# Patient Record
Sex: MALE | Race: WHITE | NOT HISPANIC OR LATINO | Employment: OTHER | URBAN - METROPOLITAN AREA
[De-identification: names, ages, dates, MRNs, and addresses within clinical notes are randomized per-mention and may not be internally consistent; named-entity substitution may affect disease eponyms.]

---

## 2020-09-02 ENCOUNTER — TRANSCRIBE ORDERS (OUTPATIENT)
Dept: ADMINISTRATIVE | Facility: HOSPITAL | Age: 65
End: 2020-09-02

## 2020-09-02 DIAGNOSIS — R94.39 ABNORMAL STRESS TEST: Primary | ICD-10-CM

## 2020-09-03 ENCOUNTER — TRANSCRIBE ORDERS (OUTPATIENT)
Dept: LAB | Facility: HOSPITAL | Age: 65
End: 2020-09-03

## 2020-09-03 DIAGNOSIS — Z01.818 PRE-OP TESTING: Primary | ICD-10-CM

## 2020-09-06 ENCOUNTER — LAB (OUTPATIENT)
Dept: LAB | Facility: HOSPITAL | Age: 65
End: 2020-09-06

## 2020-09-06 ENCOUNTER — TRANSCRIBE ORDERS (OUTPATIENT)
Dept: ADMINISTRATIVE | Facility: HOSPITAL | Age: 65
End: 2020-09-06

## 2020-09-06 DIAGNOSIS — Z01.818 PRE-OP TESTING: Primary | ICD-10-CM

## 2020-09-06 DIAGNOSIS — Z01.818 PRE-OP TESTING: ICD-10-CM

## 2020-09-06 PROCEDURE — C9803 HOPD COVID-19 SPEC COLLECT: HCPCS

## 2020-09-06 PROCEDURE — U0004 COV-19 TEST NON-CDC HGH THRU: HCPCS

## 2020-09-07 LAB — SARS-COV-2 RNA NOSE QL NAA+PROBE: NOT DETECTED

## 2020-09-09 ENCOUNTER — HOSPITAL ENCOUNTER (OUTPATIENT)
Facility: HOSPITAL | Age: 65
Setting detail: HOSPITAL OUTPATIENT SURGERY
Discharge: HOME | End: 2020-09-09
Attending: INTERNAL MEDICINE | Admitting: INTERNAL MEDICINE

## 2020-09-09 VITALS
HEIGHT: 70 IN | TEMPERATURE: 97 F | HEART RATE: 69 BPM | OXYGEN SATURATION: 95 % | DIASTOLIC BLOOD PRESSURE: 84 MMHG | SYSTOLIC BLOOD PRESSURE: 142 MMHG | RESPIRATION RATE: 18 BRPM | WEIGHT: 225.5 LBS | BODY MASS INDEX: 32.28 KG/M2

## 2020-09-09 DIAGNOSIS — R94.39 ABNORMAL STRESS TEST: Primary | ICD-10-CM

## 2020-09-09 LAB
ANION GAP SERPL CALCULATED.3IONS-SCNC: 8 MMOL/L (ref 5–15)
BUN SERPL-MCNC: 21 MG/DL (ref 8–23)
BUN/CREAT SERPL: 16.2 (ref 7–25)
CALCIUM SPEC-SCNC: 9.1 MG/DL (ref 8.6–10.5)
CHLORIDE SERPL-SCNC: 107 MMOL/L (ref 98–107)
CHOLEST SERPL-MCNC: 187 MG/DL (ref 0–200)
CO2 SERPL-SCNC: 23 MMOL/L (ref 22–29)
CREAT SERPL-MCNC: 1.3 MG/DL (ref 0.76–1.27)
DEPRECATED RDW RBC AUTO: 45.2 FL (ref 37–54)
ERYTHROCYTE [DISTWIDTH] IN BLOOD BY AUTOMATED COUNT: 12.5 % (ref 12.3–15.4)
GFR SERPL CREATININE-BSD FRML MDRD: 55 ML/MIN/1.73
GLUCOSE SERPL-MCNC: 126 MG/DL (ref 65–99)
HCT VFR BLD AUTO: 40.5 % (ref 37.5–51)
HDLC SERPL-MCNC: 28 MG/DL (ref 40–60)
HGB BLD-MCNC: 13.4 G/DL (ref 13–17.7)
LDLC SERPL CALC-MCNC: 109 MG/DL (ref 0–100)
LDLC/HDLC SERPL: 3.88 {RATIO}
MCH RBC QN AUTO: 32.5 PG (ref 26.6–33)
MCHC RBC AUTO-ENTMCNC: 33.1 G/DL (ref 31.5–35.7)
MCV RBC AUTO: 98.3 FL (ref 79–97)
PLATELET # BLD AUTO: 224 10*3/MM3 (ref 140–450)
PMV BLD AUTO: 8.8 FL (ref 6–12)
POTASSIUM SERPL-SCNC: 4.2 MMOL/L (ref 3.5–5.2)
RBC # BLD AUTO: 4.12 10*6/MM3 (ref 4.14–5.8)
SODIUM SERPL-SCNC: 138 MMOL/L (ref 136–145)
TRIGL SERPL-MCNC: 252 MG/DL (ref 0–150)
VLDLC SERPL-MCNC: 50.4 MG/DL
WBC # BLD AUTO: 7.1 10*3/MM3 (ref 3.4–10.8)

## 2020-09-09 PROCEDURE — 25010000003 LIDOCAINE 1 % SOLUTION: Performed by: INTERNAL MEDICINE

## 2020-09-09 PROCEDURE — C1769 GUIDE WIRE: HCPCS | Performed by: INTERNAL MEDICINE

## 2020-09-09 PROCEDURE — 25010000002 FENTANYL CITRATE (PF) 100 MCG/2ML SOLUTION: Performed by: INTERNAL MEDICINE

## 2020-09-09 PROCEDURE — 0 IOPAMIDOL PER 1 ML: Performed by: INTERNAL MEDICINE

## 2020-09-09 PROCEDURE — 80061 LIPID PANEL: CPT | Performed by: INTERNAL MEDICINE

## 2020-09-09 PROCEDURE — 82565 ASSAY OF CREATININE: CPT

## 2020-09-09 PROCEDURE — C1894 INTRO/SHEATH, NON-LASER: HCPCS | Performed by: INTERNAL MEDICINE

## 2020-09-09 PROCEDURE — 25010000002 MIDAZOLAM PER 1 MG: Performed by: INTERNAL MEDICINE

## 2020-09-09 PROCEDURE — 85027 COMPLETE CBC AUTOMATED: CPT

## 2020-09-09 PROCEDURE — 25010000002 HEPARIN (PORCINE) PER 1000 UNITS: Performed by: INTERNAL MEDICINE

## 2020-09-09 PROCEDURE — 93458 L HRT ARTERY/VENTRICLE ANGIO: CPT | Performed by: INTERNAL MEDICINE

## 2020-09-09 PROCEDURE — 80048 BASIC METABOLIC PNL TOTAL CA: CPT

## 2020-09-09 RX ORDER — ASPIRIN 81 MG/1
81 TABLET ORAL DAILY
COMMUNITY
End: 2021-07-01 | Stop reason: HOSPADM

## 2020-09-09 RX ORDER — ALPRAZOLAM 0.25 MG/1
0.25 TABLET ORAL 3 TIMES DAILY PRN
Status: DISCONTINUED | OUTPATIENT
Start: 2020-09-09 | End: 2020-09-09 | Stop reason: HOSPADM

## 2020-09-09 RX ORDER — NALOXONE HCL 0.4 MG/ML
0.4 VIAL (ML) INJECTION
Status: DISCONTINUED | OUTPATIENT
Start: 2020-09-09 | End: 2020-09-09 | Stop reason: HOSPADM

## 2020-09-09 RX ORDER — ASPIRIN 325 MG
325 TABLET, DELAYED RELEASE (ENTERIC COATED) ORAL DAILY
Status: DISCONTINUED | OUTPATIENT
Start: 2020-09-09 | End: 2020-09-09 | Stop reason: HOSPADM

## 2020-09-09 RX ORDER — MIDAZOLAM HYDROCHLORIDE 1 MG/ML
INJECTION INTRAMUSCULAR; INTRAVENOUS AS NEEDED
Status: DISCONTINUED | OUTPATIENT
Start: 2020-09-09 | End: 2020-09-09 | Stop reason: HOSPADM

## 2020-09-09 RX ORDER — TEMAZEPAM 7.5 MG/1
7.5 CAPSULE ORAL NIGHTLY PRN
Status: DISCONTINUED | OUTPATIENT
Start: 2020-09-09 | End: 2020-09-09 | Stop reason: HOSPADM

## 2020-09-09 RX ORDER — SODIUM CHLORIDE 9 MG/ML
250 INJECTION, SOLUTION INTRAVENOUS CONTINUOUS
Status: ACTIVE | OUTPATIENT
Start: 2020-09-09 | End: 2020-09-09

## 2020-09-09 RX ORDER — LIDOCAINE HYDROCHLORIDE 10 MG/ML
INJECTION, SOLUTION INFILTRATION; PERINEURAL AS NEEDED
Status: DISCONTINUED | OUTPATIENT
Start: 2020-09-09 | End: 2020-09-09 | Stop reason: HOSPADM

## 2020-09-09 RX ORDER — MORPHINE SULFATE 2 MG/ML
1 INJECTION, SOLUTION INTRAMUSCULAR; INTRAVENOUS EVERY 4 HOURS PRN
Status: DISCONTINUED | OUTPATIENT
Start: 2020-09-09 | End: 2020-09-09 | Stop reason: HOSPADM

## 2020-09-09 RX ORDER — ROSUVASTATIN CALCIUM 20 MG/1
20 TABLET, COATED ORAL DAILY
Qty: 30 TABLET | Refills: 11 | Status: SHIPPED | OUTPATIENT
Start: 2020-09-09 | End: 2020-09-10 | Stop reason: SDUPTHER

## 2020-09-09 RX ORDER — DEXTROSE MONOHYDRATE 25 G/50ML
25 INJECTION, SOLUTION INTRAVENOUS
Status: DISCONTINUED | OUTPATIENT
Start: 2020-09-09 | End: 2020-09-09 | Stop reason: HOSPADM

## 2020-09-09 RX ORDER — HYDROCODONE BITARTRATE AND ACETAMINOPHEN 5; 325 MG/1; MG/1
1 TABLET ORAL EVERY 4 HOURS PRN
Status: DISCONTINUED | OUTPATIENT
Start: 2020-09-09 | End: 2020-09-09 | Stop reason: HOSPADM

## 2020-09-09 RX ORDER — ACETAMINOPHEN 325 MG/1
650 TABLET ORAL EVERY 4 HOURS PRN
Status: DISCONTINUED | OUTPATIENT
Start: 2020-09-09 | End: 2020-09-09 | Stop reason: HOSPADM

## 2020-09-09 RX ORDER — FENTANYL CITRATE 50 UG/ML
INJECTION, SOLUTION INTRAMUSCULAR; INTRAVENOUS AS NEEDED
Status: DISCONTINUED | OUTPATIENT
Start: 2020-09-09 | End: 2020-09-09 | Stop reason: HOSPADM

## 2020-09-09 RX ORDER — NICOTINE POLACRILEX 4 MG
15 LOZENGE BUCCAL
Status: DISCONTINUED | OUTPATIENT
Start: 2020-09-09 | End: 2020-09-09 | Stop reason: HOSPADM

## 2020-09-09 RX ADMIN — ASPIRIN 325 MG: 325 TABLET, COATED ORAL at 07:19

## 2020-09-09 NOTE — H&P
Pre-Cardiac Catheterization Report  Cardiovascular Laboratory  Psychiatric      Patient:  Andry Potter  :  1955  PCP:  Jesse Moctezuma MD  PHONE:  [phone]    DATE: 2020    BRIEF HPI:  Andry Potter is a 65 y.o. male with hypertension, diabetes mellitus, and former tobacco abuse.  He is complaining of a 6-month history of increasing shortness of breath.  He states it is moderate in severity lasting hours with associated severe fatigue and having no energy.  He recently underwent an abnormal stress test and now presents for left heart catheterization with possible intervention.    Cardiac Risk Factors:  advanced age (older than 55 for men, 65 for women), diabetes mellitus, hypertension, male gender, obesity (BMI >= 30 kg/m2), smoking/ tobacco exposure    Anginal class in last 2 weeks:  CCS class I    CHF Class in last 2 weeks:  NYHA Class II    Cardiogenic shock:  no    Cardiac arrest <24 hours:  no    Stress test within last 6 months:   yes   Details:    Previous cardiac catheterization:  no  Details:     Previous CABG:  no  Details:      Allergies:     IV contrast allergy:  no  No Known Allergies    MEDICATIONS:  Prior to Admission medications    Medication Sig Start Date End Date Taking? Authorizing Provider   aspirin 81 MG EC tablet Take 81 mg by mouth Daily.   Yes Provider, MD Lucy   lisinopril (PRINIVIL,ZESTRIL) 10 MG tablet Take 10 mg by mouth Daily. 2/10/19  Yes Emergency, Nurse LAVERN García   metFORMIN (GLUCOPHAGE) 1000 MG tablet Take 1,000 mg by mouth 2 (Two) Times a Day With Meals. 2/10/19  Yes Emergency, Nurse Raquel RN   tamsulosin (FLOMAX) 0.4 MG capsule 24 hr capsule 1 capsule Daily. 19  Yes Emergency, Nurse Epic, RN       Past medical & surgical history, social and family history reviewed in the electronic medical record.    ROS:  Cardiovascular ROS: positive for - dyspnea on exertion, shortness of breath and fatigue    Physical Exam:    Vitals:   Vitals:     09/09/20 0644   BP: 159/96   Pulse: 69   Resp: 18   Temp: 97 °F (36.1 °C)   SpO2: 96%          09/09/20 0644   Weight: 102 kg (225 lb 8 oz)       General Appearance:    Alert, cooperative, in no acute distress   Head:    Normocephalic, without obvious abnormality, atraumatic   Eyes:            Lids and lashes normal, conjunctivae and sclerae normal, no   icterus, no pallor, corneas clear, PERRLA   Ears:    Ears appear intact with no abnormalities noted   Neck:   No adenopathy, supple, trachea midline, no thyromegaly, no   carotid bruit, no JVD   Back:     No kyphosis present, no scoliosis present, range of motion normal   Lungs:     Clear to auscultation,respirations regular, even and                unlabored    Heart:    Regular rhythm and normal rate, normal S1 and S2, no         murmur, no gallop, no rub, no click   Chest Wall:    No abnormalities observed   Abdomen:     Normal bowel sounds, no masses, no organomegaly, soft     nontender, nondistended, no guarding, no rebound                tenderness   Rectal:     Deferred   Extremities:   Moves all extremities well, no edema, no cyanosis, no           redness   Pulses:   Pulses palpable and equal bilaterally   Skin:   No bleeding, bruising or rash   Neurologic:   Cranial nerves 2 - 12 grossly intact, sensation intact     Barbaeu Test:  Left: Normal  (oxymetric Allens) Right: Not Assessed         Results from last 7 days   Lab Units 09/09/20  0637   WBC 10*3/mm3 7.10   HEMOGLOBIN g/dL 13.4   HEMATOCRIT % 40.5   PLATELETS 10*3/mm3 224     No results found for: CHLPL, TRIG, HDL, LDLDIRECT, AST, ALT        Impression      · Abnormal stress test    Plan     · Procedure to perform: [facility]  · Planned access: Per LAISHA Martinez  09/09/20  07:17

## 2020-09-10 ENCOUNTER — CONSULT (OUTPATIENT)
Dept: SLEEP MEDICINE | Facility: HOSPITAL | Age: 65
End: 2020-09-10

## 2020-09-10 VITALS
TEMPERATURE: 98.6 F | DIASTOLIC BLOOD PRESSURE: 62 MMHG | HEIGHT: 70 IN | WEIGHT: 226 LBS | HEART RATE: 74 BPM | OXYGEN SATURATION: 95 % | BODY MASS INDEX: 32.35 KG/M2 | SYSTOLIC BLOOD PRESSURE: 113 MMHG | RESPIRATION RATE: 16 BRPM

## 2020-09-10 DIAGNOSIS — E66.9 OBESITY (BMI 30-39.9): Primary | ICD-10-CM

## 2020-09-10 DIAGNOSIS — R06.83 SNORING: ICD-10-CM

## 2020-09-10 DIAGNOSIS — G47.33 OSA (OBSTRUCTIVE SLEEP APNEA): ICD-10-CM

## 2020-09-10 PROBLEM — E78.5 DYSLIPIDEMIA: Status: ACTIVE | Noted: 2020-09-10

## 2020-09-10 PROBLEM — E11.9 TYPE 2 DIABETES MELLITUS: Status: ACTIVE | Noted: 2020-09-10

## 2020-09-10 PROBLEM — I25.10 CAD (CORONARY ARTERY DISEASE): Status: ACTIVE | Noted: 2020-09-10

## 2020-09-10 PROBLEM — I10 ESSENTIAL HYPERTENSION: Status: ACTIVE | Noted: 2020-09-10

## 2020-09-10 PROCEDURE — 99204 OFFICE O/P NEW MOD 45 MIN: CPT | Performed by: INTERNAL MEDICINE

## 2020-09-10 RX ORDER — ROSUVASTATIN CALCIUM 20 MG/1
20 TABLET, COATED ORAL DAILY
Qty: 30 TABLET | Refills: 11 | Status: SHIPPED | OUTPATIENT
Start: 2020-09-10 | End: 2021-07-01 | Stop reason: HOSPADM

## 2020-09-10 NOTE — PROGRESS NOTES
Andry Potter is a 65 y.o. male.   Chief Complaint   Patient presents with   • Sleeping Problem       HPI     65 y.o. male seen for snoring and possible DASHAWN    He has been observed to have snoring and apneas by family and friends.  He recently underwent cardiac catheterization yesterday which revealed single-vessel disease with good collaterals and no intervention was performed.  He does have a medical history consistent with hypertension, type 2 diabetes mellitus, and dyslipidemia.    He denies any significant daytime somnolence.  He does awaken several times per night.  He averages about 6 and no more than 7 hours of sleep per night.    Further details are as follows:    Victor Scale is: 3/24    Estimated average amount of sleep per night: 6  Number of times he wakes up at night: 3  Difficulty falling back asleep: Sometimes  It usually takes 15 minutes to go to sleep.  He feels sleepy upon waking up: no  Rotating or night shift work: no    Drowsiness/Sleepiness:  He exhibits the following:  none    Snoring/Breathing:  He exhibits the following:  loud snoring  quits breathing at night    Reflux:  He describes the following:  none    Narcolepsy:  He exhibits the following:  none    RLS/PLMs:  He describes the following:  none    Insomnia:  He describes the following:  infrequent    Parasomnia:  He exhibits the following:  None    Weight:  Weight change in the last year:  gain: 0lbs      The patient's relevant past medical, surgical, family, and social history reviewed and updated in Epic as appropriate.    Current medications are:   Current Outpatient Medications:   •  aspirin 81 MG EC tablet, Take 81 mg by mouth Daily., Disp: , Rfl:   •  lisinopril (PRINIVIL,ZESTRIL) 10 MG tablet, Take 10 mg by mouth Daily., Disp: , Rfl:   •  metFORMIN (GLUCOPHAGE) 1000 MG tablet, Take 1,000 mg by mouth 2 (Two) Times a Day With Meals., Disp: , Rfl:   •  rosuvastatin (CRESTOR) 20 MG tablet, Take 1 tablet by mouth Daily., Disp: 30  "tablet, Rfl: 11  •  tamsulosin (FLOMAX) 0.4 MG capsule 24 hr capsule, 1 capsule Daily., Disp: , Rfl: .    Review of Systems    Review of Systems  ROS documented in patient questionnaire ×14 systems.  Reviewed with patient.  Otherwise negative except as noted in HPI.    Physical Exam    Blood pressure 113/62, pulse 74, temperature 98.6 °F (37 °C), resp. rate 16, height 177.8 cm (70\"), weight 103 kg (226 lb), SpO2 95 %. Body mass index is 32.43 kg/m².    Physical Exam   Constitutional: He is oriented to person, place, and time. He appears well-developed and well-nourished.   HENT:   Head: Normocephalic and atraumatic.   Nose: Nose normal.   Mouth/Throat: Oropharynx is clear and moist. No oropharyngeal exudate.   Class IV airway   Eyes: Conjunctivae are normal. No scleral icterus.   Neck: No tracheal deviation present. No thyromegaly present.   Cardiovascular: Normal rate and regular rhythm. Exam reveals no gallop and no friction rub.   No murmur heard.  Pulmonary/Chest: Effort normal. No respiratory distress. He has no wheezes. He has no rales.   Musculoskeletal: He exhibits no edema or deformity.   Neurological: He is alert and oriented to person, place, and time.   Skin: Skin is warm and dry. No rash noted.   Psychiatric: He has a normal mood and affect. His behavior is normal.   Nursing note and vitals reviewed.      ASSESSMENT:    Problem List Items Addressed This Visit        Pulmonary Problems    Snoring    Relevant Orders    Home Sleep Study    DASHAWN (obstructive sleep apnea) - possible    Relevant Orders    Home Sleep Study       Other    Obesity (BMI 30-39.9) - Primary          65-year-old male with evidence of obstructive sleep apnea based upon snoring and witnessed apneas primarily.  He also has hypertension, diabetes mellitus, and dyslipidemia as comorbidities.  He is at increased risk for sleep apnea based upon age and BMI.    PLAN:    1. I have discussed the possible diagnosis of obstructive sleep apnea as " well as its possible relationship to other conditions that he has such as cardiovascular disease and metabolic conditions.  2. We discussed the diagnostic process as well as treatment options for obstructive sleep apnea in detail  3. He seemed amenable to a trial of CPAP therapy if deemed appropriate based upon his HSAT results  4. He would be appropriate for an HSAT as the primary diagnostic modality  5. Close sleep center follow-up    I have reviewed the results of my evaluation and impression and discussed my recommendations in detail with the patient.    Level of Risk Moderate due to: undiagnosed new problem    Signed by  Mike Warner MD    September 10, 2020      CC: Jesse Moctezuma MD          No ref. provider found

## 2020-09-15 ENCOUNTER — DOCUMENTATION (OUTPATIENT)
Dept: CARDIAC REHAB | Facility: HOSPITAL | Age: 65
End: 2020-09-15

## 2020-09-16 LAB — CREAT BLDA-MCNC: 1.3 MG/DL (ref 0.6–1.3)

## 2020-10-07 ENCOUNTER — HOSPITAL ENCOUNTER (OUTPATIENT)
Dept: SLEEP MEDICINE | Facility: HOSPITAL | Age: 65
Discharge: HOME | End: 2020-10-07
Admitting: INTERNAL MEDICINE

## 2020-10-07 VITALS — WEIGHT: 256 LBS | HEIGHT: 70 IN | BODY MASS INDEX: 36.65 KG/M2

## 2020-10-07 DIAGNOSIS — G47.33 OSA (OBSTRUCTIVE SLEEP APNEA): ICD-10-CM

## 2020-10-07 DIAGNOSIS — R06.83 SNORING: ICD-10-CM

## 2020-10-07 PROCEDURE — 95800 SLP STDY UNATTENDED: CPT

## 2020-10-07 PROCEDURE — 95800 SLP STDY UNATTENDED: CPT | Performed by: INTERNAL MEDICINE

## 2020-10-14 DIAGNOSIS — R06.83 SNORING: ICD-10-CM

## 2020-10-14 DIAGNOSIS — G47.33 MODERATE OBSTRUCTIVE SLEEP APNEA: Primary | ICD-10-CM

## 2020-10-14 NOTE — PROGRESS NOTES
CALLED PATIENT TO ADVISE OF STUDY RESULTS. PATIENT VERBALIZED UNDERSTANDING BUT DECLINE PAP THERAPY. SCHEDULED FU WITH PROVIDER TO DISCUSS 10/14/20 TRC

## 2020-11-05 ENCOUNTER — OFFICE VISIT (OUTPATIENT)
Dept: SLEEP MEDICINE | Facility: HOSPITAL | Age: 65
End: 2020-11-05

## 2020-11-05 VITALS
BODY MASS INDEX: 32.44 KG/M2 | DIASTOLIC BLOOD PRESSURE: 60 MMHG | HEIGHT: 70 IN | HEART RATE: 67 BPM | SYSTOLIC BLOOD PRESSURE: 137 MMHG | OXYGEN SATURATION: 98 % | WEIGHT: 226.6 LBS

## 2020-11-05 DIAGNOSIS — G47.33 MODERATE OBSTRUCTIVE SLEEP APNEA: Primary | ICD-10-CM

## 2020-11-05 DIAGNOSIS — R06.83 SNORING: ICD-10-CM

## 2020-11-05 DIAGNOSIS — E66.9 OBESITY (BMI 30-39.9): ICD-10-CM

## 2020-11-05 PROCEDURE — 99214 OFFICE O/P EST MOD 30 MIN: CPT | Performed by: INTERNAL MEDICINE

## 2020-11-05 NOTE — PROGRESS NOTES
Subjective:     Chief Complaint:   Chief Complaint   Patient presents with   • Follow-up       HPI:    Andry Potter is a 65 y.o. male here for follow-up of obstructive sleep apnea    He has been observed to have snoring and apneas by family and friends.  He recently underwent cardiac catheterization in September 2020 which revealed single-vessel disease with good collaterals and no intervention was performed.  He does have a medical history consistent with hypertension, type 2 diabetes mellitus, and dyslipidemia.     He was referred to the sleep center in September.  He denied any significant daytime somnolence.  He does awaken several times per night.  He averages about 6 and no more than 7 hours of sleep per night.    He underwent a home sleep apnea test which revealed a moderate degree of obstructive sleep apnea with an AHI of 16.  He wanted to discuss treatment options and is here for a follow-up visit.    Current medications are:   Current Outpatient Medications:   •  aspirin 81 MG EC tablet, Take 81 mg by mouth Daily., Disp: , Rfl:   •  lisinopril (PRINIVIL,ZESTRIL) 10 MG tablet, Take 10 mg by mouth Daily., Disp: , Rfl:   •  metFORMIN (GLUCOPHAGE) 1000 MG tablet, Take 1,000 mg by mouth 2 (Two) Times a Day With Meals., Disp: , Rfl:   •  rosuvastatin (CRESTOR) 20 MG tablet, Take 1 tablet by mouth Daily., Disp: 30 tablet, Rfl: 11  •  tamsulosin (FLOMAX) 0.4 MG capsule 24 hr capsule, 1 capsule Daily., Disp: , Rfl: .      The patient's relevant past medical, surgical, family and social history were reviewed and updated in Epic as appropriate.     ROS:    Review of Systems   Constitutional: Negative for fatigue.   Respiratory: Positive for apnea.    Psychiatric/Behavioral: Positive for sleep disturbance.         Objective:    Physical Exam  Vitals signs reviewed.   Constitutional:       Appearance: He is well-developed.   HENT:      Head: Normocephalic and atraumatic.      Mouth/Throat:      Mouth: Mucous membranes  are moist.      Pharynx: Oropharynx is clear.      Comments: Class IV airway  Neck:      Musculoskeletal: Neck supple.      Thyroid: No thyromegaly.   Cardiovascular:      Rate and Rhythm: Normal rate and regular rhythm.      Heart sounds: No murmur. No friction rub. No gallop.    Pulmonary:      Effort: Pulmonary effort is normal. No respiratory distress.      Breath sounds: No wheezing or rales.   Skin:     General: Skin is warm and dry.   Neurological:      Mental Status: He is alert and oriented to person, place, and time.   Psychiatric:         Behavior: Behavior normal.         Thought Content: Thought content normal.           Assessment:    Problem List Items Addressed This Visit        Pulmonary Problems    Snoring    Moderate obstructive sleep apnea - Primary       Other    Obesity (BMI 30-39.9)          1. Moderate obstructive sleep apnea: I went over the sleep study with him and answered all questions.  I discussed the importance of treating obstructive sleep apnea and the potential benefits with his day to day quality of life and avoidance of long-term cardiovascular and metabolic complications.  I suggested a trial of CPAP therapy.  2. Obesity: BMI 32.5.  He would benefit from weight loss and we discussed this.  3. Snoring: Related to #1 should be treated with CPAP therapy  4. CAD: Status post stent placement.  Has follow-up with Dr. Walker    Plan:     1. Auto CPAP therapy  2. Mask fitting per DME company  3. Close sleep center follow-up to assess efficacy and compliance with therapy    Discussed in detail with the patient.  He will call prior to his follow up visit for any new problems.    Level of Risk Moderate due to: mild exacerbation of one chronic illness    Signed by  Mike Warner MD

## 2021-01-29 ENCOUNTER — OFFICE VISIT (OUTPATIENT)
Dept: SLEEP MEDICINE | Facility: HOSPITAL | Age: 66
End: 2021-01-29

## 2021-01-29 VITALS
HEART RATE: 88 BPM | SYSTOLIC BLOOD PRESSURE: 146 MMHG | WEIGHT: 236.2 LBS | DIASTOLIC BLOOD PRESSURE: 72 MMHG | BODY MASS INDEX: 33.82 KG/M2 | OXYGEN SATURATION: 97 % | HEIGHT: 70 IN

## 2021-01-29 DIAGNOSIS — G47.33 OSA (OBSTRUCTIVE SLEEP APNEA): Primary | ICD-10-CM

## 2021-01-29 PROCEDURE — 99212 OFFICE O/P EST SF 10 MIN: CPT | Performed by: NURSE PRACTITIONER

## 2021-01-29 NOTE — PROGRESS NOTES
Chief Complaint:   Chief Complaint   Patient presents with   • Follow-up       HPI:    Andry Potter is a 66 y.o. male here for follow-up of sleep apnea.  Patient was last seen 11/5/2020 and initiated CPAP therapy for an AHI of 16.  Patient states i is having a hard time getting used to CPAP but is trying.  Patient states he is sleeping 5 hours nightly and does not feel any differently than before using and denies complaints of excessive daytime sleepiness before starting.  Patient has an [place] score of 8/24.  Patient has no concerns and does wish to be compliant he will continue with CPAP.        Current medications are:   Current Outpatient Medications:   •  aspirin 81 MG EC tablet, Take 81 mg by mouth Daily., Disp: , Rfl:   •  lisinopril (PRINIVIL,ZESTRIL) 10 MG tablet, Take 10 mg by mouth Daily., Disp: , Rfl:   •  metFORMIN (GLUCOPHAGE) 1000 MG tablet, Take 1,000 mg by mouth 2 (Two) Times a Day With Meals., Disp: , Rfl:   •  rosuvastatin (CRESTOR) 20 MG tablet, Take 1 tablet by mouth Daily., Disp: 30 tablet, Rfl: 11  •  tamsulosin (FLOMAX) 0.4 MG capsule 24 hr capsule, 1 capsule Daily., Disp: , Rfl: .      The patient's relevant past medical, surgical, family and social history were reviewed and updated in Epic as appropriate.       Review of Systems   Respiratory: Positive for apnea.    Genitourinary: Positive for frequency.   Psychiatric/Behavioral: Positive for sleep disturbance.   All other systems reviewed and are negative.        Objective:    Physical Exam  Constitutional:       Appearance: Normal appearance.   HENT:      Head: Normocephalic and atraumatic.      Mouth/Throat:      Mouth: Mucous membranes are moist.      Pharynx: Oropharynx is clear.      Comments: Class 4 airway  Eyes:      Conjunctiva/sclera: Conjunctivae normal.      Pupils: Pupils are equal, round, and reactive to light.   Neck:      Musculoskeletal: Neck supple.   Cardiovascular:      Rate and Rhythm: Normal rate and regular  rhythm.      Pulses: Normal pulses.      Heart sounds: Normal heart sounds.   Pulmonary:      Effort: Pulmonary effort is normal. No respiratory distress.      Breath sounds: Normal breath sounds.   Skin:     General: Skin is dry.      Coloration: Skin is not pale.      Findings: No erythema.   Neurological:      Mental Status: He is alert and oriented to person, place, and time.   Psychiatric:         Mood and Affect: Mood normal.         Behavior: Behavior normal.         Thought Content: Thought content normal.         Judgment: Judgment normal.     21/30 days of use  Greater than 4-hour use 43.3  90% pressure 8.9  AHI 1.7  Settings 8-20      ASSESSMENT/PLAN    Diagnoses and all orders for this visit:    1. DASHAWN (obstructive sleep apnea) (Primary)  -     CPAP Therapy            1. Counseled patient regarding multimodal approach with healthy nutrition, healthy sleep, regular physical activity, social activities, counseling, and medications. Encouraged to practice lateral   sleep position. Avoid alcohol and sedatives close to bedtime.  2. Refill supplies x1 year.  Return to clinic 1 year or sooner symptoms warrant.  Patient encouraged increased use.    I have reviewed the results of my evaluation and impression and discussed my recommendations in detail with the patient.      Signed by  LIZBETH Roth    January 29, 2021      CC: Jesse Moctezuma MD          No ref. provider found

## 2021-02-15 ENCOUNTER — IMMUNIZATION (OUTPATIENT)
Dept: VACCINE CLINIC | Facility: HOSPITAL | Age: 66
End: 2021-02-15

## 2021-02-15 PROCEDURE — 91300 HC SARSCOV02 VAC 30MCG/0.3ML IM: CPT | Performed by: PHYSICIAN ASSISTANT

## 2021-02-15 PROCEDURE — 0001A: CPT | Performed by: PHYSICIAN ASSISTANT

## 2021-03-08 ENCOUNTER — IMMUNIZATION (OUTPATIENT)
Dept: VACCINE CLINIC | Facility: HOSPITAL | Age: 66
End: 2021-03-08

## 2021-03-08 PROCEDURE — 0002A: CPT | Performed by: INTERNAL MEDICINE

## 2021-03-08 PROCEDURE — 91300 HC SARSCOV02 VAC 30MCG/0.3ML IM: CPT | Performed by: INTERNAL MEDICINE

## 2021-06-29 ENCOUNTER — HOSPITAL ENCOUNTER (INPATIENT)
Facility: HOSPITAL | Age: 66
LOS: 2 days | Discharge: HOME | End: 2021-07-01
Attending: EMERGENCY MEDICINE | Admitting: INTERNAL MEDICINE

## 2021-06-29 ENCOUNTER — APPOINTMENT (OUTPATIENT)
Dept: CT IMAGING | Facility: HOSPITAL | Age: 66
End: 2021-06-29

## 2021-06-29 ENCOUNTER — APPOINTMENT (OUTPATIENT)
Dept: GENERAL RADIOLOGY | Facility: HOSPITAL | Age: 66
End: 2021-06-29

## 2021-06-29 DIAGNOSIS — I10 ELEVATED BLOOD PRESSURE READING WITH DIAGNOSIS OF HYPERTENSION: ICD-10-CM

## 2021-06-29 DIAGNOSIS — R53.1 ACUTE LEFT-SIDED WEAKNESS: ICD-10-CM

## 2021-06-29 DIAGNOSIS — I63.511 ACUTE ISCHEMIC RIGHT MIDDLE CEREBRAL ARTERY (MCA) STROKE (HCC): Primary | ICD-10-CM

## 2021-06-29 PROBLEM — N18.2 CKD (CHRONIC KIDNEY DISEASE) STAGE 2, GFR 60-89 ML/MIN: Status: ACTIVE | Noted: 2021-06-29

## 2021-06-29 LAB
ALBUMIN SERPL-MCNC: 4 G/DL (ref 3.5–5.2)
ALBUMIN/GLOB SERPL: 1.7 G/DL
ALP SERPL-CCNC: 53 U/L (ref 39–117)
ALT SERPL W P-5'-P-CCNC: 18 U/L (ref 1–41)
ANION GAP SERPL CALCULATED.3IONS-SCNC: 11 MMOL/L (ref 5–15)
AST SERPL-CCNC: 15 U/L (ref 1–40)
BASOPHILS # BLD AUTO: 0.05 10*3/MM3 (ref 0–0.2)
BASOPHILS NFR BLD AUTO: 0.7 % (ref 0–1.5)
BILIRUB SERPL-MCNC: 0.3 MG/DL (ref 0–1.2)
BUN SERPL-MCNC: 20 MG/DL (ref 8–23)
BUN/CREAT SERPL: 15.3 (ref 7–25)
CALCIUM SPEC-SCNC: 9.3 MG/DL (ref 8.6–10.5)
CHLORIDE SERPL-SCNC: 111 MMOL/L (ref 98–107)
CO2 SERPL-SCNC: 21 MMOL/L (ref 22–29)
CREAT SERPL-MCNC: 1.31 MG/DL (ref 0.76–1.27)
DEPRECATED RDW RBC AUTO: 43.9 FL (ref 37–54)
EOSINOPHIL # BLD AUTO: 0.17 10*3/MM3 (ref 0–0.4)
EOSINOPHIL NFR BLD AUTO: 2.4 % (ref 0.3–6.2)
ERYTHROCYTE [DISTWIDTH] IN BLOOD BY AUTOMATED COUNT: 12.6 % (ref 12.3–15.4)
GFR SERPL CREATININE-BSD FRML MDRD: 54 ML/MIN/1.73
GLOBULIN UR ELPH-MCNC: 2.4 GM/DL
GLUCOSE BLDC GLUCOMTR-MCNC: 103 MG/DL (ref 70–130)
GLUCOSE BLDC GLUCOMTR-MCNC: 111 MG/DL (ref 70–130)
GLUCOSE SERPL-MCNC: 152 MG/DL (ref 65–99)
HCT VFR BLD AUTO: 36.7 % (ref 37.5–51)
HGB BLD-MCNC: 12.2 G/DL (ref 13–17.7)
HOLD SPECIMEN: NORMAL
IMM GRANULOCYTES # BLD AUTO: 0.02 10*3/MM3 (ref 0–0.05)
IMM GRANULOCYTES NFR BLD AUTO: 0.3 % (ref 0–0.5)
INR PPP: 1 (ref 0.8–1.2)
LYMPHOCYTES # BLD AUTO: 2.14 10*3/MM3 (ref 0.7–3.1)
LYMPHOCYTES NFR BLD AUTO: 29.7 % (ref 19.6–45.3)
MAGNESIUM SERPL-MCNC: 1.9 MG/DL (ref 1.6–2.4)
MCH RBC QN AUTO: 32 PG (ref 26.6–33)
MCHC RBC AUTO-ENTMCNC: 33.2 G/DL (ref 31.5–35.7)
MCV RBC AUTO: 96.3 FL (ref 79–97)
MONOCYTES # BLD AUTO: 0.63 10*3/MM3 (ref 0.1–0.9)
MONOCYTES NFR BLD AUTO: 8.7 % (ref 5–12)
NEUTROPHILS NFR BLD AUTO: 4.2 10*3/MM3 (ref 1.7–7)
NEUTROPHILS NFR BLD AUTO: 58.2 % (ref 42.7–76)
NRBC BLD AUTO-RTO: 0 /100 WBC (ref 0–0.2)
PLATELET # BLD AUTO: 188 10*3/MM3 (ref 140–450)
PMV BLD AUTO: 8.9 FL (ref 6–12)
POTASSIUM SERPL-SCNC: 4.1 MMOL/L (ref 3.5–5.2)
PROT SERPL-MCNC: 6.4 G/DL (ref 6–8.5)
PROTHROMBIN TIME: 12.5 SECONDS (ref 12.8–15.2)
QT INTERVAL: 360 MS
QTC INTERVAL: 423 MS
RBC # BLD AUTO: 3.81 10*6/MM3 (ref 4.14–5.8)
SARS-COV-2 RDRP RESP QL NAA+PROBE: NORMAL
SODIUM SERPL-SCNC: 143 MMOL/L (ref 136–145)
TROPONIN T SERPL-MCNC: <0.01 NG/ML (ref 0–0.03)
WBC # BLD AUTO: 7.21 10*3/MM3 (ref 3.4–10.8)
WHOLE BLOOD HOLD SPECIMEN: NORMAL

## 2021-06-29 PROCEDURE — C1894 INTRO/SHEATH, NON-LASER: HCPCS | Performed by: RADIOLOGY

## 2021-06-29 PROCEDURE — 82565 ASSAY OF CREATININE: CPT

## 2021-06-29 PROCEDURE — 83735 ASSAY OF MAGNESIUM: CPT | Performed by: EMERGENCY MEDICINE

## 2021-06-29 PROCEDURE — 85014 HEMATOCRIT: CPT

## 2021-06-29 PROCEDURE — 84132 ASSAY OF SERUM POTASSIUM: CPT

## 2021-06-29 PROCEDURE — B3161ZZ FLUOROSCOPY OF RIGHT INTERNAL CAROTID ARTERY USING LOW OSMOLAR CONTRAST: ICD-10-PCS | Performed by: RADIOLOGY

## 2021-06-29 PROCEDURE — C1769 GUIDE WIRE: HCPCS | Performed by: RADIOLOGY

## 2021-06-29 PROCEDURE — 85025 COMPLETE CBC W/AUTO DIFF WBC: CPT | Performed by: EMERGENCY MEDICINE

## 2021-06-29 PROCEDURE — 3E03317 INTRODUCTION OF OTHER THROMBOLYTIC INTO PERIPHERAL VEIN, PERCUTANEOUS APPROACH: ICD-10-PCS | Performed by: EMERGENCY MEDICINE

## 2021-06-29 PROCEDURE — 70498 CT ANGIOGRAPHY NECK: CPT

## 2021-06-29 PROCEDURE — 0042T HC CT CEREBRAL PERFUSION W/WO CONTRAST: CPT

## 2021-06-29 PROCEDURE — 99285 EMERGENCY DEPT VISIT HI MDM: CPT

## 2021-06-29 PROCEDURE — 82947 ASSAY GLUCOSE BLOOD QUANT: CPT

## 2021-06-29 PROCEDURE — 61645 PERQ ART M-THROMBECT &/NFS: CPT | Performed by: RADIOLOGY

## 2021-06-29 PROCEDURE — 84295 ASSAY OF SERUM SODIUM: CPT

## 2021-06-29 PROCEDURE — C1760 CLOSURE DEV, VASC: HCPCS | Performed by: RADIOLOGY

## 2021-06-29 PROCEDURE — 0 IOPAMIDOL PER 1 ML: Performed by: EMERGENCY MEDICINE

## 2021-06-29 PROCEDURE — 84484 ASSAY OF TROPONIN QUANT: CPT | Performed by: EMERGENCY MEDICINE

## 2021-06-29 PROCEDURE — 92523 SPEECH SOUND LANG COMPREHEN: CPT | Performed by: SPEECH-LANGUAGE PATHOLOGIST

## 2021-06-29 PROCEDURE — 82962 GLUCOSE BLOOD TEST: CPT

## 2021-06-29 PROCEDURE — 87635 SARS-COV-2 COVID-19 AMP PRB: CPT | Performed by: EMERGENCY MEDICINE

## 2021-06-29 PROCEDURE — 85610 PROTHROMBIN TIME: CPT

## 2021-06-29 PROCEDURE — 0 IODIXANOL PER 1 ML: Performed by: RADIOLOGY

## 2021-06-29 PROCEDURE — 70496 CT ANGIOGRAPHY HEAD: CPT

## 2021-06-29 PROCEDURE — 99291 CRITICAL CARE FIRST HOUR: CPT | Performed by: PSYCHIATRY & NEUROLOGY

## 2021-06-29 PROCEDURE — 92610 EVALUATE SWALLOWING FUNCTION: CPT | Performed by: SPEECH-LANGUAGE PATHOLOGIST

## 2021-06-29 PROCEDURE — 03CG3Z7 EXTIRPATION OF MATTER FROM INTRACRANIAL ARTERY USING STENT RETRIEVER, PERCUTANEOUS APPROACH: ICD-10-PCS | Performed by: RADIOLOGY

## 2021-06-29 PROCEDURE — 71045 X-RAY EXAM CHEST 1 VIEW: CPT

## 2021-06-29 PROCEDURE — 80053 COMPREHEN METABOLIC PANEL: CPT | Performed by: EMERGENCY MEDICINE

## 2021-06-29 PROCEDURE — 93005 ELECTROCARDIOGRAM TRACING: CPT | Performed by: EMERGENCY MEDICINE

## 2021-06-29 PROCEDURE — 82803 BLOOD GASES ANY COMBINATION: CPT

## 2021-06-29 PROCEDURE — 82330 ASSAY OF CALCIUM: CPT

## 2021-06-29 PROCEDURE — C2628 CATHETER, OCCLUSION: HCPCS | Performed by: RADIOLOGY

## 2021-06-29 PROCEDURE — 70450 CT HEAD/BRAIN W/O DYE: CPT

## 2021-06-29 PROCEDURE — 93005 ELECTROCARDIOGRAM TRACING: CPT

## 2021-06-29 PROCEDURE — 99223 1ST HOSP IP/OBS HIGH 75: CPT | Performed by: INTERNAL MEDICINE

## 2021-06-29 PROCEDURE — 25010000002 ALTEPLASE PER 1 MG: Performed by: EMERGENCY MEDICINE

## 2021-06-29 RX ORDER — ATORVASTATIN CALCIUM 40 MG/1
80 TABLET, FILM COATED ORAL NIGHTLY
Status: DISCONTINUED | OUTPATIENT
Start: 2021-06-29 | End: 2021-07-01 | Stop reason: HOSPADM

## 2021-06-29 RX ORDER — SODIUM CHLORIDE 0.9 % (FLUSH) 0.9 %
10 SYRINGE (ML) INJECTION AS NEEDED
Status: DISCONTINUED | OUTPATIENT
Start: 2021-06-29 | End: 2021-07-01 | Stop reason: HOSPADM

## 2021-06-29 RX ORDER — IODIXANOL 320 MG/ML
INJECTION, SOLUTION INTRAVASCULAR AS NEEDED
Status: DISCONTINUED | OUTPATIENT
Start: 2021-06-29 | End: 2021-06-29 | Stop reason: HOSPADM

## 2021-06-29 RX ORDER — LIDOCAINE HYDROCHLORIDE 10 MG/ML
INJECTION, SOLUTION EPIDURAL; INFILTRATION; INTRACAUDAL; PERINEURAL AS NEEDED
Status: DISCONTINUED | OUTPATIENT
Start: 2021-06-29 | End: 2021-06-29 | Stop reason: HOSPADM

## 2021-06-29 RX ORDER — ASPIRIN 300 MG/1
300 SUPPOSITORY RECTAL DAILY
Status: DISCONTINUED | OUTPATIENT
Start: 2021-06-30 | End: 2021-07-01

## 2021-06-29 RX ORDER — SODIUM CHLORIDE 0.9 % (FLUSH) 0.9 %
10 SYRINGE (ML) INJECTION EVERY 12 HOURS SCHEDULED
Status: DISCONTINUED | OUTPATIENT
Start: 2021-06-29 | End: 2021-07-01 | Stop reason: HOSPADM

## 2021-06-29 RX ORDER — ASPIRIN 325 MG
325 TABLET ORAL DAILY
Status: DISCONTINUED | OUTPATIENT
Start: 2021-06-30 | End: 2021-07-01

## 2021-06-29 RX ORDER — SODIUM CHLORIDE 9 MG/ML
100 INJECTION, SOLUTION INTRAVENOUS ONCE
Status: COMPLETED | OUTPATIENT
Start: 2021-06-29 | End: 2021-06-29

## 2021-06-29 RX ADMIN — ALTEPLASE 81 MG: KIT at 11:31

## 2021-06-29 RX ADMIN — ATORVASTATIN CALCIUM 80 MG: 40 TABLET, FILM COATED ORAL at 20:39

## 2021-06-29 RX ADMIN — IOPAMIDOL 100 ML: 755 INJECTION, SOLUTION INTRAVENOUS at 11:34

## 2021-06-29 RX ADMIN — SODIUM CHLORIDE 100 ML: 9 INJECTION, SOLUTION INTRAVENOUS at 12:15

## 2021-06-29 RX ADMIN — SODIUM CHLORIDE, PRESERVATIVE FREE 10 ML: 5 INJECTION INTRAVENOUS at 20:39

## 2021-06-29 RX ADMIN — WATER 9 MG: 1 INJECTION INTRAMUSCULAR; INTRAVENOUS; SUBCUTANEOUS at 11:30

## 2021-06-29 RX ADMIN — NICARDIPINE HYDROCHLORIDE 5 MG/HR: 0.1 INJECTION, SOLUTION INTRAVENOUS at 17:26

## 2021-06-29 RX ADMIN — NICARDIPINE HYDROCHLORIDE 5 MG/HR: 0.1 INJECTION, SOLUTION INTRAVENOUS at 14:37

## 2021-06-30 ENCOUNTER — APPOINTMENT (OUTPATIENT)
Dept: MRI IMAGING | Facility: HOSPITAL | Age: 66
End: 2021-06-30

## 2021-06-30 ENCOUNTER — APPOINTMENT (OUTPATIENT)
Dept: CT IMAGING | Facility: HOSPITAL | Age: 66
End: 2021-06-30

## 2021-06-30 PROBLEM — I63.511 ACUTE ISCHEMIC RIGHT MIDDLE CEREBRAL ARTERY (MCA) STROKE (HCC): Status: ACTIVE | Noted: 2021-06-30

## 2021-06-30 LAB
ANION GAP SERPL CALCULATED.3IONS-SCNC: 9 MMOL/L (ref 5–15)
BACTERIA UR QL AUTO: NORMAL /HPF
BASOPHILS # BLD AUTO: 0.05 10*3/MM3 (ref 0–0.2)
BASOPHILS NFR BLD AUTO: 0.5 % (ref 0–1.5)
BILIRUB UR QL STRIP: NEGATIVE
BUN SERPL-MCNC: 16 MG/DL (ref 8–23)
BUN/CREAT SERPL: 13.7 (ref 7–25)
CALCIUM SPEC-SCNC: 8.7 MG/DL (ref 8.6–10.5)
CHLORIDE SERPL-SCNC: 109 MMOL/L (ref 98–107)
CHOLEST SERPL-MCNC: 126 MG/DL (ref 0–200)
CLARITY UR: CLEAR
CO2 SERPL-SCNC: 22 MMOL/L (ref 22–29)
COLOR UR: YELLOW
CREAT SERPL-MCNC: 1.17 MG/DL (ref 0.76–1.27)
DEPRECATED RDW RBC AUTO: 44 FL (ref 37–54)
EOSINOPHIL # BLD AUTO: 0.31 10*3/MM3 (ref 0–0.4)
EOSINOPHIL NFR BLD AUTO: 3.3 % (ref 0.3–6.2)
ERYTHROCYTE [DISTWIDTH] IN BLOOD BY AUTOMATED COUNT: 12.7 % (ref 12.3–15.4)
GFR SERPL CREATININE-BSD FRML MDRD: 62 ML/MIN/1.73
GLUCOSE BLDC GLUCOMTR-MCNC: 123 MG/DL (ref 70–130)
GLUCOSE BLDC GLUCOMTR-MCNC: 148 MG/DL (ref 70–130)
GLUCOSE BLDC GLUCOMTR-MCNC: 158 MG/DL (ref 70–130)
GLUCOSE SERPL-MCNC: 98 MG/DL (ref 65–99)
GLUCOSE UR STRIP-MCNC: NEGATIVE MG/DL
HBA1C MFR BLD: 6.2 % (ref 4.8–5.6)
HCT VFR BLD AUTO: 34 % (ref 37.5–51)
HDLC SERPL-MCNC: 28 MG/DL (ref 40–60)
HGB BLD-MCNC: 11.4 G/DL (ref 13–17.7)
HGB UR QL STRIP.AUTO: NEGATIVE
HYALINE CASTS UR QL AUTO: NORMAL /LPF
IMM GRANULOCYTES # BLD AUTO: 0.03 10*3/MM3 (ref 0–0.05)
IMM GRANULOCYTES NFR BLD AUTO: 0.3 % (ref 0–0.5)
KETONES UR QL STRIP: NEGATIVE
LDLC SERPL CALC-MCNC: 63 MG/DL (ref 0–100)
LDLC/HDLC SERPL: 2 {RATIO}
LEUKOCYTE ESTERASE UR QL STRIP.AUTO: ABNORMAL
LYMPHOCYTES # BLD AUTO: 2.83 10*3/MM3 (ref 0.7–3.1)
LYMPHOCYTES NFR BLD AUTO: 30.1 % (ref 19.6–45.3)
MAGNESIUM SERPL-MCNC: 1.8 MG/DL (ref 1.6–2.4)
MCH RBC QN AUTO: 32.4 PG (ref 26.6–33)
MCHC RBC AUTO-ENTMCNC: 33.5 G/DL (ref 31.5–35.7)
MCV RBC AUTO: 96.6 FL (ref 79–97)
MONOCYTES # BLD AUTO: 0.82 10*3/MM3 (ref 0.1–0.9)
MONOCYTES NFR BLD AUTO: 8.7 % (ref 5–12)
NEUTROPHILS NFR BLD AUTO: 5.36 10*3/MM3 (ref 1.7–7)
NEUTROPHILS NFR BLD AUTO: 57.1 % (ref 42.7–76)
NITRITE UR QL STRIP: NEGATIVE
NRBC BLD AUTO-RTO: 0 /100 WBC (ref 0–0.2)
PH UR STRIP.AUTO: 5.5 [PH] (ref 5–8)
PLATELET # BLD AUTO: 178 10*3/MM3 (ref 140–450)
PMV BLD AUTO: 9.1 FL (ref 6–12)
POTASSIUM SERPL-SCNC: 4.1 MMOL/L (ref 3.5–5.2)
PROT UR QL STRIP: NEGATIVE
RBC # BLD AUTO: 3.52 10*6/MM3 (ref 4.14–5.8)
RBC # UR: NORMAL /HPF
REF LAB TEST METHOD: NORMAL
SODIUM SERPL-SCNC: 140 MMOL/L (ref 136–145)
SP GR UR STRIP: 1.01 (ref 1–1.03)
SQUAMOUS #/AREA URNS HPF: NORMAL /HPF
TRIGL SERPL-MCNC: 210 MG/DL (ref 0–150)
UROBILINOGEN UR QL STRIP: ABNORMAL
VLDLC SERPL-MCNC: 35 MG/DL (ref 5–40)
WBC # BLD AUTO: 9.4 10*3/MM3 (ref 3.4–10.8)
WBC UR QL AUTO: NORMAL /HPF

## 2021-06-30 PROCEDURE — 92523 SPEECH SOUND LANG COMPREHEN: CPT | Performed by: SPEECH-LANGUAGE PATHOLOGIST

## 2021-06-30 PROCEDURE — 81001 URINALYSIS AUTO W/SCOPE: CPT | Performed by: EMERGENCY MEDICINE

## 2021-06-30 PROCEDURE — 97165 OT EVAL LOW COMPLEX 30 MIN: CPT

## 2021-06-30 PROCEDURE — 83036 HEMOGLOBIN GLYCOSYLATED A1C: CPT | Performed by: NURSE PRACTITIONER

## 2021-06-30 PROCEDURE — 85025 COMPLETE CBC W/AUTO DIFF WBC: CPT | Performed by: INTERNAL MEDICINE

## 2021-06-30 PROCEDURE — 92526 ORAL FUNCTION THERAPY: CPT | Performed by: SPEECH-LANGUAGE PATHOLOGIST

## 2021-06-30 PROCEDURE — 82962 GLUCOSE BLOOD TEST: CPT

## 2021-06-30 PROCEDURE — 99232 SBSQ HOSP IP/OBS MODERATE 35: CPT | Performed by: INTERNAL MEDICINE

## 2021-06-30 PROCEDURE — 80061 LIPID PANEL: CPT | Performed by: NURSE PRACTITIONER

## 2021-06-30 PROCEDURE — 70551 MRI BRAIN STEM W/O DYE: CPT

## 2021-06-30 PROCEDURE — 70450 CT HEAD/BRAIN W/O DYE: CPT

## 2021-06-30 PROCEDURE — 99233 SBSQ HOSP IP/OBS HIGH 50: CPT | Performed by: PSYCHIATRY & NEUROLOGY

## 2021-06-30 PROCEDURE — 97161 PT EVAL LOW COMPLEX 20 MIN: CPT

## 2021-06-30 PROCEDURE — 83735 ASSAY OF MAGNESIUM: CPT | Performed by: INTERNAL MEDICINE

## 2021-06-30 PROCEDURE — 97116 GAIT TRAINING THERAPY: CPT

## 2021-06-30 PROCEDURE — 80048 BASIC METABOLIC PNL TOTAL CA: CPT | Performed by: INTERNAL MEDICINE

## 2021-06-30 RX ADMIN — ATORVASTATIN CALCIUM 80 MG: 40 TABLET, FILM COATED ORAL at 21:10

## 2021-06-30 RX ADMIN — ASPIRIN 325 MG ORAL TABLET 325 MG: 325 PILL ORAL at 13:09

## 2021-06-30 RX ADMIN — SODIUM CHLORIDE, PRESERVATIVE FREE 10 ML: 5 INJECTION INTRAVENOUS at 08:44

## 2021-06-30 RX ADMIN — SODIUM CHLORIDE, PRESERVATIVE FREE 10 ML: 5 INJECTION INTRAVENOUS at 21:10

## 2021-07-01 VITALS
HEIGHT: 70 IN | SYSTOLIC BLOOD PRESSURE: 134 MMHG | WEIGHT: 225 LBS | OXYGEN SATURATION: 96 % | RESPIRATION RATE: 18 BRPM | HEART RATE: 63 BPM | BODY MASS INDEX: 32.21 KG/M2 | DIASTOLIC BLOOD PRESSURE: 82 MMHG | TEMPERATURE: 97.8 F

## 2021-07-01 LAB
ANION GAP SERPL CALCULATED.3IONS-SCNC: 8 MMOL/L (ref 5–15)
BASOPHILS # BLD AUTO: 0.04 10*3/MM3 (ref 0–0.2)
BASOPHILS NFR BLD AUTO: 0.6 % (ref 0–1.5)
BUN SERPL-MCNC: 17 MG/DL (ref 8–23)
BUN/CREAT SERPL: 15.3 (ref 7–25)
CALCIUM SPEC-SCNC: 9 MG/DL (ref 8.6–10.5)
CHLORIDE SERPL-SCNC: 110 MMOL/L (ref 98–107)
CO2 SERPL-SCNC: 23 MMOL/L (ref 22–29)
CREAT SERPL-MCNC: 1.11 MG/DL (ref 0.76–1.27)
DEPRECATED RDW RBC AUTO: 42.8 FL (ref 37–54)
EOSINOPHIL # BLD AUTO: 0.25 10*3/MM3 (ref 0–0.4)
EOSINOPHIL NFR BLD AUTO: 3.6 % (ref 0.3–6.2)
ERYTHROCYTE [DISTWIDTH] IN BLOOD BY AUTOMATED COUNT: 12.3 % (ref 12.3–15.4)
GFR SERPL CREATININE-BSD FRML MDRD: 66 ML/MIN/1.73
GLUCOSE BLDC GLUCOMTR-MCNC: 110 MG/DL (ref 70–130)
GLUCOSE BLDC GLUCOMTR-MCNC: 118 MG/DL (ref 70–130)
GLUCOSE SERPL-MCNC: 104 MG/DL (ref 65–99)
HCT VFR BLD AUTO: 34.6 % (ref 37.5–51)
HGB BLD-MCNC: 11.5 G/DL (ref 13–17.7)
IMM GRANULOCYTES # BLD AUTO: 0.03 10*3/MM3 (ref 0–0.05)
IMM GRANULOCYTES NFR BLD AUTO: 0.4 % (ref 0–0.5)
LYMPHOCYTES # BLD AUTO: 2.62 10*3/MM3 (ref 0.7–3.1)
LYMPHOCYTES NFR BLD AUTO: 37.7 % (ref 19.6–45.3)
MCH RBC QN AUTO: 31.9 PG (ref 26.6–33)
MCHC RBC AUTO-ENTMCNC: 33.2 G/DL (ref 31.5–35.7)
MCV RBC AUTO: 95.8 FL (ref 79–97)
MONOCYTES # BLD AUTO: 0.7 10*3/MM3 (ref 0.1–0.9)
MONOCYTES NFR BLD AUTO: 10.1 % (ref 5–12)
NEUTROPHILS NFR BLD AUTO: 3.31 10*3/MM3 (ref 1.7–7)
NEUTROPHILS NFR BLD AUTO: 47.6 % (ref 42.7–76)
NRBC BLD AUTO-RTO: 0 /100 WBC (ref 0–0.2)
PLATELET # BLD AUTO: 169 10*3/MM3 (ref 140–450)
PMV BLD AUTO: 9.3 FL (ref 6–12)
POTASSIUM SERPL-SCNC: 4.2 MMOL/L (ref 3.5–5.2)
RBC # BLD AUTO: 3.61 10*6/MM3 (ref 4.14–5.8)
SODIUM SERPL-SCNC: 141 MMOL/L (ref 136–145)
WBC # BLD AUTO: 6.95 10*3/MM3 (ref 3.4–10.8)

## 2021-07-01 PROCEDURE — 80048 BASIC METABOLIC PNL TOTAL CA: CPT | Performed by: INTERNAL MEDICINE

## 2021-07-01 PROCEDURE — 85025 COMPLETE CBC W/AUTO DIFF WBC: CPT | Performed by: INTERNAL MEDICINE

## 2021-07-01 PROCEDURE — 99239 HOSP IP/OBS DSCHRG MGMT >30: CPT | Performed by: INTERNAL MEDICINE

## 2021-07-01 PROCEDURE — G0108 DIAB MANAGE TRN  PER INDIV: HCPCS

## 2021-07-01 PROCEDURE — 82962 GLUCOSE BLOOD TEST: CPT

## 2021-07-01 PROCEDURE — 99233 SBSQ HOSP IP/OBS HIGH 50: CPT | Performed by: PSYCHIATRY & NEUROLOGY

## 2021-07-01 RX ORDER — ASPIRIN 81 MG/1
81 TABLET ORAL DAILY
Qty: 90 TABLET | Refills: 0 | Status: SHIPPED | OUTPATIENT
Start: 2021-07-02

## 2021-07-01 RX ORDER — ASPIRIN 81 MG/1
81 TABLET ORAL DAILY
Status: DISCONTINUED | OUTPATIENT
Start: 2021-07-01 | End: 2021-07-01 | Stop reason: HOSPADM

## 2021-07-01 RX ORDER — ROSUVASTATIN CALCIUM 40 MG/1
40 TABLET, COATED ORAL DAILY
Qty: 30 TABLET | Refills: 0 | Status: SHIPPED | OUTPATIENT
Start: 2021-07-01

## 2021-07-01 RX ADMIN — ASPIRIN 81 MG: 81 TABLET, COATED ORAL at 09:12

## 2021-07-01 RX ADMIN — SODIUM CHLORIDE, PRESERVATIVE FREE 10 ML: 5 INJECTION INTRAVENOUS at 09:12

## 2021-07-01 RX ADMIN — APIXABAN 5 MG: 5 TABLET, FILM COATED ORAL at 11:26

## 2021-07-01 NOTE — CONSULTS
"Diabetes Education    Patient Name:  Andry Potter  YOB: 1955  MRN: [id number]  Admit Date:  6/29/2021        Saw pt at bedside for diabetes education consult per stroke protocol. Pt gave permission for visit. Pt confirms he takes metformin at home. He does not check blood sugars at home. Discussed his current A1c of 6.2, within ADA recommendations of A1c less than 7%. He states he has PCP that he sees regularly. Stressed importance of follow up with PCP.  Reviewed type 2 diabetes self-management, risk factors, and importance of blood glucose control to reduce complications. Signs, symptoms, and treatment of hyperglycemia and hypoglycemia were reviewed. Lifestyle changes such as physical activity with MD approval and healthy eating were encouraged. Pt states no specific questions at this time.   Pt has been scheduled for OP diabetes/stroke follow up class on 7/14 and was provided appointment letter and our contact info.   Provided [facility]'s \"what is A1c\" handout and AHA \"The Connection between diabetes and stroke\".  Thank you for the consult.     Electronically signed by:  Nery Barrera RN, [facility]  07/01/21 13:29 EDT  "

## 2021-07-01 NOTE — DISCHARGE SUMMARY
[facility] Medicine Services  DISCHARGE SUMMARY    Patient Name: Andry Potter  : 1955  MRN: [id number]    Date of Admission: 2021 10:14 AM  Date of Discharge: 2021  Primary Care Physician: Jesse Moctezuma MD    Consults     No orders found from 2021 to 2021.          Hospital Course     Presenting Problem:   Acute ischemic right middle cerebral artery (MCA) stroke (CMS/HCC) [I63.511]    Active Hospital Problems    Diagnosis  POA   • **Acute ischemic right MCA stroke, status post TPA and right thrombectomy  [I63.511]  Yes   • Acute ischemic right middle cerebral artery (MCA) stroke (CMS/HCC) [I63.511]  Yes   • CKD (chronic kidney disease) stage 2, GFR 60-89 ml/min [N18.2]  Unknown   • Moderate obstructive sleep apnea [G47.33]  Yes   • Type 2 diabetes mellitus (CMS/HCC) [E11.9]  Yes   • Essential hypertension [I10]  Yes   • CAD (coronary artery disease) [I25.10]  Yes      Resolved Hospital Problems   No resolved problems to display.          Hospital Course:  Andry Potter is a 66 y.o. male with history of hypertension, dyslipidemia, CAD, obesity who presented on  for left-sided weakness.  Patient received IV TPA in the ER and underwent mechanical thrombectomy for right M2 occlusion.  He was initially admitted to the ICU and transferred up to the floor on .     Right MCA stroke  -Status post successful mechanical thrombectomy for right M2 occlusion and IV TPA in the ER  -Continue Eliquis 5 mg twice daily per neuro, will need outpatient loop recorder as underlying A. fib suspected as etiology  -ASA 81 mg     Hypertension  -Currently controlled, on no meds     Diabetes type 2  -A1c 6.2     Dyslipidemia  -Previously on Crestor, increased to 40 mg daily           Discharge Follow Up Recommendations for outpatient labs/diagnostics:  Dr. Walker for outpatient Holter    Day of Discharge     HPI:   Doing well, no complaints, all questions  answered    Review of Systems  Gen- No fevers, chills  CV- No chest pain, palpitations  Resp- No cough, dyspnea  GI- No N/V/D, abd pain        Vital Signs:   Temp:  [97.6 °F (36.4 °C)-98.9 °F (37.2 °C)] 98.3 °F (36.8 °C)  Heart Rate:  [58-76] 66  Resp:  [16-18] 18  BP: ()/() 122/88     Physical Exam:  Constitutional: No acute distress, awake, alert  HENT: NCAT, mucous membranes moist  Respiratory: Clear to auscultation bilaterally, respiratory effort normal   Cardiovascular: RRR, no murmurs, rubs, or gallops  Gastrointestinal: Positive bowel sounds, soft, nontender, nondistended  Musculoskeletal: No bilateral ankle edema  Psychiatric: Appropriate affect, cooperative  Neurologic: Oriented x 3,  speech clear  Skin: No rashes      Pertinent  and/or Most Recent Results     LAB RESULTS:      Lab 07/01/21  0351 06/30/21  0410 06/29/21  1128 06/29/21  1044   WBC 6.95 9.40  --  7.21   HEMOGLOBIN 11.5* 11.4*  --  12.2*   HEMATOCRIT 34.6* 34.0*  --  36.7*   PLATELETS 169 178  --  188   NEUTROS ABS 3.31 5.36  --  4.20   IMMATURE GRANS (ABS) 0.03 0.03  --  0.02   LYMPHS ABS 2.62 2.83  --  2.14   MONOS ABS 0.70 0.82  --  0.63   EOS ABS 0.25 0.31  --  0.17   MCV 95.8 96.6  --  96.3   PROTIME  --   --  12.5*  --          Lab 07/01/21  0351 06/30/21  0410 06/29/21  1044   SODIUM 141 140 143   POTASSIUM 4.2 4.1 4.1   CHLORIDE 110* 109* 111*   CO2 23.0 22.0 21.0*   ANION GAP 8.0 9.0 11.0   BUN 17 16 20   CREATININE 1.11 1.17 1.31*   GLUCOSE 104* 98 152*   CALCIUM 9.0 8.7 9.3   MAGNESIUM  --  1.8 1.9   HEMOGLOBIN A1C  --  6.20*  --          Lab 06/29/21  1044   TOTAL PROTEIN 6.4   ALBUMIN 4.00   GLOBULIN 2.4   ALT (SGPT) 18   AST (SGOT) 15   BILIRUBIN 0.3   ALK PHOS 53         Lab 06/29/21  1128 06/29/21  1044   TROPONIN T  --  <0.010   PROTIME 12.5*  --    INR 1.0  --          Lab 06/30/21  0410   CHOLESTEROL 126   LDL CHOL 63   HDL CHOL 28*   TRIGLYCERIDES 210*             Brief Urine Lab Results  (Last result in the  past 365 days)      Color   Clarity   Blood   Leuk Est   Nitrite   Protein   CREAT   Urine HCG        06/30/21 1826 Yellow Clear Negative Trace Negative Negative             Microbiology Results (last 10 days)     Procedure Component Value - Date/Time    COVID PRE-OP / PRE-PROCEDURE SCREENING ORDER (NO ISOLATION) - Swab, Nasopharynx [[id number]]  (Normal) Collected: 06/29/21 1141    Lab Status: Final result Specimen: Swab from Nasopharynx Updated: 06/29/21 1217    Narrative:      The following orders were created for panel order COVID PRE-OP / PRE-PROCEDURE SCREENING ORDER (NO ISOLATION) - Swab, Nasopharynx.  Procedure                               Abnormality         Status                     ---------                               -----------         ------                     COVID-19, ABBOTT IN-HOUS...[[id number]]  Normal              Final result                 Please view results for these tests on the individual orders.    COVID-19, ABBOTT IN-HOUSE,NASAL Swab (NO TRANSPORT MEDIA) 2 HR TAT - Swab, Nasopharynx [[id number]]  (Normal) Collected: 06/29/21 1141    Lab Status: Final result Specimen: Swab from Nasopharynx Updated: 06/29/21 1217     COVID19 Presumptive Negative    Narrative:      Fact sheet for providers: https://www.fda.gov/media/630873/download     Fact sheet for patients: https://www.fda.gov/media/701181/download    Test performed by PCR.  If inconsistent with clinical signs and symptoms patient should be tested with different authorized molecular test.          CT Head Without Contrast    Result Date: 6/30/2021  EXAMINATION: CT HEAD WO CONTRAST-  INDICATION: Stroke, follow up  TECHNIQUE: Axial noncontrast CT of the head performed per dual-energy protocol with energy specific iodine map, virtual noncontrast and combined dual-energy sequences post processed.  The radiation dose reduction device was turned on for each scan per the ALARA (As Low as Reasonably Achievable) protocol.  COMPARISON: CT head  1 day prior  FINDINGS: Combined energy scanning demonstrates no evidence of hyperdensity to suggest significant contrast staining or hemorrhage. No areas of abnormal hyperdensity on either the iodine map or on the virtual nonenhanced images. There is no definite transcortical hypoattenuation to specifically identify right MCA territory infarct. There is no mass or mass effect. The ventricles are unchanged in size and configuration.      Dual-energy scan demonstrating no evidence of hemorrhage or significant contrast staining. No evidence of acute territorial infarct on CT.   This report was finalized on 6/30/2021 8:44 AM by Tenzin Warren.      CT Angiogram Neck    Result Date: 6/29/2021  EXAMINATION: CT ANGIOGRAM HEAD W AI ANALYSIS OF LVO-, CT ANGIOGRAM NECK-   INDICATION: stroke  TECHNIQUE: Axial IV arterial phase contrast-enhanced CT angiogram of the head and neck. Three-dimensional reconstructions were postprocessed.  The radiation dose reduction device was turned on for each scan per the ALARA (As Low as Reasonably Achievable) protocol.  AI analysis of LVO was utilized.  COMPARISON: NONE  FINDINGS: The lung apices are unremarkable. Evaluation of the neck soft tissues demonstrates no unexpected soft tissue neck mass or pathologic adenopathy. The osseous structures demonstrate no evidence of fracture or malalignment. Minimally atherosclerotic aortic arch and great vessel origins. On the right, calcific atherosclerotic change of the ICA origin is present with mild, less than 50% narrowing by NASCET criteria. Atherosclerotic changes also present on the left with less than 50% luminal narrowing associated. The vertebral arteries are normal in course and caliber bilaterally. Intracranially, the carotid siphons demonstrate significant calcific atherosclerotic change with moderate to severe narrowing of the cavernous segments bilaterally, worse on the right. There is developmental absence or less likely occlusion of  the right A1 segment anterior cerebral artery with a prominent azygos CARLOS A bifurcating distally there is faint focal poor opacification of the sylvian M2 branch on the right, possible partially occlusive thrombus versus focal atherosclerotic change. The left MCA is normal. The posterior cerebral arteries are also normal in course and caliber. The vertebrobasilar system demonstrates some minimal atherosclerotic change without significant resultant luminal narrowing.        Atherosclerotic change of bilateral ICA origins, somewhat greater on the left and with prominent component of soft plaque, however without significant associated luminal narrowing, less than 50% bilaterally.  Faint focal poor opacification of a sylvian M2 branch on the right, possible partially occlusive thrombus versus focal atherosclerotic change  Significant calcific atherosclerotic change of the carotid siphons bilaterally, with moderate to severe narrowing of the cavernous segments, somewhat worse on the right. Hypoplasia or less likely chronic occlusion of the right A1 segment CARLOS A with prominent azygous CARLOS A noted.  Results discussed with the stroke navigated by Tenzin Warren via phone at 11:54 AM 6/29/2021  This report was finalized on 6/29/2021 11:58 AM by Tenzin Warren.      MRI Brain Without Contrast    Result Date: 6/30/2021  EXAMINATION: MRI BRAIN WO CONTRAST-  INDICATION: Stroke, follow up; I63.511-Cerebral infarction due to unspecified occlusion or stenosis of right middle cerebral artery; R53.1-Weakness; I10-Essential (primary) hypertension  TECHNIQUE: Multiplanar multisequence MRI of the brain performed without IV contrast  COMPARISON: CT head earlier the same day  FINDINGS: Tiny areas of diffusion restriction are present compatible with minimal areas of acute infarct in the right MCA territory, notably involving the insular cortex with additional scattered punctate involvement more superiorly in frontal and parietal lobes. No  "large territorial infarct. No evidence of associated hemorrhage or mass effect. There are moderate age-related changes of the brain including atrophy and periventricular T2 hyperintense white matter sequela of chronic small vessel ischemia. There is otherwise no evidence of intracranial mass or mass effect. The ventricles are normal in size and configuration. The orbits are unremarkable and the paranasal sinuses are grossly clear.      Scattered tiny areas of acute infarct noted in the right MCA territory involving the insular cortex, with additional scattered punctate frontal and parietal lobe involvement. No large acute territorial infarct. No evidence of hemorrhage or mass effect.   This report was finalized on 6/30/2021 12:02 PM by Tenzin Warren.      Cardiac Catheterization/Vascular Study    Result Date: 6/29/2021  Clinical Indication: 66-year-old male who developed sudden onset of facial droop, slurred speech, and some left-sided weakness.  [facility] presented to [facility] and was administered IV TPA therapy, per protocol.  CTA/CT perfusion demonstrated occlusion of a proximal M2 branch of the right middle cerebral artery, with substantial ischemic \"penumbra\", along with a favorable ASPECTS.  : Dr. Dru Gomes. Access: Right common femoral artery. Estimated blood loss: Less than 30 mL Complication: None apparent. Procedures: 1.  Mechanical thrombectomy (Trevo stent retriever), right middle cerebral artery (MCA) 2.  Placement of an arterial closure device. Technique: The patient was brought emergently to the cardiac catheterization suite.  The right groin region was prepped and draped in the usual, sterile fashion.  Local anesthesia with 1% lidocaine infiltration was achieved.  Additionally, intravenous fentanyl and Versed were given for patient comfort throughout the procedure, the details of which are documented in the nursing record.  Utilizing micropuncture technique, a 5 Citizen of the [place] vascular " "sheath was placed into the right common femoral artery without difficulty.  Subsequently, a Berenstein catheter was advanced into the aortic arch and used to select the right common carotid and internal carotid arteries under fluoroscopic guidance.  Appropriate angiographic sequences were filmed following contrast injection.  Findings: Selective right internal carotid artery catheterization and carotid angiography: The cervical portions of the right carotid vasculature demonstrate mild, smooth appearing plaque at the right carotid bifurcation, but without appreciable stenosis utilizing NASCET criteria.  There are no cervical dissections were ulcerative lesions, specifically there is no \"culprit\" lesions are identified.  The intracranial circulation was opacified via a right internal carotid injection, demonstrating abrupt occlusion of the superior division of the right MCA bifurcation (proximal M2 branch), consistent with an acute thromboembolic event.  This represents TICI 2A flow to the entirety of the right MCA vascular territory.  No additional large vessel occlusions are identified.  There are no changes of vasculitis or vasospasm.  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 The Berenstein catheter and 5 [place] sheath were exchanged over a Valencia wire for an 8F sheath and 8F FlowGate balloon guide catheter which was advanced into the high cervical right internal carotid artery without difficulty. Under fluoroscopic guidance, a Trak 21 microcatheter was advanced over Transcend EX soft tip microwire into the intracranial circulation and passed the superior division M2 MCA occlusion without difficulty.  Microcatheter injection confirms appropriate placement within the M2 branch distal to the embolism/occlusion.  Next, a 3 mm stent retriever was advanced through the microcatheter and deployed across the right M2 MCA " "occlusion without difficulty.  After approximately 3-5 minute wait period, the Trevo device and microcatheter were removed utilizing temporary balloon occlusion technique and vigorous aspiration of the balloon guide catheter.  This resulted in successful extraction of a sizable thrombus.  Follow-up angiography demonstrates restoration of normal flow (TICI 3) to the right MCA vascular territory.  There were no emboli to new vascular territory identified.  No complicating features.  At the end of the procedure, all catheters and sheaths were removed from the groin and hemostasis was achieved at the puncture site utilizing manual compression and placement of a 8 [place] Angio-Seal arterial closure device. Impression: Abrupt occlusion of the superior division (proximal M2) branch of the right MCA bifurcation, consistent with an acute thromboembolic occlusion.  This responded well to mechanical thrombectomy (Trevo stent retriever), restoring normal flow (TICI 3) to the right cerebral hemisphere.  There were no emboli to new vascular territory or complicating features.  There was no carotid \"culprit\" lesion, and the aforementioned thromboembolism is concerning for a cardiac thromboembolic source.    XR Chest 1 View    Result Date: 6/29/2021  EXAMINATION: XR CHEST 1 VW-  INDICATION: Weak/Dizzy/AMS triage protocol  COMPARISON: NONE  FINDINGS: No focal airspace opacity. No pleural effusion or pneumothorax. Normal heart and mediastinal contours.      No evidence of acute disease in the chest.  This report was finalized on 6/29/2021 10:43 AM by Tenzin Warren.      CT Head Without Contrast Stroke Protocol    Result Date: 6/29/2021  EXAMINATION: CT HEAD WO CONTRAST STROKE PROTOCOL-  INDICATION: NIH 4  TECHNIQUE: Axial noncontrast CT of the head with multiplanar reconstruction  The radiation dose reduction device was turned on for each scan per the ALARA (As Low as Reasonably Achievable) protocol.  COMPARISON: NONE  FINDINGS: " Gray-white differentiation is maintained and there is no evidence of intracranial hemorrhage, mass or mass effect. Age-related changes of the brain are present including volume loss and typical periventricular sequela of chronic small vessel ischemia. There is otherwise no evidence of intracranial hemorrhage, mass or mass effect. The ventricles are normal in size and configuration accounting for surrounding volume loss. The orbits are normal and the paranasal sinuses are grossly clear.      Age-related changes of the brain as above, otherwise without evidence of acute intracranial abnormality.  Scan performed 6/29/2021 11:13 AM, results discussed with the stroke team in person by Tenzin Warren at 11:17 AM same day  This report was finalized on 6/29/2021 11:45 AM by Tenzin Warren.      CT Angiogram Head w AI Analysis of LVO    Result Date: 6/29/2021  EXAMINATION: CT ANGIOGRAM HEAD W AI ANALYSIS OF LVO-, CT ANGIOGRAM NECK-   INDICATION: stroke  TECHNIQUE: Axial IV arterial phase contrast-enhanced CT angiogram of the head and neck. Three-dimensional reconstructions were postprocessed.  The radiation dose reduction device was turned on for each scan per the ALARA (As Low as Reasonably Achievable) protocol.  AI analysis of LVO was utilized.  COMPARISON: NONE  FINDINGS: The lung apices are unremarkable. Evaluation of the neck soft tissues demonstrates no unexpected soft tissue neck mass or pathologic adenopathy. The osseous structures demonstrate no evidence of fracture or malalignment. Minimally atherosclerotic aortic arch and great vessel origins. On the right, calcific atherosclerotic change of the ICA origin is present with mild, less than 50% narrowing by NASCET criteria. Atherosclerotic changes also present on the left with less than 50% luminal narrowing associated. The vertebral arteries are normal in course and caliber bilaterally. Intracranially, the carotid siphons demonstrate significant calcific  atherosclerotic change with moderate to severe narrowing of the cavernous segments bilaterally, worse on the right. There is developmental absence or less likely occlusion of the right A1 segment anterior cerebral artery with a prominent azygos CARLOS A bifurcating distally there is faint focal poor opacification of the sylvian M2 branch on the right, possible partially occlusive thrombus versus focal atherosclerotic change. The left MCA is normal. The posterior cerebral arteries are also normal in course and caliber. The vertebrobasilar system demonstrates some minimal atherosclerotic change without significant resultant luminal narrowing.        Atherosclerotic change of bilateral ICA origins, somewhat greater on the left and with prominent component of soft plaque, however without significant associated luminal narrowing, less than 50% bilaterally.  Faint focal poor opacification of a sylvian M2 branch on the right, possible partially occlusive thrombus versus focal atherosclerotic change  Significant calcific atherosclerotic change of the carotid siphons bilaterally, with moderate to severe narrowing of the cavernous segments, somewhat worse on the right. Hypoplasia or less likely chronic occlusion of the right A1 segment CARLOS A with prominent azygous CARLOS A noted.  Results discussed with the stroke navigated by Tenzin Warren via phone at 11:54 AM 6/29/2021  This report was finalized on 6/29/2021 11:58 AM by Tenzin Warren.      CT CEREBRAL PERFUSION WITH & WITHOUT CONTRAST    Result Date: 6/29/2021  EXAMINATION: CT CEREBRAL PERFUSION W WO CONTRAST-  INDICATION: stroke  TECHNIQUE: Cerebral perfusion analysis was performed using computed tomography with contrast administration, including post processing of parametric maps with determination of cerebral blood flow, cerebral blood volume, and mean transit time.  The radiation dose reduction device was turned on for each scan per the ALARA (As Low as Reasonably  Achievable) protocol.  COMPARISON: Noncontrast CT head performed concurrently  FINDINGS: There is prolonged Tmax and time to drain within a partial right MCA distribution, without evidence of diminished cerebral blood volume to suggest core infarct, overall concerning for ischemic tissue at risk.      Findings concerning for right MCA territory ischemic tissue at risk, without evidence of definite underlying core infarct.  Results discussed with the stroke Navigator by Tenzin Warren via phone at 11:54 AM 6/29/2021  This report was finalized on 6/29/2021 12:00 PM by Tenzin Warren.                    Plan for Follow-up of Pending Labs/Results:     Discharge Details        Discharge Medications      New Medications      Instructions Start Date   apixaban 5 MG tablet tablet  Commonly known as: ELIQUIS   5 mg, Oral, Every 12 Hours Scheduled         Changes to Medications      Instructions Start Date   rosuvastatin 40 MG tablet  Commonly known as: Crestor  What changed:   · medication strength  · how much to take   40 mg, Oral, Daily         Continue These Medications      Instructions Start Date   aspirin 81 MG EC tablet   81 mg, Oral, Daily   Start Date: July 2, 2021     lisinopril 10 MG tablet  Commonly known as: PRINIVIL,ZESTRIL   10 mg, Oral, Daily      metFORMIN 1000 MG tablet  Commonly known as: GLUCOPHAGE   1,000 mg, Oral, 2 Times Daily With Meals      tamsulosin 0.4 MG capsule 24 hr capsule  Commonly known as: FLOMAX   1 capsule, Daily      VITAMIN B-3 PO   1 tablet, Oral, Daily             No Known Allergies      Discharge Disposition:  Home or Self Care    Diet:  Hospital:  Diet Order   Procedures   • Diet Regular; Thin; Cardiac, Consistent Carbohydrate       Activity:      Restrictions or Other Recommendations:  Continue Eliquis, call Dr. Walker's office to get a Holter       CODE STATUS:    There are no questions and answers to display.       Future Appointments   Date Time Provider Department Center     7/14/2021 11:00 AM [facility]   10/4/2021  1:00 PM Annette Owens PA-C [facility]   1/31/2022  9:15 AM Annemarie Martino APRN [facility]       Additional Instructions for the Follow-ups that You Need to Schedule     Discharge Follow-up with PCP   As directed       Currently Documented PCP:    Jesse Moctezuma MD    PCP Phone Number:    [phone]     Follow Up Details: 1 week         Discharge Follow-up with Specified Provider: Dr Walker   As directed      To: Dr Walker    Follow Up Details: call office to get holter monitor         Discharge Follow-up with Specified Provider: Dr. Walker; 1 Month   As directed      To: Dr. Walker    Follow Up: 1 Month         Discharge Follow-up with Specified Provider: Annette Owens; 3 Months   As directed      To: Annette Owens    Follow Up: 3 Months    Follow Up Details: f/u with Annette Owens in Dr. Gomes's office in 3 months                     Kenzie Ibarra DO  07/01/21      Time Spent on Discharge:  I spent  32  minutes on this discharge activity which included: face-to-face encounter with the patient, reviewing the data in the system, coordination of the care with the nursing staff as well as consultants, documentation, and entering orders.

## 2021-07-01 NOTE — PROGRESS NOTES
Stroke Progress Note       Chief Complaint: Left-sided weakness    Subjective    Subjective     Subjective:  No acute issues overnight.  Patient is currently sitting in the recliner and feels he is back to his baseline.  He feels that he is ready to go home.  He denies recurrence of any symptoms.    Review of Systems   Constitutional: Negative.    HENT: Negative.    Eyes: Negative.    Respiratory: Negative.    Cardiovascular: Negative.    Gastrointestinal: Negative.    Endocrine: Negative.    Genitourinary: Negative.    Musculoskeletal: Negative.    Skin: Negative.    Allergic/Immunologic: Negative.    Psychiatric/Behavioral: Negative.             Objective    Objective      Temp:  [97.6 °F (36.4 °C)-98.9 °F (37.2 °C)] 98.3 °F (36.8 °C)  Heart Rate:  [58-76] 66  Resp:  [16-18] 18  BP: ()/() 122/88    Neurological Exam  Mental Status  Awake, alert and oriented to person, place and time.Alert. Oriented to person, place and time. Speech is normal. Language is fluent with no aphasia. Attention and concentration are normal. Fund of knowledge is appropriate for level of education.    Cranial Nerves  CN II: Visual fields full to confrontation.  CN III, IV, VI: Extraocular movements intact bilaterally. Normal lids and orbits bilaterally. Pupils equal round and reactive to light bilaterally.  CN V: Facial sensation is normal.  CN VII: Full and symmetric facial movement.  CN VIII: Equal hearing bilaterally.  CN IX, X: Palate elevates symmetrically. Normal gag reflex.  CN XI: Shoulder shrug strength is normal.  CN XII: Tongue midline without atrophy or fasciculations.    Motor  Normal muscle bulk throughout. No fasciculations present. Normal muscle tone. Strength is 5/5 throughout all four extremities.    Sensory  Light touch is normal in upper and lower extremities.     Reflexes  Deep tendon reflexes are 2+ and symmetric in all four extremities with downgoing toes bilaterally.    Coordination  Finger-to-nose,  rapid alternating movements and heel-to-shin normal bilaterally without dysmetria.    Gait  Not assessed.      Physical Exam  Vitals and nursing note reviewed.   Constitutional:       Appearance: Normal appearance. He is obese.   HENT:      Head: Normocephalic and atraumatic.      Mouth/Throat:      Mouth: Mucous membranes are moist.      Pharynx: Oropharynx is clear.   Eyes:      General: Lids are normal.      Extraocular Movements: Extraocular movements intact.      Pupils: Pupils are equal, round, and reactive to light.   Cardiovascular:      Rate and Rhythm: Normal rate and regular rhythm.   Pulmonary:      Effort: Pulmonary effort is normal. No respiratory distress.   Musculoskeletal:      Cervical back: Normal range of motion and neck supple.   Skin:     General: Skin is warm and dry.   Neurological:      Mental Status: He is alert.      Coordination: Coordination is intact.      Deep Tendon Reflexes: Strength normal and reflexes are normal and symmetric.   Psychiatric:         Mood and Affect: Mood normal.         Speech: Speech normal.         Behavior: Behavior normal.       Results Review:    I reviewed the patient's new clinical results.  MRI brain reveals small scattered areas of acute infarct within the right MCA territory involving the insular cortex, frontal lobe, and parietal lobe.    A1c 6.2, LDL 63, CBC/CMP looks okay  Urinalysis positive for trace leuk esterase        Assessment/Plan     Assessment/Plan:  66-year-old right-handed white male with known diagnosis of hypertension, hyperlipidemia, coronary artery disease, morbid obesity, who came in with left-sided weakness, and got IV TPA and mechanical thrombectomy for right M2 occlusion.      #1.  Right middle cerebral artery stroke.  Patient was noted to have acute onset of right MCA stroke, for which he got IV TPA.  He was also taken to Cath Lab where he got successful mechanical thrombectomy for right M2 occlusion with TICI 3 reperfusion.  He  continues to do very well and is back to his neurological baseline, with no neurological deficits.  MRI of the brain without contrast revealed small scattered areas of acute infarct within the right MCA territory involving the insular cortex, frontal lobe, and parietal lobe.  Aspirin 81 mg and Eliquis 5 mg twice daily for secondary stroke prevention have been initiated given that he likely has underlying atrial fibrillation as the etiology for his stroke.  This was discussed with his cardiologist Dr. Walker, who will arrange for a loop recorder as an outpatient.     #2.  Status post TPA given in emergency room.       #3.  Essential hypertension.  Normal blood pressure goals, with goal systolic blood pressure of less than 140.  Blood pressures have remained stable throughout hospitalization without use of oral antihypertensive.     #4.  Diabetes mellitus type 2.  Well-controlled without use of insulin.  His A1c is at goal at 6.2. Maintain normoglycemia.  Continue to follow with primary care physician for A1c rechecks.     #5.  Dyslipidemia.  Patient is on Crestor 20 mg at home.    This should be increased to 40 mg at discharge to meet high intensity statin requirements.   His LDL is 63, total cholesterol 126 and triglyceride of 216.     #6.  PT/OT has signed off.       #7.  Healthy heart/diabetic diet.     The patient can be discharged from a neurological perspective.  Case was discussed with patient, nursing and the hospitalist.  We will sign off for now, please call us with questions.      Jeanine Flynn RN EXTENDER  07/01/21  10:55 EDT    I have personally seen, interviewed and examined the patient and verified all the key components of the history, physical examination, assessment and plan with Jeanine Flynn RN Extender/Stroke Navigator and reviewed the note, which reflects my changes and contributions.

## 2021-07-01 NOTE — PROGRESS NOTES
Patient is on Apixaban.  Education provided on 7/1/21 verbally and in writing.  Information provided includes effects of medication, drug-drug and drug-food interactions, and signs/symptoms of bleeding and clotting.  Patient verbalized understanding through teach back.  All pertinent questions were answered.     Maryann Gregg, PharmD, BCPS  7/1/2021  12:47 EDT

## 2021-07-02 ENCOUNTER — READMISSION MANAGEMENT (OUTPATIENT)
Dept: CALL CENTER | Facility: HOSPITAL | Age: 66
End: 2021-07-02

## 2021-07-02 LAB
BASE EXCESS BLDA CALC-SCNC: -5 MMOL/L (ref -5–5)
CA-I BLDA-SCNC: 1.29 MMOL/L (ref 1.2–1.32)
CO2 BLDA-SCNC: 21 MMOL/L (ref 24–29)
CREAT BLDA-MCNC: 1.4 MG/DL (ref 0.6–1.3)
GLUCOSE BLDC GLUCOMTR-MCNC: 102 MG/DL (ref 70–130)
HCO3 BLDA-SCNC: 20.1 MMOL/L (ref 22–26)
HCT VFR BLDA CALC: 38 % (ref 38–51)
HGB BLDA-MCNC: 12.9 G/DL (ref 12–17)
PCO2 BLDA: 35.8 MM HG (ref 35–45)
PH BLDA: 7.36 PH UNITS (ref 7.35–7.6)
PO2 BLDA: 44 MMHG (ref 80–105)
POTASSIUM BLDA-SCNC: 4 MMOL/L (ref 3.5–4.9)
SAO2 % BLDA: 78 % (ref 95–98)
SODIUM BLD-SCNC: 146 MMOL/L (ref 138–146)

## 2021-07-02 NOTE — OUTREACH NOTE
Prep Survey      Responses   [facility] facility patient discharged from?  [place]   Is LACE score < 7 ?  No   Emergency Room discharge w/ pulse ox?  No   Eligibility  Readm Mgmt   Discharge diagnosis  Acute ischemic right MCA stroke   Does the patient have one of the following disease processes/diagnoses(primary or secondary)?  Stroke (TIA)   Does the patient have Home health ordered?  No   Is there a DME ordered?  No   Prep survey completed?  Yes          Alyssa Leal RN

## 2021-07-06 ENCOUNTER — READMISSION MANAGEMENT (OUTPATIENT)
Dept: CALL CENTER | Facility: HOSPITAL | Age: 66
End: 2021-07-06

## 2021-07-06 NOTE — OUTREACH NOTE
Stroke Week 1 Survey      Responses   [facility] patient discharged from?  [place]   Does the patient have one of the following disease processes/diagnoses(primary or secondary)?  Stroke (TIA)   Week 1 attempt successful?  Yes   Call start time  0943   Call end time  0945   Discharge diagnosis  Acute ischemic right MCA stroke   Meds reviewed with patient/caregiver?  Yes   Is the patient having any side effects they believe may be caused by any medication additions or changes?  No   Does the patient have all medications ordered at discharge?  Yes   Is the patient taking all medications as directed (includes completed medication regime)?  Yes   Does the patient have a primary care provider?   Yes   Does the patient have an appointment with their PCP within 7 days of discharge?  Yes   Comments regarding PCP  PATIENT WAS UNABLE TO GO TO HIS PCP APPOINTMENT TODAY, BUT STATES HE WILL CALL AND RESCHEDULE   Has the patient kept scheduled appointments due by today?  No   What is preventing the patient from keeping their appointments?  Transportation   Nursing Interventions  Educated on importance of keeping appointment, Advised to reschedule appointment   Has home health visited the patient within 72 hours of discharge?  N/A   Psychosocial issues?  No   Does the patient require any assistance with activities of daily living such as eating, bathing, dressing, walking, etc.?  No   Does the patient have any residual symptoms from stroke/TIA?  No   Does the patient understand the diet ordered at discharge?  Yes   Did the patient receive a copy of their discharge instructions?  Yes   Nursing interventions  Reviewed instructions with patient   What is the patient's perception of their health status since discharge?  Improving   Nursing interventions  Nurse provided patient education   Is the patient able to teach back FAST for Stroke?  Yes   Is the patient/caregiver able to teach back the risk factors for a stroke?  High  blood pressure-goal below 120/80, High Cholesterol, Carotid or other artery disease, History of TIAs, Diabetes, Sleep apnea   Is the patient/caregiver able to teach back signs and symptoms related to disease process for when to call PCP?  Yes   Is the patient/caregiver able to teach back signs and symptoms related to disease process for when to call 911?  Yes   If the patient is a current smoker, are they able to teach back resources for cessation?  Not a smoker   Is the patient/caregiver able to teach back the hierarchy of who to call/visit for symptoms/problems? PCP, Specialist, Home health nurse, Urgent Care, ED, 911  Yes   Week 1 call completed?  Yes          Cait Jaime LPN

## 2021-07-14 ENCOUNTER — HOSPITAL ENCOUNTER (OUTPATIENT)
Dept: DIABETES SERVICES | Facility: HOSPITAL | Age: 66
Setting detail: RECURRING SERIES
Discharge: HOME | End: 2021-07-14

## 2021-07-14 ENCOUNTER — READMISSION MANAGEMENT (OUTPATIENT)
Dept: CALL CENTER | Facility: HOSPITAL | Age: 66
End: 2021-07-14

## 2021-07-14 NOTE — CONSULTS
"Mr. Potter attended follow up outpatient diabetes management education class via phone-15 minute class. Discussed s/sx of hyperglycemia and hypoglycemia and self management of both. Education provided on TLC diet, the plate method, as well as exercise recommendations. Stressed importance of ongoing, lifelong follow up with PCP for diabetes and other chronic health condition management. Stressed importance of taking all medications as prescribed. Patient was given opportunity to ask questions, and was also encouraged to pursue further diabetes education class with both RN and RD. Class educational materials were mailed to patient's home: [facility]'s \"Diabetes Basics\" bookle, as well as our contact information for any further questions or needs.   "

## 2021-07-14 NOTE — OUTREACH NOTE
Stroke Week 2 Survey      Responses   [facility] patient discharged from?  [place]   Does the patient have one of the following disease processes/diagnoses(primary or secondary)?  Stroke (TIA)   Week 2 attempt successful?  Yes   Call start time  1234   Call end time  1235   Discharge diagnosis  Acute ischemic right MCA stroke   Is the patient taking all medications as directed (includes completed medication regime)?  Yes   Does the patient have a primary care provider?   Yes   Does the patient have an appointment with their PCP within 7 days of discharge?  Greater than 7 days   Comments regarding PCP  had to reschedule PCP appt    Has the patient kept scheduled appointments due by today?  N/A   Does the patient require any assistance with activities of daily living such as eating, bathing, dressing, walking, etc.?  No   Does the patient have any residual symptoms from stroke/TIA?  No   What is the patient's perception of their health status since discharge?  Improving   Nursing interventions  Nurse provided patient education   Is the patient able to teach back FAST for Stroke?  Yes   Is the patient/caregiver able to teach back the risk factors for a stroke?  Physical inactivity and obesity, High Cholesterol   Is the patient/caregiver able to teach back signs and symptoms related to disease process for when to call 911?  Yes   Is the patient/caregiver able to teach back the hierarchy of who to call/visit for symptoms/problems? PCP, Specialist, Home health nurse, Urgent Care, ED, 911  Yes   Week 2 call completed?  Yes   Wrap up additional comments  Says he is doing well, had a phone call from a dietician today, no questions or concerns at this time.          Aide Stuart RN

## 2021-07-20 ENCOUNTER — READMISSION MANAGEMENT (OUTPATIENT)
Dept: CALL CENTER | Facility: HOSPITAL | Age: 66
End: 2021-07-20

## 2021-07-20 NOTE — OUTREACH NOTE
Stroke Week 3 Survey      Responses   [facility] patient discharged from?  [place]   Does the patient have one of the following disease processes/diagnoses(primary or secondary)?  Stroke (TIA)   Week 3 attempt successful?  Yes   Call start time  1520   Call end time  1524   Discharge diagnosis  Acute ischemic right MCA stroke   Meds reviewed with patient/caregiver?  Yes   Is the patient having any side effects they believe may be caused by any medication additions or changes?  No   Is the patient taking all medications as directed (includes completed medication regime)?  Yes   Does the patient have a primary care provider?   Yes   Does the patient have an appointment with their PCP within 7 days of discharge?  Greater than 7 days   Comments regarding PCP  had to reschedule PCP appt    Has the patient kept scheduled appointments due by today?  N/A   Psychosocial issues?  Yes   Psychosocial comments  his dad passed away 7-17   Does the patient require any assistance with activities of daily living such as eating, bathing, dressing, walking, etc.?  No   Does the patient have any residual symptoms from stroke/TIA?  No   Does the patient understand the diet ordered at discharge?  Yes   Comments  today is last day of holter monitor.    What is the patient's perception of their health status since discharge?  Returned to baseline/stable   Is the patient/caregiver able to teach back the hierarchy of who to call/visit for symptoms/problems? PCP, Specialist, Home health nurse, Urgent Care, ED, 911  Yes   Week 3 call completed?  Yes   Revoked  No further contact(revokes)-requires comment   Is the patient interested in additional calls from an ambulatory ?  NOTE:  applies to high risk patients requiring additional follow-up.  No   Graduated/Revoked comments  at baseline          Tamiko Ac RN

## 2021-09-21 ENCOUNTER — OFFICE VISIT (OUTPATIENT)
Dept: FAMILY MEDICINE CLINIC | Facility: CLINIC | Age: 66
End: 2021-09-21

## 2021-09-21 VITALS
OXYGEN SATURATION: 97 % | SYSTOLIC BLOOD PRESSURE: 126 MMHG | TEMPERATURE: 97.8 F | HEIGHT: 70 IN | WEIGHT: 203 LBS | HEART RATE: 104 BPM | DIASTOLIC BLOOD PRESSURE: 86 MMHG | BODY MASS INDEX: 29.06 KG/M2

## 2021-09-21 DIAGNOSIS — N40.1 BENIGN PROSTATIC HYPERPLASIA WITH INCOMPLETE BLADDER EMPTYING: ICD-10-CM

## 2021-09-21 DIAGNOSIS — I63.511 ACUTE ISCHEMIC RIGHT MCA STROKE (HCC): ICD-10-CM

## 2021-09-21 DIAGNOSIS — G47.33 MODERATE OBSTRUCTIVE SLEEP APNEA: ICD-10-CM

## 2021-09-21 DIAGNOSIS — E11.9 TYPE 2 DIABETES MELLITUS WITHOUT COMPLICATION, WITHOUT LONG-TERM CURRENT USE OF INSULIN (HCC): ICD-10-CM

## 2021-09-21 DIAGNOSIS — Z12.5 ENCOUNTER FOR SCREENING FOR MALIGNANT NEOPLASM OF PROSTATE: ICD-10-CM

## 2021-09-21 DIAGNOSIS — R39.14 BENIGN PROSTATIC HYPERPLASIA WITH INCOMPLETE BLADDER EMPTYING: ICD-10-CM

## 2021-09-21 DIAGNOSIS — F32.0 CURRENT MILD EPISODE OF MAJOR DEPRESSIVE DISORDER WITHOUT PRIOR EPISODE (HCC): Primary | ICD-10-CM

## 2021-09-21 LAB — GLUCOSE BLDC GLUCOMTR-MCNC: 116 MG/DL (ref 70–130)

## 2021-09-21 PROCEDURE — 82043 UR ALBUMIN QUANTITATIVE: CPT | Performed by: NURSE PRACTITIONER

## 2021-09-21 PROCEDURE — 99215 OFFICE O/P EST HI 40 MIN: CPT | Performed by: NURSE PRACTITIONER

## 2021-09-21 RX ORDER — ESCITALOPRAM OXALATE 5 MG/1
5 TABLET ORAL DAILY
Qty: 30 TABLET | Refills: 0 | Status: SHIPPED | OUTPATIENT
Start: 2021-09-21 | End: 2021-10-21

## 2021-09-21 NOTE — PROGRESS NOTES
Andry Potter presents today to establish care.    Establish Care and Med Management       HPI   Pt presents today to establish care.  Pt has a PMH of Type 2 DM (4-5 years), BPH, HLD, HTN, and stroke.  He was seeing Dr. Moctezuma previously.  His family member Yolis Allen accompanies him today.      He was in the hospital in June.  He has good days and bad days.  He will feel slow and sit down.  He has no taste.  EVerything is sugary tasting to him.  He has lost 30-40 lbs since his stroke.  He is no where near where he used to be.  He was a salter.  He has gone downhill since his stroke.  He denies memory issues.  His hands have been getting a little cold.  Brighter lights do affect his vision.  He occasionally has balance issues.  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  He doesn't ride his horse as often as he did before.  He is quieter than he was before.  He lives alone.  He has an upcoming appt with neurology on 10/4.  He saw Aaron also who told him that he was happy with everything and would see him back 2/9/22.  He had to wear a heart monitor for a week.  He told him that everything was good.      He does not check his BS at home.  He denies excessive thirst, hunger, or urination.  He is not drinking water like her should.  His last a1c in June was well controlled.      His b/p has been well controlled every time it is checked.    His LDL at last check was 63, which is under excellent control.    He doesn't really feel like his prostate medication works well for him.  He has always got up 1-2 times at night to urinate.  He feels like his urine stream is weak.  It is hard to get it started.  It always feels like there is urine in his bladder.  He takes the Tamsulosin twice a day.       He has been tolerating the blood thinner well.  He does not have issues with bleeding or excessive bruising.      He has sleep apnea, but he is not using the machine currently as he has tolerance issues.  He has the  nasal pillow mask.  They know he has issues with tolerance of this.      Past Medical History:   Diagnosis Date   • BPH (benign prostatic hyperplasia)    • Diabetes mellitus (CMS/HCC)    • Hyperlipidemia    • Hypertension    • Stroke (CMS/HCC)         Past Surgical History:   Procedure Laterality Date   • CARDIAC CATHETERIZATION N/A 9/9/2020    Procedure: Left Heart Cath 82394;  Surgeon: Bang Walker MD;  Location:  [facility];  Service: Cardiology;  Laterality: N/A;   • COLONOSCOPY     • HAND SURGERY     • HIP BIPOLAR REPLACEMENT     • INTERVENTIONAL RADIOLOGY PROCEDURE N/A 6/29/2021    Procedure: IR MECHANICAL THROMBECTOMY - PRIMARY;  Surgeon: Dru Gomes MD;  Location:  [facility];  Service: Interventional Radiology;  Laterality: N/A;        Family History   Problem Relation Age of Onset   • Diabetes Mother         Social History     Socioeconomic History   • Marital status: Single     Spouse name: Not on file   • Number of children: Not on file   • Years of education: Not on file   • Highest education level: Not on file   Tobacco Use   • Smoking status: Former Smoker     Types: Cigarettes   • Smokeless tobacco: Never Used   • Tobacco comment: quit 30 years ago   Substance and Sexual Activity   • Alcohol use: Yes     Comment: once a month   • Drug use: Never   • Sexual activity: Defer        Current Outpatient Medications on File Prior to Visit   Medication Sig Dispense Refill   • aspirin 81 MG EC tablet Take 1 tablet by mouth Daily. 90 tablet 0   • lisinopril (PRINIVIL,ZESTRIL) 10 MG tablet Take 10 mg by mouth Daily.     • metFORMIN (GLUCOPHAGE) 1000 MG tablet Take 1,000 mg by mouth 2 (Two) Times a Day With Meals.     • Niacin (VITAMIN B-3 PO) Take 1 tablet by mouth Daily.     • rivaroxaban (Xarelto) 10 MG tablet Take 10 mg by mouth Daily.     • rosuvastatin (Crestor) 40 MG tablet Take 1 tablet by mouth Daily. 30 tablet 0   • tamsulosin (FLOMAX) 0.4 MG capsule 24  "hr capsule 1 capsule Daily.     • [DISCONTINUED] apixaban (ELIQUIS) 5 MG tablet tablet Take 1 tablet by mouth Every 12 (Twelve) Hours. Indications: Atrial Fibrillation 60 tablet 0     No current facility-administered medications on file prior to visit.       No Known Allergies     Vitals:    09/21/21 0748   BP: 126/86   Pulse: 104   Temp: 97.8 °F (36.6 °C)   SpO2: 97%   Weight: 92.1 kg (203 lb)   Height: 177.8 cm (70\")   PainSc: 0-No pain        Physical Exam  Vitals reviewed.   Constitutional:       Appearance: Normal appearance.   HENT:      Head: Normocephalic and atraumatic.      Right Ear: Tympanic membrane, ear canal and external ear normal.      Left Ear: Tympanic membrane, ear canal and external ear normal.      Nose: Nose normal.      Mouth/Throat:      Mouth: Mucous membranes are moist.      Pharynx: Oropharynx is clear.   Eyes:      Extraocular Movements: Extraocular movements intact.      Pupils: Pupils are equal, round, and reactive to light.   Cardiovascular:      Rate and Rhythm: Normal rate and regular rhythm.      Heart sounds: Normal heart sounds.   Pulmonary:      Effort: Pulmonary effort is normal.      Breath sounds: Normal breath sounds.   Abdominal:      General: Bowel sounds are normal.      Palpations: Abdomen is soft.      Tenderness: There is no abdominal tenderness. There is no guarding or rebound.   Musculoskeletal:      Cervical back: Neck supple.      Comments: Strength 5/5 to BUE and BLE.   Skin:     General: Skin is warm and dry.   Neurological:      General: No focal deficit present.      Mental Status: He is alert and oriented to person, place, and time.      Comments: Romberg negative.   Psychiatric:         Mood and Affect: Mood normal.         Behavior: Behavior normal.         Thought Content: Thought content normal.         Judgment: Judgment normal.          Diagnoses and all orders for this visit:    1. Current mild episode of major depressive disorder without prior episode " (CMS/[facility]) (Primary) -PHQ-9 Total Score: 11.  Patient is having anhedonia.  His family is very worried about this.  --Start Lexapro 5 mg daily.  Discussed side effects and agrees to use.  -     escitalopram (Lexapro) 5 MG tablet; Take 1 tablet by mouth Daily.  Dispense: 30 tablet; Refill: 0    2. Type 2 diabetes mellitus without complication, without long-term current use of insulin (CMS/[facility]) -A1c was 6.2 at last check.  Random blood sugar today is 116.  Under excellent control.  --Collected microalbumin today.  --Continue Metformin 1000 mg twice a day.  --Check feet daily and report any abnormalities.  --Will check labs on the 30th when they are due.  -     MicroAlbumin, Urine, Random - Urine, Clean Catch; Future  -     Comprehensive Metabolic Panel; Future  -     Lipid Panel; Future  -     PSA Screen; Future  -     CBC & Differential; Future  -     Hemoglobin A1c; Future  -     POC Glucose  -     MicroAlbumin, Urine, Random - Urine, Clean Catch    3. Benign prostatic hyperplasia with incomplete bladder emptying -patient continues to have symptoms despite taking tamsulosin 0.8 mg.  --Referral to urology.  --We will check PSA level.  --Continue tamsulosin.  -     Ambulatory Referral to Urology  -     PSA Screen; Future    4. Encounter for screening for malignant neoplasm of prostate   -     PSA Screen; Future    5.  Acute ischemic right MCA stroke, status post TPA and right thrombectomy -he was hospitalized from 6/29 to 7/1.  MRI showed: Scattered tiny areas of acute infarct noted in the right MCA territory involving the insular cortex, with additional scattered punctate frontal and parietal lobe involvement. No large acute territorial infarct. No evidence of hemorrhage or mass effect.  He had TPA and a right thrombectomy.  Patient has noticed since that time that his everything tastes sweet.  Patient is aware that his stroke can cause sensation changes.  He has an upcoming appointment with neurology in which he will  discuss this further.    6.  Moderate obstructive sleep apnea -patient has a CPAP that he has difficulty using.  He can only leave it on for short periods of time.  --Encouraged patient to continue to wear it each night for as long as he can tolerate it.  With time he should increase his tolerance.    60 minutes spent reviewing chart, interviewing patient, placing orders and referrals, and documenting.      Return in about 4 weeks (around 10/19/2021) for Follow-up.    Meghan Watt, APRN

## 2021-09-22 LAB — ALBUMIN UR-MCNC: 14.5 MG/DL

## 2021-09-30 ENCOUNTER — OFFICE VISIT (OUTPATIENT)
Dept: UROLOGY | Facility: CLINIC | Age: 66
End: 2021-09-30

## 2021-09-30 VITALS
DIASTOLIC BLOOD PRESSURE: 71 MMHG | BODY MASS INDEX: 27.49 KG/M2 | TEMPERATURE: 97.1 F | HEART RATE: 98 BPM | OXYGEN SATURATION: 93 % | HEIGHT: 70 IN | WEIGHT: 192 LBS | SYSTOLIC BLOOD PRESSURE: 131 MMHG

## 2021-09-30 DIAGNOSIS — R33.8 BENIGN PROSTATIC HYPERPLASIA WITH URINARY RETENTION: Primary | ICD-10-CM

## 2021-09-30 DIAGNOSIS — N40.1 BENIGN PROSTATIC HYPERPLASIA WITH URINARY RETENTION: Primary | ICD-10-CM

## 2021-09-30 LAB
BILIRUB BLD-MCNC: ABNORMAL MG/DL
CLARITY, POC: CLEAR
COLOR UR: ABNORMAL
GLUCOSE UR STRIP-MCNC: NEGATIVE MG/DL
KETONES UR QL: ABNORMAL
LEUKOCYTE EST, POC: ABNORMAL
NITRITE UR-MCNC: NEGATIVE MG/ML
PH UR: 5.5 [PH] (ref 5–8)
PROT UR STRIP-MCNC: ABNORMAL MG/DL
RBC # UR STRIP: NEGATIVE /UL
SP GR UR: 1.02 (ref 1–1.03)
UROBILINOGEN UR QL: NORMAL

## 2021-09-30 PROCEDURE — 99204 OFFICE O/P NEW MOD 45 MIN: CPT | Performed by: UROLOGY

## 2021-09-30 PROCEDURE — 81003 URINALYSIS AUTO W/O SCOPE: CPT | Performed by: UROLOGY

## 2021-09-30 PROCEDURE — 51798 US URINE CAPACITY MEASURE: CPT | Performed by: UROLOGY

## 2021-09-30 NOTE — PROGRESS NOTES
Chief Complaint  LUTS    Referring Provider  Meghan Watt APRN HPI  Mr. Potter is a 66 y.o. male with history of CVA who presents with LUTS.  Primary symptom includes: Weak stream, straining, intermittency  Patient denies nocturia  Onset was many years ago, prior to his stroke.  Recently he has doubled the dosage of his tamsulosin that he has been on many years and has not seen benefit.  He has however been lightheaded, especially when standing..      IPSS Questionnaire (AUA-7):  Over the past month…    1)  Incomplete Emptying  How often have you had a sensation of not emptying your bladder?  2 - Less than half the time   2)  Frequency  How often have you had to urinate less than every two hours? 1 - Less than 1 time in 5   3)  Intermittency  How often have you found you stopped and started again several times when you urinated?  3 - About half the time   4) Urgency  How often have you found it difficult to postpone urination?  0 - Not at all   5) Weak Stream  How often have you had a weak urinary stream?  3 - About half the time   6) Straining  How often have you had to push or strain to begin urination?  3 - About half the time   7) Nocturia  How many times did you typically get up at night to urinate?  0 - None   Total Score:  12       Quality of life due to urinary symptoms:  If you were to spend the rest of your life with your urinary condition the way it is now, how would you feel about that? 5-Unhappy   Urine Leakage (Incontinence) 1-Mild (A few drops a day, no pad use)       Past Medical History  Past Medical History:   Diagnosis Date   • BPH (benign prostatic hyperplasia)    • Diabetes mellitus (CMS/[facility])    • Hyperlipidemia    • Hypertension    • Stroke (CMS/[facility])        Past Surgical History  Past Surgical History:   Procedure Laterality Date   • CARDIAC CATHETERIZATION N/A 9/9/2020    Procedure: Left Heart Cath 89075;  Surgeon: Bang Walker MD;  Location: [facility] INVASIVE LOCATION;   Service: Cardiology;  Laterality: N/A;   • COLONOSCOPY     • HAND SURGERY     • HIP BIPOLAR REPLACEMENT     • INTERVENTIONAL RADIOLOGY PROCEDURE N/A 6/29/2021    Procedure: IR MECHANICAL THROMBECTOMY - PRIMARY;  Surgeon: Dru Gomes MD;  Location: [facility] INVASIVE LOCATION;  Service: Interventional Radiology;  Laterality: N/A;       Medications    Current Outpatient Medications:   •  aspirin 81 MG EC tablet, Take 1 tablet by mouth Daily., Disp: 90 tablet, Rfl: 0  •  escitalopram (Lexapro) 5 MG tablet, Take 1 tablet by mouth Daily., Disp: 30 tablet, Rfl: 0  •  lisinopril (PRINIVIL,ZESTRIL) 10 MG tablet, Take 10 mg by mouth Daily., Disp: , Rfl:   •  metFORMIN (GLUCOPHAGE) 1000 MG tablet, Take 1,000 mg by mouth 2 (Two) Times a Day With Meals., Disp: , Rfl:   •  Niacin (VITAMIN B-3 PO), Take 1 tablet by mouth Daily., Disp: , Rfl:   •  rivaroxaban (Xarelto) 10 MG tablet, Take 10 mg by mouth Daily., Disp: , Rfl:   •  rosuvastatin (Crestor) 40 MG tablet, Take 1 tablet by mouth Daily., Disp: 30 tablet, Rfl: 0  •  tamsulosin (FLOMAX) 0.4 MG capsule 24 hr capsule, 1 capsule Daily., Disp: , Rfl:     Allergies  No Known Allergies    Social History  Social History     Socioeconomic History   • Marital status: Single     Spouse name: Not on file   • Number of children: Not on file   • Years of education: Not on file   • Highest education level: Not on file   Tobacco Use   • Smoking status: Former Smoker     Types: Cigarettes   • Smokeless tobacco: Never Used   • Tobacco comment: quit 30 years ago   Vaping Use   • Vaping Use: Never used   Substance and Sexual Activity   • Alcohol use: Yes     Comment: once a month   • Drug use: Never   • Sexual activity: Defer       Family History  He has no family history of prostate cancer  Family History   Problem Relation Age of Onset   • Diabetes Mother        Review of Systems  A review of systems was notable for history of CVA and on blood thinner.    Physical Exam  Visit  "Vitals  /71   Pulse 98   Temp 97.1 °F (36.2 °C)   Ht 177.8 cm (70\")   Wt 92.1 kg (203 lb)   SpO2 93%   BMI 29.13 kg/m²     Physical exam notable for normal habitus.    Labs  No results found for: PSA    Brief Urine Lab Results  (Last result in the past 365 days)      Color   Clarity   Blood   Leuk Est   Nitrite   Protein   CREAT   Urine HCG        06/30/21 1826 Yellow Clear Negative Trace Negative Negative               PVR  Post-void residual performed by staff - 0mL      Assessment  Mr. Potter is a 66 y.o. male who presents with LUTS, primarily weak stream, straining, dribbling.    Plan  1.  Follow up for cystoscopy, TRUS, Uroflow, TABITHA, GE. Risks are blood thinners  2.  Reduce tamsulosin to QD; try stopping for a few days  3.  PSA today      Naga Rosenberg MD    "

## 2021-10-01 ENCOUNTER — LAB (OUTPATIENT)
Dept: LAB | Facility: HOSPITAL | Age: 66
End: 2021-10-01

## 2021-10-01 DIAGNOSIS — Z12.5 ENCOUNTER FOR SCREENING FOR MALIGNANT NEOPLASM OF PROSTATE: ICD-10-CM

## 2021-10-01 DIAGNOSIS — E11.9 TYPE 2 DIABETES MELLITUS WITHOUT COMPLICATION, WITHOUT LONG-TERM CURRENT USE OF INSULIN (HCC): ICD-10-CM

## 2021-10-01 DIAGNOSIS — R39.14 BENIGN PROSTATIC HYPERPLASIA WITH INCOMPLETE BLADDER EMPTYING: ICD-10-CM

## 2021-10-01 DIAGNOSIS — N40.1 BENIGN PROSTATIC HYPERPLASIA WITH INCOMPLETE BLADDER EMPTYING: ICD-10-CM

## 2021-10-01 LAB
ALBUMIN SERPL-MCNC: 4.4 G/DL (ref 3.5–5.2)
ALBUMIN/GLOB SERPL: 1.6 G/DL
ALP SERPL-CCNC: 109 U/L (ref 39–117)
ALT SERPL W P-5'-P-CCNC: 37 U/L (ref 1–41)
ANION GAP SERPL CALCULATED.3IONS-SCNC: 13.1 MMOL/L (ref 5–15)
AST SERPL-CCNC: 38 U/L (ref 1–40)
BASOPHILS # BLD AUTO: 0.06 10*3/MM3 (ref 0–0.2)
BASOPHILS NFR BLD AUTO: 0.6 % (ref 0–1.5)
BILIRUB SERPL-MCNC: 0.4 MG/DL (ref 0–1.2)
BUN SERPL-MCNC: 40 MG/DL (ref 8–23)
BUN/CREAT SERPL: 20.3 (ref 7–25)
CALCIUM SPEC-SCNC: 10 MG/DL (ref 8.6–10.5)
CHLORIDE SERPL-SCNC: 103 MMOL/L (ref 98–107)
CHOLEST SERPL-MCNC: 122 MG/DL (ref 0–200)
CO2 SERPL-SCNC: 20.9 MMOL/L (ref 22–29)
CREAT SERPL-MCNC: 1.97 MG/DL (ref 0.76–1.27)
DEPRECATED RDW RBC AUTO: 43.1 FL (ref 37–54)
EOSINOPHIL # BLD AUTO: 0.18 10*3/MM3 (ref 0–0.4)
EOSINOPHIL NFR BLD AUTO: 1.7 % (ref 0.3–6.2)
ERYTHROCYTE [DISTWIDTH] IN BLOOD BY AUTOMATED COUNT: 12.2 % (ref 12.3–15.4)
GFR SERPL CREATININE-BSD FRML MDRD: 34 ML/MIN/1.73
GLOBULIN UR ELPH-MCNC: 2.8 GM/DL
GLUCOSE SERPL-MCNC: 94 MG/DL (ref 65–99)
HBA1C MFR BLD: 6.1 % (ref 4.8–5.6)
HCT VFR BLD AUTO: 34.4 % (ref 37.5–51)
HDLC SERPL-MCNC: 31 MG/DL (ref 40–60)
HGB BLD-MCNC: 11.4 G/DL (ref 13–17.7)
IMM GRANULOCYTES # BLD AUTO: 0.03 10*3/MM3 (ref 0–0.05)
IMM GRANULOCYTES NFR BLD AUTO: 0.3 % (ref 0–0.5)
LDLC SERPL CALC-MCNC: 64 MG/DL (ref 0–100)
LDLC/HDLC SERPL: 1.92 {RATIO}
LYMPHOCYTES # BLD AUTO: 5.35 10*3/MM3 (ref 0.7–3.1)
LYMPHOCYTES NFR BLD AUTO: 50.3 % (ref 19.6–45.3)
MCH RBC QN AUTO: 32 PG (ref 26.6–33)
MCHC RBC AUTO-ENTMCNC: 33.1 G/DL (ref 31.5–35.7)
MCV RBC AUTO: 96.6 FL (ref 79–97)
MONOCYTES # BLD AUTO: 0.76 10*3/MM3 (ref 0.1–0.9)
MONOCYTES NFR BLD AUTO: 7.1 % (ref 5–12)
NEUTROPHILS NFR BLD AUTO: 4.25 10*3/MM3 (ref 1.7–7)
NEUTROPHILS NFR BLD AUTO: 40 % (ref 42.7–76)
NRBC BLD AUTO-RTO: 0 /100 WBC (ref 0–0.2)
PLATELET # BLD AUTO: 261 10*3/MM3 (ref 140–450)
PMV BLD AUTO: 10.5 FL (ref 6–12)
POTASSIUM SERPL-SCNC: 5.1 MMOL/L (ref 3.5–5.2)
PROT SERPL-MCNC: 7.2 G/DL (ref 6–8.5)
PSA SERPL-MCNC: 3.16 NG/ML (ref 0–4)
PSA SERPL-MCNC: 3.23 NG/ML (ref 0–4)
RBC # BLD AUTO: 3.56 10*6/MM3 (ref 4.14–5.8)
SODIUM SERPL-SCNC: 137 MMOL/L (ref 136–145)
TRIGL SERPL-MCNC: 158 MG/DL (ref 0–150)
VLDLC SERPL-MCNC: 27 MG/DL (ref 5–40)
WBC # BLD AUTO: 10.63 10*3/MM3 (ref 3.4–10.8)

## 2021-10-01 PROCEDURE — 80061 LIPID PANEL: CPT

## 2021-10-01 PROCEDURE — 85025 COMPLETE CBC W/AUTO DIFF WBC: CPT

## 2021-10-01 PROCEDURE — 83036 HEMOGLOBIN GLYCOSYLATED A1C: CPT

## 2021-10-01 PROCEDURE — G0103 PSA SCREENING: HCPCS

## 2021-10-01 PROCEDURE — 80053 COMPREHEN METABOLIC PANEL: CPT

## 2021-10-04 ENCOUNTER — HOSPITAL ENCOUNTER (OUTPATIENT)
Facility: HOSPITAL | Age: 66
Discharge: HOME | End: 2021-10-06
Attending: EMERGENCY MEDICINE | Admitting: INTERNAL MEDICINE

## 2021-10-04 ENCOUNTER — OFFICE VISIT (OUTPATIENT)
Dept: FAMILY MEDICINE CLINIC | Facility: CLINIC | Age: 66
End: 2021-10-04

## 2021-10-04 ENCOUNTER — OFFICE VISIT (OUTPATIENT)
Dept: NEUROSURGERY | Facility: CLINIC | Age: 66
End: 2021-10-04

## 2021-10-04 ENCOUNTER — APPOINTMENT (OUTPATIENT)
Dept: GENERAL RADIOLOGY | Facility: HOSPITAL | Age: 66
End: 2021-10-04

## 2021-10-04 VITALS
DIASTOLIC BLOOD PRESSURE: 48 MMHG | TEMPERATURE: 97.1 F | SYSTOLIC BLOOD PRESSURE: 92 MMHG | HEIGHT: 70 IN | WEIGHT: 197 LBS | BODY MASS INDEX: 28.2 KG/M2

## 2021-10-04 VITALS
DIASTOLIC BLOOD PRESSURE: 40 MMHG | HEART RATE: 90 BPM | SYSTOLIC BLOOD PRESSURE: 60 MMHG | OXYGEN SATURATION: 94 % | HEIGHT: 70 IN | BODY MASS INDEX: 28.12 KG/M2 | WEIGHT: 196.4 LBS

## 2021-10-04 DIAGNOSIS — Z86.73 HISTORY OF RECENT STROKE: ICD-10-CM

## 2021-10-04 DIAGNOSIS — I95.9 HYPOTENSION, UNSPECIFIED HYPOTENSION TYPE: ICD-10-CM

## 2021-10-04 DIAGNOSIS — I95.1 ORTHOSTATIC HYPOTENSION: ICD-10-CM

## 2021-10-04 DIAGNOSIS — I63.511 ACUTE ISCHEMIC RIGHT MIDDLE CEREBRAL ARTERY (MCA) STROKE (HCC): Primary | ICD-10-CM

## 2021-10-04 DIAGNOSIS — N17.9 AKI (ACUTE KIDNEY INJURY) (HCC): ICD-10-CM

## 2021-10-04 DIAGNOSIS — R11.2 NAUSEA AND VOMITING, INTRACTABILITY OF VOMITING NOT SPECIFIED, UNSPECIFIED VOMITING TYPE: ICD-10-CM

## 2021-10-04 DIAGNOSIS — D64.9 ANEMIA, UNSPECIFIED TYPE: ICD-10-CM

## 2021-10-04 DIAGNOSIS — K22.89 ESOPHAGEAL MASS: ICD-10-CM

## 2021-10-04 DIAGNOSIS — R53.1 WEAKNESS: ICD-10-CM

## 2021-10-04 DIAGNOSIS — R63.4 WEIGHT LOSS: ICD-10-CM

## 2021-10-04 DIAGNOSIS — E11.29 TYPE 2 DIABETES MELLITUS WITH OTHER DIABETIC KIDNEY COMPLICATION, WITHOUT LONG-TERM CURRENT USE OF INSULIN (HCC): ICD-10-CM

## 2021-10-04 DIAGNOSIS — N17.9 AKI (ACUTE KIDNEY INJURY) (HCC): Primary | ICD-10-CM

## 2021-10-04 DIAGNOSIS — R53.1 GENERALIZED WEAKNESS: ICD-10-CM

## 2021-10-04 DIAGNOSIS — R42 LIGHTHEADEDNESS: Primary | ICD-10-CM

## 2021-10-04 LAB
ALBUMIN SERPL-MCNC: 4.3 G/DL (ref 3.5–5.2)
ALBUMIN/GLOB SERPL: 1.5 G/DL
ALP SERPL-CCNC: 125 U/L (ref 39–117)
ALT SERPL W P-5'-P-CCNC: 55 U/L (ref 1–41)
ANION GAP SERPL CALCULATED.3IONS-SCNC: 14 MMOL/L (ref 5–15)
AST SERPL-CCNC: 49 U/L (ref 1–40)
BACTERIA UR QL AUTO: ABNORMAL /HPF
BASOPHILS # BLD AUTO: 0.06 10*3/MM3 (ref 0–0.2)
BASOPHILS NFR BLD AUTO: 0.6 % (ref 0–1.5)
BILIRUB SERPL-MCNC: 0.3 MG/DL (ref 0–1.2)
BILIRUB UR QL STRIP: ABNORMAL
BUN SERPL-MCNC: 38 MG/DL (ref 8–23)
BUN/CREAT SERPL: 19.8 (ref 7–25)
CALCIUM SPEC-SCNC: 9.8 MG/DL (ref 8.6–10.5)
CHLORIDE SERPL-SCNC: 104 MMOL/L (ref 98–107)
CLARITY UR: CLEAR
CO2 SERPL-SCNC: 19 MMOL/L (ref 22–29)
COLOR UR: YELLOW
CREAT SERPL-MCNC: 1.92 MG/DL (ref 0.76–1.27)
DEPRECATED RDW RBC AUTO: 40.8 FL (ref 37–54)
EOSINOPHIL # BLD AUTO: 0.15 10*3/MM3 (ref 0–0.4)
EOSINOPHIL NFR BLD AUTO: 1.5 % (ref 0.3–6.2)
ERYTHROCYTE [DISTWIDTH] IN BLOOD BY AUTOMATED COUNT: 11.9 % (ref 12.3–15.4)
FLUAV SUBTYP SPEC NAA+PROBE: NOT DETECTED
FLUBV RNA ISLT QL NAA+PROBE: NOT DETECTED
GFR SERPL CREATININE-BSD FRML MDRD: 35 ML/MIN/1.73
GLOBULIN UR ELPH-MCNC: 2.9 GM/DL
GLUCOSE BLDC GLUCOMTR-MCNC: 102 MG/DL (ref 70–130)
GLUCOSE BLDC GLUCOMTR-MCNC: 95 MG/DL (ref 70–130)
GLUCOSE SERPL-MCNC: 93 MG/DL (ref 65–99)
GLUCOSE UR STRIP-MCNC: NEGATIVE MG/DL
HAV IGM SERPL QL IA: NORMAL
HBV CORE IGM SERPL QL IA: NORMAL
HBV SURFACE AG SERPL QL IA: NORMAL
HCT VFR BLD AUTO: 32.7 % (ref 37.5–51)
HCV AB SER DONR QL: NORMAL
HGB BLD-MCNC: 11.1 G/DL (ref 13–17.7)
HGB UR QL STRIP.AUTO: NEGATIVE
HOLD SPECIMEN: NORMAL
HYALINE CASTS UR QL AUTO: ABNORMAL /LPF
IMM GRANULOCYTES # BLD AUTO: 0.03 10*3/MM3 (ref 0–0.05)
IMM GRANULOCYTES NFR BLD AUTO: 0.3 % (ref 0–0.5)
KETONES UR QL STRIP: ABNORMAL
LEUKOCYTE ESTERASE UR QL STRIP.AUTO: ABNORMAL
LYMPHOCYTES # BLD AUTO: 4.62 10*3/MM3 (ref 0.7–3.1)
LYMPHOCYTES NFR BLD AUTO: 45.7 % (ref 19.6–45.3)
MAGNESIUM SERPL-MCNC: 1.7 MG/DL (ref 1.6–2.4)
MCH RBC QN AUTO: 31.9 PG (ref 26.6–33)
MCHC RBC AUTO-ENTMCNC: 33.9 G/DL (ref 31.5–35.7)
MCV RBC AUTO: 94 FL (ref 79–97)
MONOCYTES # BLD AUTO: 0.77 10*3/MM3 (ref 0.1–0.9)
MONOCYTES NFR BLD AUTO: 7.6 % (ref 5–12)
NEUTROPHILS NFR BLD AUTO: 4.47 10*3/MM3 (ref 1.7–7)
NEUTROPHILS NFR BLD AUTO: 44.3 % (ref 42.7–76)
NITRITE UR QL STRIP: NEGATIVE
NRBC BLD AUTO-RTO: 0 /100 WBC (ref 0–0.2)
PH UR STRIP.AUTO: 6 [PH] (ref 5–8)
PLATELET # BLD AUTO: 235 10*3/MM3 (ref 140–450)
PMV BLD AUTO: 9.8 FL (ref 6–12)
POTASSIUM SERPL-SCNC: 5.1 MMOL/L (ref 3.5–5.2)
PROT SERPL-MCNC: 7.2 G/DL (ref 6–8.5)
PROT UR QL STRIP: ABNORMAL
RBC # BLD AUTO: 3.48 10*6/MM3 (ref 4.14–5.8)
RBC # UR: ABNORMAL /HPF
REF LAB TEST METHOD: ABNORMAL
SARS-COV-2 RNA PNL SPEC NAA+PROBE: NOT DETECTED
SODIUM SERPL-SCNC: 137 MMOL/L (ref 136–145)
SP GR UR STRIP: 1.02 (ref 1–1.03)
SQUAMOUS #/AREA URNS HPF: ABNORMAL /HPF
TROPONIN T SERPL-MCNC: 0.01 NG/ML (ref 0–0.03)
UROBILINOGEN UR QL STRIP: ABNORMAL
WBC # BLD AUTO: 10.1 10*3/MM3 (ref 3.4–10.8)
WBC UR QL AUTO: ABNORMAL /HPF
WHOLE BLOOD HOLD SPECIMEN: NORMAL
WHOLE BLOOD HOLD SPECIMEN: NORMAL

## 2021-10-04 PROCEDURE — 71045 X-RAY EXAM CHEST 1 VIEW: CPT

## 2021-10-04 PROCEDURE — 83735 ASSAY OF MAGNESIUM: CPT | Performed by: EMERGENCY MEDICINE

## 2021-10-04 PROCEDURE — 84484 ASSAY OF TROPONIN QUANT: CPT | Performed by: EMERGENCY MEDICINE

## 2021-10-04 PROCEDURE — 99284 EMERGENCY DEPT VISIT MOD MDM: CPT

## 2021-10-04 PROCEDURE — 80074 ACUTE HEPATITIS PANEL: CPT | Performed by: INTERNAL MEDICINE

## 2021-10-04 PROCEDURE — G0378 HOSPITAL OBSERVATION PER HR: HCPCS

## 2021-10-04 PROCEDURE — 81001 URINALYSIS AUTO W/SCOPE: CPT | Performed by: EMERGENCY MEDICINE

## 2021-10-04 PROCEDURE — 99220 PR INITIAL OBSERVATION CARE/DAY 70 MINUTES: CPT | Performed by: INTERNAL MEDICINE

## 2021-10-04 PROCEDURE — 85025 COMPLETE CBC W/AUTO DIFF WBC: CPT | Performed by: EMERGENCY MEDICINE

## 2021-10-04 PROCEDURE — C9803 HOPD COVID-19 SPEC COLLECT: HCPCS

## 2021-10-04 PROCEDURE — 87636 SARSCOV2 & INF A&B AMP PRB: CPT | Performed by: EMERGENCY MEDICINE

## 2021-10-04 PROCEDURE — 82962 GLUCOSE BLOOD TEST: CPT

## 2021-10-04 PROCEDURE — 93005 ELECTROCARDIOGRAM TRACING: CPT | Performed by: EMERGENCY MEDICINE

## 2021-10-04 PROCEDURE — 99213 OFFICE O/P EST LOW 20 MIN: CPT | Performed by: PHYSICIAN ASSISTANT

## 2021-10-04 PROCEDURE — 80053 COMPREHEN METABOLIC PANEL: CPT | Performed by: EMERGENCY MEDICINE

## 2021-10-04 PROCEDURE — 99214 OFFICE O/P EST MOD 30 MIN: CPT | Performed by: PHYSICIAN ASSISTANT

## 2021-10-04 RX ORDER — ESCITALOPRAM OXALATE 10 MG/1
5 TABLET ORAL DAILY
Status: DISCONTINUED | OUTPATIENT
Start: 2021-10-05 | End: 2021-10-06 | Stop reason: HOSPADM

## 2021-10-04 RX ORDER — ROSUVASTATIN CALCIUM 20 MG/1
40 TABLET, COATED ORAL DAILY
Status: DISCONTINUED | OUTPATIENT
Start: 2021-10-05 | End: 2021-10-06 | Stop reason: HOSPADM

## 2021-10-04 RX ORDER — DEXTROSE MONOHYDRATE 25 G/50ML
25 INJECTION, SOLUTION INTRAVENOUS
Status: DISCONTINUED | OUTPATIENT
Start: 2021-10-04 | End: 2021-10-06 | Stop reason: HOSPADM

## 2021-10-04 RX ORDER — NICOTINE POLACRILEX 4 MG
15 LOZENGE BUCCAL
Status: DISCONTINUED | OUTPATIENT
Start: 2021-10-04 | End: 2021-10-06 | Stop reason: HOSPADM

## 2021-10-04 RX ORDER — DIPHENHYDRAMINE HCL 25 MG
25 CAPSULE ORAL NIGHTLY PRN
Status: DISCONTINUED | OUTPATIENT
Start: 2021-10-04 | End: 2021-10-06 | Stop reason: HOSPADM

## 2021-10-04 RX ORDER — SODIUM CHLORIDE 0.9 % (FLUSH) 0.9 %
10 SYRINGE (ML) INJECTION EVERY 12 HOURS SCHEDULED
Status: DISCONTINUED | OUTPATIENT
Start: 2021-10-04 | End: 2021-10-06 | Stop reason: HOSPADM

## 2021-10-04 RX ORDER — SODIUM CHLORIDE 0.9 % (FLUSH) 0.9 %
10 SYRINGE (ML) INJECTION AS NEEDED
Status: DISCONTINUED | OUTPATIENT
Start: 2021-10-04 | End: 2021-10-06 | Stop reason: HOSPADM

## 2021-10-04 RX ORDER — BLOOD-GLUCOSE METER
KIT MISCELLANEOUS
Qty: 1 EACH | Refills: 0 | Status: SHIPPED | OUTPATIENT
Start: 2021-10-04

## 2021-10-04 RX ORDER — ONDANSETRON 2 MG/ML
4 INJECTION INTRAMUSCULAR; INTRAVENOUS EVERY 6 HOURS PRN
Status: DISCONTINUED | OUTPATIENT
Start: 2021-10-04 | End: 2021-10-06 | Stop reason: HOSPADM

## 2021-10-04 RX ORDER — SODIUM CHLORIDE 0.9 % (FLUSH) 0.9 %
10 SYRINGE (ML) INJECTION AS NEEDED
Status: DISCONTINUED | OUTPATIENT
Start: 2021-10-04 | End: 2021-10-05 | Stop reason: SDUPTHER

## 2021-10-04 RX ORDER — SODIUM CHLORIDE 9 MG/ML
100 INJECTION, SOLUTION INTRAVENOUS CONTINUOUS
Status: DISCONTINUED | OUTPATIENT
Start: 2021-10-04 | End: 2021-10-06

## 2021-10-04 RX ORDER — LANCETS
EACH MISCELLANEOUS
Qty: 100 EACH | Refills: 3 | Status: SHIPPED | OUTPATIENT
Start: 2021-10-04

## 2021-10-04 RX ADMIN — SODIUM CHLORIDE, PRESERVATIVE FREE 10 ML: 5 INJECTION INTRAVENOUS at 22:00

## 2021-10-04 RX ADMIN — NYSTATIN 500000 UNITS: 100000 SUSPENSION ORAL at 22:04

## 2021-10-04 RX ADMIN — SODIUM CHLORIDE 1000 ML: 9 INJECTION, SOLUTION INTRAVENOUS at 16:27

## 2021-10-04 RX ADMIN — SODIUM CHLORIDE 100 ML/HR: 9 INJECTION, SOLUTION INTRAVENOUS at 22:00

## 2021-10-04 NOTE — H&P
[facility] Medicine Services  HISTORY AND PHYSICAL    Patient Name: Andry Potter  : 1955  MRN: [id number]  Primary Care Physician: Meghan Watt APRN  Date of admission: 10/4/2021    Subjective   Subjective     Chief Complaint:  Weakness    HPI:  Andry Potter is a 66 y.o. male with past medical history of CVA (right MCA occlusion  status post thrombectomy), atrial fibrillation, hypertension, hyperlipidemia, BPH  who presents to [facility] today for weakness.  Patient was evaluated by neurosurgery today and then had a primary care doctor's appointment.  He was reportedly hypotensive and the primary care appointment and several of his blood pressure medications and Metformin were stopped.  He was started on Januvia but has not yet taken this medication.  He reports generalized weakness for the past 2 to 3 months and a 40 pound weight loss.    He also reports he has had intermittent nausea and vomiting throughout the week and has at least 2-3 episodes of vomiting per week.  He states he has little food and water intake.  Any other foods after breakfast make him nauseous and want to throw up    He does drink Ensure.  Denies abdominal pain, melena, hematochezia, dysuria, hematuria, difficulty urinating.  Further denies chest pain, shortness of breath, back pain.  Denies headache or lightheadedness.      COVID Details:     Symptoms:    [] NONE [] Fever []  Cough [x] Shortness of breath [] Change in taste/smell      The patient has a COVID HM Topic on their chart, and they are fully vaccinated.      Review of Systems   Constitutional: Positive for activity change, appetite change and fatigue. Negative for chills.   HENT: Negative for ear discharge, ear pain, nosebleeds and rhinorrhea.    Eyes: Negative for photophobia.   Respiratory: Negative for cough, chest tightness and shortness of breath.    Cardiovascular: Negative for chest pain, palpitations and leg swelling.    Gastrointestinal: Negative for abdominal pain, diarrhea, nausea and vomiting.   Genitourinary: Negative for discharge, flank pain, hematuria, penile pain and penile swelling.   Musculoskeletal: Negative for back pain.   Neurological: Positive for weakness. Negative for dizziness, seizures, facial asymmetry, speech difficulty, light-headedness, numbness and headaches.   All other systems reviewed and are negative.      Personal History     Past Medical History:   Diagnosis Date   • BPH (benign prostatic hyperplasia)    • Diabetes mellitus (HCC)    • Hyperlipidemia    • Hypertension    • Stroke (HCC)        Past Surgical History:   Procedure Laterality Date   • CARDIAC CATHETERIZATION N/A 9/9/2020    Procedure: Left Heart Cath 24349;  Surgeon: Bang Walker MD;  Location:  [facility] INVASIVE LOCATION;  Service: Cardiology;  Laterality: N/A;   • COLONOSCOPY     • HAND SURGERY     • HIP BIPOLAR REPLACEMENT     • INTERVENTIONAL RADIOLOGY PROCEDURE N/A 6/29/2021    Procedure: IR MECHANICAL THROMBECTOMY - PRIMARY;  Surgeon: Dru Gomes MD;  Location:  [facility] INVASIVE LOCATION;  Service: Interventional Radiology;  Laterality: N/A;       Family History: family history includes Diabetes in his mother. Otherwise pertinent FHx was reviewed and unremarkable.     Social History:  reports that he has quit smoking. His smoking use included cigarettes. He has never used smokeless tobacco. He reports current alcohol use. He reports that he does not use drugs.  Social History     Social History Narrative   • Not on file       Medications:  Niacin, SITagliptin, aspirin, escitalopram, glucose blood, glucose monitor, onetouch ultrasoft, rivaroxaban, and rosuvastatin    No Known Allergies    Objective   Objective     Vital Signs:   Temp:  [97.1 °F (36.2 °C)-97.9 °F (36.6 °C)] 97.6 °F (36.4 °C)  Heart Rate:  [] 68  Resp:  [16] 16  BP: ()/(40-88) 122/88    Physical Exam  Vitals and nursing note reviewed.    Constitutional:       Appearance: Normal appearance. He is not ill-appearing.   HENT:      Head: Normocephalic and atraumatic.      Right Ear: External ear normal.      Left Ear: External ear normal.      Nose: No congestion.   Cardiovascular:      Rate and Rhythm: Normal rate and regular rhythm.      Pulses: Normal pulses.   Pulmonary:      Effort: Pulmonary effort is normal.      Breath sounds: No wheezing.   Abdominal:      General: Abdomen is flat.      Tenderness: There is no abdominal tenderness.      Hernia: No hernia is present.   Skin:     Coloration: Skin is not jaundiced.   Neurological:      General: No focal deficit present.      Mental Status: He is alert and oriented to person, place, and time.      Comments: 5/5 strength in bilateral upper and lower extremities.  Normal speech.   Psychiatric:         Mood and Affect: Mood normal.         Thought Content: Thought content normal.        + oral thrush noted    Results Reviewed:  I have personally reviewed most recent indicated data and agree with findings including:  [x]  Laboratory  [x]  Radiology  []  EKG/Telemetry  []  Pathology  []  Cardiac/Vascular Studies  []  Old records  []  Other:    Most pertinent findings include:    LAB RESULTS:      Lab 10/04/21  1526 10/01/21  0718   WBC 10.10 10.63   HEMOGLOBIN 11.1* 11.4*   HEMATOCRIT 32.7* 34.4*   PLATELETS 235 261   NEUTROS ABS 4.47 4.25   IMMATURE GRANS (ABS) 0.03 0.03   LYMPHS ABS 4.62* 5.35*   MONOS ABS 0.77 0.76   EOS ABS 0.15 0.18   MCV 94.0 96.6         Lab 10/04/21  1526 10/01/21  0718   SODIUM 137 137   POTASSIUM 5.1 5.1   CHLORIDE 104 103   CO2 19.0* 20.9*   ANION GAP 14.0 13.1   BUN 38* 40*   CREATININE 1.92* 1.97*   GLUCOSE 93 94   CALCIUM 9.8 10.0   MAGNESIUM 1.7  --    HEMOGLOBIN A1C  --  6.10*         Lab 10/04/21  1526 10/01/21  0718   TOTAL PROTEIN 7.2 7.2   ALBUMIN 4.30 4.40   GLOBULIN 2.9 2.8   ALT (SGPT) 55* 37   AST (SGOT) 49* 38   BILIRUBIN 0.3 0.4   ALK PHOS 125* 109         Lab  10/04/21  1526   TROPONIN T 0.011         Lab 10/01/21  0718   CHOLESTEROL 122   LDL CHOL 64   HDL CHOL 31*   TRIGLYCERIDES 158*             Brief Urine Lab Results  (Last result in the past 365 days)      Color   Clarity   Blood   Leuk Est   Nitrite   Protein   CREAT   Urine HCG        10/04/21 1617 Yellow Clear Negative Small (1+) Negative 100 mg/dL (2+)             Microbiology Results (last 10 days)     Procedure Component Value - Date/Time    COVID PRE-OP / PRE-PROCEDURE SCREENING ORDER (NO ISOLATION) - Swab, Nasopharynx [[id number]]  (Normal) Collected: 10/04/21 1715    Lab Status: Final result Specimen: Swab from Nasopharynx Updated: 10/04/21 1751    Narrative:      The following orders were created for panel order COVID PRE-OP / PRE-PROCEDURE SCREENING ORDER (NO ISOLATION) - Swab, Nasopharynx.  Procedure                               Abnormality         Status                     ---------                               -----------         ------                     COVID-19 and FLU A/B PCR...[[id number]]  Normal              Final result                 Please view results for these tests on the individual orders.    COVID-19 and FLU A/B PCR - Swab, Nasopharynx [[id number]]  (Normal) Collected: 10/04/21 1715    Lab Status: Final result Specimen: Swab from Nasopharynx Updated: 10/04/21 1751     COVID19 Not Detected     Influenza A PCR Not Detected     Influenza B PCR Not Detected    Narrative:      Fact sheet for providers: https://www.fda.gov/media/444328/download    Fact sheet for patients: https://www.fda.gov/media/933995/download    Test performed by PCR.          XR Chest 1 View    Result Date: 10/4/2021  EXAMINATION: XR CHEST 1 VW-  INDICATION: Weak/Dizzy/AMS triage protocol  COMPARISON: 6/29/2021  FINDINGS: Heart mediastinum and pulmonary vasculature appear within normal limits. Mild coarsening of the pulmonary interstitial markings appears stable from prior exam, apparently chronic. No new pulmonary  parenchymal disease evidence of edema effusion or pneumothorax is seen.       Impression: No new chest disease.              Assessment/Plan   Assessment & Plan       Weakness    Weight loss    Andry Potter is a 66 y.o. male with past medical history of CVA (right MCA occlusion 6/21 status post thrombectomy), atrial fibrillation, hypertension, hyperlipidemia, BPH  who presents to [facility] today for weakness.  He went to PCP's office and was hypotensive.    Hypotension  -resolved  -fluids  -home hypertensive medications stopped      Nausea/Vomiting  -zofran  -CT abdomen/pelvis pending    Atrial fibrillation  -continue Xarelto    HLD  -Rousvastatin    DM  -PCP stopped metformin today  -40 lb weight loss  - SSI    ATTENDING:    Oral thrush  - add nystatin swish and swallow  - GI eval for possible EGD  - hold xarelto pending GI consult    Significant lymphocytopenia noted  Patient admits to significant life stressors with recent stroke and death of his dad in the month after...  Check hiv  Check hepatitis panel    Check AM cortisol  Check TSH    CT abd/pelvis pending  Check am iron stores  Nutrition consult    BPH  - flomax held secondary to hypotension  - was seen by urology and had negative bladder scan after it was stopped  - monitor for now    DVT prophylaxis:  xarelto    CODE STATUS:    Level Of Support Discussed With: Patient  Code Status: CPR  Medical Interventions (Level of Support Prior to Arrest): Full      This note has been completed as part of a split-shared workflow.     Signature: Electronically signed by Carmen Benoit PA-C, 10/04/21, 5:49 PM EDT        Attending   Admission Attestation       I have seen and examined the patient, performing an independent face-to-face diagnostic evaluation with plan of care reviewed and developed with the advanced practice clinician (APC).      Brief Summary Statement:   Andry Potter is a 66 y.o. male with recent stroke, started on xarelto. He was seen by  neurosurgey and PA at PCP office and noted to be hypotensive, resolved with IVF. Admits to not being able to tolerate sugary foods, only thing he can usually eat is eggs in the morning. + weight loss in the last few months after having a stroke    Remainder of detailed HPI is as noted by APC and has been reviewed and/or edited by me for completeness.    Attending Physical Exam:  Constitutional: Awake, alert, frail  Eyes: PERRLA, sclerae anicteric, no conjunctival injection  HENT: NCAT, mucous membranes moist, + oral thrush  Neck: Supple, no thyromegaly, no lymphadenopathy, trachea midline  Respiratory: Clear to auscultation bilaterally, nonlabored respirations   Cardiovascular: RRR, no murmurs, rubs, or gallops, palpable pedal pulses bilaterally  Gastrointestinal: thin, soft, nontender, nondistended  Musculoskeletal: No bilateral ankle edema, no clubbing or cyanosis to extremities  Psychiatric: Appropriate affect, cooperative  Neurologic: Oriented x 3, speech clear  Skin: No rashes    Brief Assessment/Plan :  See detailed assessment and plan developed with APC which I have reviewed and/or edited for completeness.        Admission Status: I believe that this patient meets OBSERVATION status, however if further evaluation or treatment plans warrant, status may change.  Based upon current information, I predict patient's care encounter to be less than or equal to 2 midnights.        Kenzie Ibarra,   10/04/21

## 2021-10-04 NOTE — ED PROVIDER NOTES
Subjective   Patient is very pleasant 66-year-old male with a history of stroke 2 months ago for which she was admitted and had had TPA and stroke intervention.  Since that time has had some generalized weakness but for the past 4 weeks has had this more pronounced fatigue along with the persistent nausea and occasional vomiting.  This is been baseline for the past 1 month.  He states that he is still able to do his daily activities but when he walks he has to sit down frequently to regain his energy and strength.  He does get lightheaded relatively frequently.  He had follow-up with the stroke intervention team this morning was noted to be hypotensive.  Laboratory work from last week was also reviewed which noted an acute kidney injury with a creatinine just over 1.9.  Given the combination of suspect dehydration and acute kidney injury was transferred to his primary care provider who evaluated him and referred him to the emergency department for evaluation of his acute kidney injury.      He notes weight loss of 40 pounds over the past 1 year.  No known cancer or reason for the involuntary weight loss.      History provided by:  Patient  Fatigue  Location:  Generalized  Severity:  Moderate  Onset quality:  Gradual  Duration:  1 month  Timing:  Constant  Progression:  Worsening  Worsened by:  Exertion  Ineffective treatments:  None  Associated symptoms: fatigue, nausea and vomiting (NOT TODAY.  APPROX 3 TIMES PER WEEK)    Associated symptoms: no abdominal pain, no chest pain, no congestion, no cough, no diarrhea, no fever, no headaches, no loss of consciousness, no myalgias, no rash, no shortness of breath, no sore throat and no wheezing        Review of Systems   Constitutional: Positive for fatigue. Negative for fever.   HENT: Negative for congestion and sore throat.    Respiratory: Negative for cough, shortness of breath and wheezing.    Cardiovascular: Negative for chest pain.   Gastrointestinal: Positive for  nausea and vomiting (NOT TODAY.  APPROX 3 TIMES PER WEEK). Negative for abdominal pain and diarrhea.   Musculoskeletal: Negative for myalgias.   Skin: Negative for rash.   Neurological: Negative for loss of consciousness and headaches.   All other systems reviewed and are negative.      Past Medical History:   Diagnosis Date   • BPH (benign prostatic hyperplasia)    • Diabetes mellitus (HCC)    • Hyperlipidemia    • Hypertension    • Stroke (HCC)        No Known Allergies    Past Surgical History:   Procedure Laterality Date   • CARDIAC CATHETERIZATION N/A 9/9/2020    Procedure: Left Heart Cath 20167;  Surgeon: Bang Walker MD;  Location:  [facility];  Service: Cardiology;  Laterality: N/A;   • COLONOSCOPY     • HAND SURGERY     • HIP BIPOLAR REPLACEMENT     • INTERVENTIONAL RADIOLOGY PROCEDURE N/A 6/29/2021    Procedure: IR MECHANICAL THROMBECTOMY - PRIMARY;  Surgeon: Dru Gomes MD;  Location:  [facility];  Service: Interventional Radiology;  Laterality: N/A;       Family History   Problem Relation Age of Onset   • Diabetes Mother        Social History     Socioeconomic History   • Marital status: Single     Spouse name: Not on file   • Number of children: Not on file   • Years of education: Not on file   • Highest education level: Not on file   Tobacco Use   • Smoking status: Former Smoker     Types: Cigarettes   • Smokeless tobacco: Never Used   • Tobacco comment: quit 30 years ago   Vaping Use   • Vaping Use: Never used   Substance and Sexual Activity   • Alcohol use: Yes     Comment: once a month   • Drug use: Never   • Sexual activity: Defer           Objective   Physical Exam  Vitals and nursing note reviewed.   Constitutional:       General: He is not in acute distress.     Appearance: He is ill-appearing.   HENT:      Head: Normocephalic and atraumatic.   Eyes:      Conjunctiva/sclera: Conjunctivae normal.      Pupils: Pupils are equal, round, and reactive  to light.   Neck:      Thyroid: No thyromegaly.   Cardiovascular:      Rate and Rhythm: Normal rate and regular rhythm.      Heart sounds: Normal heart sounds. No murmur heard.   No friction rub. No gallop.    Pulmonary:      Effort: Pulmonary effort is normal. No respiratory distress.      Breath sounds: Normal breath sounds.   Abdominal:      General: Bowel sounds are normal.      Palpations: Abdomen is soft.      Tenderness: There is no abdominal tenderness.   Musculoskeletal:         General: Normal range of motion.      Cervical back: Normal range of motion and neck supple.   Lymphadenopathy:      Cervical: No cervical adenopathy.   Skin:     General: Skin is warm and dry.      Capillary Refill: Capillary refill takes 2 to 3 seconds.      Coloration: Skin is pale.   Neurological:      General: No focal deficit present.      Mental Status: He is alert and oriented to person, place, and time. Mental status is at baseline.   Psychiatric:         Mood and Affect: Mood normal.         Behavior: Behavior normal.         Thought Content: Thought content normal.         Procedures           ED Course      Recent Results (from the past 24 hour(s))   Comprehensive Metabolic Panel    Collection Time: 10/04/21  3:26 PM    Specimen: Blood   Result Value Ref Range    Glucose 93 65 - 99 mg/dL    BUN 38 (H) 8 - 23 mg/dL    Creatinine 1.92 (H) 0.76 - 1.27 mg/dL    Sodium 137 136 - 145 mmol/L    Potassium 5.1 3.5 - 5.2 mmol/L    Chloride 104 98 - 107 mmol/L    CO2 19.0 (L) 22.0 - 29.0 mmol/L    Calcium 9.8 8.6 - 10.5 mg/dL    Total Protein 7.2 6.0 - 8.5 g/dL    Albumin 4.30 3.50 - 5.20 g/dL    ALT (SGPT) 55 (H) 1 - 41 U/L    AST (SGOT) 49 (H) 1 - 40 U/L    Alkaline Phosphatase 125 (H) 39 - 117 U/L    Total Bilirubin 0.3 0.0 - 1.2 mg/dL    eGFR Non African Amer 35 (L) >60 mL/min/1.73    Globulin 2.9 gm/dL    A/G Ratio 1.5 g/dL    BUN/Creatinine Ratio 19.8 7.0 - 25.0    Anion Gap 14.0 5.0 - 15.0 mmol/L   Troponin    Collection  Time: 10/04/21  3:26 PM    Specimen: Blood   Result Value Ref Range    Troponin T 0.011 0.000 - 0.030 ng/mL   Magnesium    Collection Time: 10/04/21  3:26 PM    Specimen: Blood   Result Value Ref Range    Magnesium 1.7 1.6 - 2.4 mg/dL   Green Top (Gel)    Collection Time: 10/04/21  3:26 PM   Result Value Ref Range    Extra Tube Hold for add-ons.    Lavender Top    Collection Time: 10/04/21  3:26 PM   Result Value Ref Range    Extra Tube hold for add-on    Gold Top - SST    Collection Time: 10/04/21  3:26 PM   Result Value Ref Range    Extra Tube Hold for add-ons.    Light Blue Top    Collection Time: 10/04/21  3:26 PM   Result Value Ref Range    Extra Tube hold for add-on    CBC Auto Differential    Collection Time: 10/04/21  3:26 PM    Specimen: Blood   Result Value Ref Range    WBC 10.10 3.40 - 10.80 10*3/mm3    RBC 3.48 (L) 4.14 - 5.80 10*6/mm3    Hemoglobin 11.1 (L) 13.0 - 17.7 g/dL    Hematocrit 32.7 (L) 37.5 - 51.0 %    MCV 94.0 79.0 - 97.0 fL    MCH 31.9 26.6 - 33.0 pg    MCHC 33.9 31.5 - 35.7 g/dL    RDW 11.9 (L) 12.3 - 15.4 %    RDW-SD 40.8 37.0 - 54.0 fl    MPV 9.8 6.0 - 12.0 fL    Platelets 235 140 - 450 10*3/mm3    Neutrophil % 44.3 42.7 - 76.0 %    Lymphocyte % 45.7 (H) 19.6 - 45.3 %    Monocyte % 7.6 5.0 - 12.0 %    Eosinophil % 1.5 0.3 - 6.2 %    Basophil % 0.6 0.0 - 1.5 %    Immature Grans % 0.3 0.0 - 0.5 %    Neutrophils, Absolute 4.47 1.70 - 7.00 10*3/mm3    Lymphocytes, Absolute 4.62 (H) 0.70 - 3.10 10*3/mm3    Monocytes, Absolute 0.77 0.10 - 0.90 10*3/mm3    Eosinophils, Absolute 0.15 0.00 - 0.40 10*3/mm3    Basophils, Absolute 0.06 0.00 - 0.20 10*3/mm3    Immature Grans, Absolute 0.03 0.00 - 0.05 10*3/mm3    nRBC 0.0 0.0 - 0.2 /100 WBC   POC Glucose Once    Collection Time: 10/04/21  3:30 PM    Specimen: Blood   Result Value Ref Range    Glucose 102 70 - 130 mg/dL   Urinalysis With Microscopic If Indicated (No Culture) - Urine, Clean Catch    Collection Time: 10/04/21  4:17 PM    Specimen:  Urine, Clean Catch   Result Value Ref Range    Color, UA Yellow Yellow, Straw    Appearance, UA Clear Clear    pH, UA 6.0 5.0 - 8.0    Specific Gravity, UA 1.025 1.005 - 1.030    Glucose, UA Negative Negative    Ketones, UA 15 mg/dL (1+) (A) Negative    Bilirubin, UA Small (1+) (A) Negative    Blood, UA Negative Negative    Protein,  mg/dL (2+) (A) Negative    Leuk Esterase, UA Small (1+) (A) Negative    Nitrite, UA Negative Negative    Urobilinogen, UA 1.0 E.U./dL 0.2 - 1.0 E.U./dL     Note: In addition to lab results from this visit, the labs listed above may include labs taken at another facility or during a different encounter within the last 24 hours. Please correlate lab times with ED admission and discharge times for further clarification of the services performed during this visit.    XR Chest 1 View   Preliminary Result   No new chest disease.                 Vitals:    10/04/21 1617 10/04/21 1618 10/04/21 1619 10/04/21 1629   BP: 121/68 101/62 (!) 78/49 113/78   BP Location:    Left arm   Patient Position: Lying Sitting Standing Sitting   Pulse: 89 90 100 76   Resp:    16   Temp:       TempSrc:       SpO2: 95%   96%   Weight:       Height:         Medications   sodium chloride 0.9 % flush 10 mL (has no administration in time range)   sodium chloride 0.9 % bolus 1,000 mL (1,000 mL Intravenous New Bag 10/4/21 1627)     ECG/EMG Results (last 24 hours)     Procedure Component Value Units Date/Time    ECG 12 Lead [[id number]] Collected: 10/04/21 1514     Updated: 10/04/21 1515        ECG 12 Lead                     XR Chest 1 View    Result Date: 10/5/2021  EXAMINATION: XR CHEST 1 VW-  INDICATION: Weak, dizzy, AMS triage protocol.  COMPARISON: 06/29/2021.  FINDINGS: Heart, mediastinum and pulmonary vasculature appear within normal limits. Mild coarsening of the pulmonary interstitial markings appears stable from the prior exam, apparently chronic. No new pulmonary parenchymal disease, evidence of edema,  effusion or pneumothorax is seen.      No new chest disease.   D:  10/04/2021 E:  10/05/2021  This report was finalized on 10/5/2021 9:42 PM by Dr. Adriano Lopez MD.                MDM    Final diagnoses:   Lightheadedness   Generalized weakness   History of recent stroke   LOTTIE (acute kidney injury) (HCC)   Orthostatic hypotension   Weight loss       ED Disposition  ED Disposition     ED Disposition Condition Comment    Decision to Admit            No follow-up provider specified.       Medication List      No changes were made to your prescriptions during this visit.          Kendell Pichardo,   10/11/21 0953

## 2021-10-04 NOTE — PATIENT INSTRUCTIONS

## 2021-10-04 NOTE — CASE MANAGEMENT/SOCIAL WORK
Discharge Planning Assessment  [facility]     Patient Name: Andry Potter  MRN: [id number]  Today's Date: 10/4/2021    Admit Date: 10/4/2021    Discharge Needs Assessment     Row Name 10/04/21 1749       Living Environment    Lives With  alone    Current Living Arrangements  home/apartment/condo    Primary Care Provided by  self    Provides Primary Care For  no one    Family Caregiver if Needed  sibling(s);friend(s)    Family Caregiver Names  ANGELI JAIME - Relation: Friend - Home: [phone]    Quality of Family Relationships  helpful;involved;supportive    Able to Return to Prior Arrangements  yes       Resource/Environmental Concerns    Resource/Environmental Concerns  none    Transportation Concerns  car, none       Transition Planning    Patient/Family Anticipates Transition to  home    Patient/Family Anticipated Services at Transition  ;rehabilitation services    Transportation Anticipated  family or friend will provide       Discharge Needs Assessment    Readmission Within the Last 30 Days  no previous admission in last 30 days    Equipment Currently Used at Home  cpap    Concerns to be Addressed  discharge planning    Anticipated Changes Related to Illness  none    Current Discharge Risk  lives alone        Discharge Plan     Row Name 10/04/21 9276       Plan    Plan  Initial    Plan Comments  CM spoke with patient via phone. Patient resides in [place], alone. Patient's friendAngeli & sisterRubina assist patient as needed. Patient is independent of ADL's. Patient has a cpap provided by [employer]. Patient denies any current home health or outpatient services. Patient has medical insurance, prescription coverage and is able to afford/obtain medications without difficulty. Patient denies any discharge planning needs. Goal is home. CM will continue to follow.    Final Discharge Disposition Code  30 - still a patient        Continued Care and Services - Admitted Since 10/4/2021    Coordination has  not been started for this encounter.         Demographic Summary     Row Name 10/04/21 1729       General Information    Arrived From  home    Referral Source  emergency department    Reason for Consult  discharge planning    Preferred Language  English     Used During This Interaction  no       Contact Information    Contact Information Comments  TSEPHENIE JAIME - Relation: Friend - Home: [phone]        Functional Status     Row Name 10/04/21 1730       Functional Status    Usual Activity Tolerance  moderate    Current Activity Tolerance  moderate       Functional Status, IADL    Medications  independent    Meal Preparation  independent    Housekeeping  independent    Laundry  independent    Shopping  independent       Mental Status    General Appearance WDL  WDL       Mental Status Summary    Recent Changes in Mental Status/Cognitive Functioning  no changes       Employment/    Employment Status  retired        Psychosocial    No documentation.       Abuse/Neglect    No documentation.       Legal    No documentation.       Substance Abuse    No documentation.       Patient Forms    No documentation.           Renetta Rutledge RN

## 2021-10-04 NOTE — PROGRESS NOTES
"    Chief Complaint   Patient presents with   • Hypertension     blood pressure and kidney issues    • Abnormal Lab     GFR has gone down       HPI     Andry Potter is a pleasant 66 y.o. male with PMH of CVA in June 2021, CAD, type 2 DM, and CKDwho presents for evaluation of \"chief complaint.\"     Patient was seen by neurosurgery today who referred him to our office. They were concerned about declining renal function and lower blood pressure. Creatinine was found to be 1.9 up from his baseline of 1.1. His blood pressure earlier today was 92/48. He is accompanied by a friend who contributes to the history. The patient has been very weak and cannot do the things he did prior to his stroke in June. He has lost 40 pounds since January of this year. His urinary stream is slow but this has not significantly changed. His friend states it is a fight to get him to eat or drink anything. She also adds that he has nausea and vomiting with food and liquids intermittently. Symptoms have progressed since about 1 month after his stroke. His taste also changed shortly after his stroke. The patient lives alone. His friend noticed changes when camping recently.     Past Medical History:   Diagnosis Date   • BPH (benign prostatic hyperplasia)    • Diabetes mellitus (HCC)    • Hyperlipidemia    • Hypertension    • Stroke (HCC)        Past Surgical History:   Procedure Laterality Date   • CARDIAC CATHETERIZATION N/A 9/9/2020    Procedure: Left Heart Cath 76268;  Surgeon: Bang Walker MD;  Location:  [facility];  Service: Cardiology;  Laterality: N/A;   • COLONOSCOPY     • HAND SURGERY     • HIP BIPOLAR REPLACEMENT     • INTERVENTIONAL RADIOLOGY PROCEDURE N/A 6/29/2021    Procedure: IR MECHANICAL THROMBECTOMY - PRIMARY;  Surgeon: Dru Gomes MD;  Location:  [facility];  Service: Interventional Radiology;  Laterality: N/A;       Family History   Problem Relation Age of Onset   • Diabetes " Mother        Social History     Socioeconomic History   • Marital status: Single     Spouse name: Not on file   • Number of children: Not on file   • Years of education: Not on file   • Highest education level: Not on file   Tobacco Use   • Smoking status: Former Smoker     Types: Cigarettes   • Smokeless tobacco: Never Used   • Tobacco comment: quit 30 years ago   Vaping Use   • Vaping Use: Never used   Substance and Sexual Activity   • Alcohol use: Yes     Comment: once a month   • Drug use: Never   • Sexual activity: Defer       No Known Allergies    ROS    Review of Systems   Constitutional: Negative for chills and fever.   Respiratory: Negative for cough and shortness of breath.    Cardiovascular: Negative for chest pain.   Gastrointestinal: Positive for nausea, vomiting and GERD. Negative for abdominal pain, blood in stool and diarrhea.   Genitourinary: Positive for difficulty urinating. Negative for dysuria, flank pain, hematuria and nocturia.   Neurological: Positive for dizziness, weakness and light-headedness. Negative for syncope.       Vitals:    10/04/21 1440   BP: (!) 60/40   Pulse:    SpO2:      Body mass index is 28.18 kg/m².      Current Outpatient Medications:   •  aspirin 81 MG EC tablet, Take 1 tablet by mouth Daily., Disp: 90 tablet, Rfl: 0  •  escitalopram (Lexapro) 5 MG tablet, Take 1 tablet by mouth Daily., Disp: 30 tablet, Rfl: 0  •  Niacin (VITAMIN B-3 PO), Take 1 tablet by mouth Daily., Disp: , Rfl:   •  rivaroxaban (Xarelto) 10 MG tablet, Take 10 mg by mouth Daily., Disp: , Rfl:   •  rosuvastatin (Crestor) 40 MG tablet, Take 1 tablet by mouth Daily., Disp: 30 tablet, Rfl: 0  •  SITagliptin (Januvia) 50 MG tablet, Take 1 tablet by mouth Daily., Disp: 30 tablet, Rfl: 5    PE    Physical Exam  Vitals reviewed.   Constitutional:       General: He is not in acute distress.     Appearance: He is well-developed. He is obese.   HENT:      Head: Normocephalic and atraumatic.   Eyes:       Conjunctiva/sclera: Conjunctivae normal.   Cardiovascular:      Rate and Rhythm: Normal rate and regular rhythm.      Heart sounds: Normal heart sounds. No murmur heard.     Pulmonary:      Effort: Pulmonary effort is normal.      Breath sounds: Normal breath sounds. No wheezing, rhonchi or rales.   Musculoskeletal:      Cervical back: Normal range of motion.      Right lower leg: No edema.      Left lower leg: No edema.   Skin:     General: Skin is warm and dry.   Neurological:      Mental Status: He is alert.      Gait: Gait normal.   Psychiatric:         Speech: Speech normal.         Behavior: Behavior normal.          A/P    Problem List Items Addressed This Visit        Endocrine and Metabolic    Type 2 diabetes mellitus (HCC)    Relevant Medications    SITagliptin (Januvia) 50 MG tablet      Other Visit Diagnoses     LOTTIE (acute kidney injury) ([facility])    -  Primary  -D/c metformin  -Recommended patient start Januvia and monitor glucoses  -Testing supplies will be sent to his pharmacy      Hypotension, unspecified hypotension type      -Hx of 40 pound wt loss, stroke with change in taste in June, chronic nausea and vomiting  -D/c lisinopril but patient has already taken this today  -I referred the patient to [facility] ER for further evaluation of LOTTIE and hypotension  -Called report to LAVERN Bullard at [facility] ER      Anemia, unspecified type      -On aspirin, xarelto  -Needs workup following ER evaluation      Nausea and vomiting, intractability of vomiting not specified, unspecified vomiting type              Plan of care was reviewed with patient at the conclusion of today's visit. Education was provided regarding diagnoses, management, prescribed or recommended OTC products, and the importance of compliance with follow-up appointments. The patient was counseled regarding the risks, benefits, and possible side-effects of treatment. I advised the patient to keep me informed of any acute changes in their status including new,  worsening, or persistent symptoms. Patient expresses understanding and agreement with the management plan.        LAISHA Cid

## 2021-10-04 NOTE — PROGRESS NOTES
NAME: DESMOND POTTER   DOS: 10/4/2021  : 1955  PCP: Meghan Watt APRN    Chief Complaint:  Thrombectomy      History of Present Illness: Mr. Potter is a 66 y.o. male who is seen today status post mechanical thrombectomy for a right MCA occlusion on 2021.  Patient has significant past medical history of hypertension, hyperlipidemia, coronary artery disease, obesity, and sleep apnea.  He presented to [facility] with left-sided weakness and dysarthria that occurred while working in his yard.  Patient did receive TPA therapy and was transported for a thrombectomy.  Patient responded well to mechanical thrombectomy with successful extraction of sizable thrombus and restoration of flow.    Today, patient notes she is doing well from a neuro interventional standpoint.  he does note overall weakness, exhaustion, decreased appetite with nausea.  However, he denies any difficulty with speech, changes in vision, or unilateral weakness or numbness.   He has been followed up by Dr. Gallardo and is currently being managed with Xarelto.  He is being seen by his PCP for ongoing medical management.      Past Medical History:   Diagnosis Date   • BPH (benign prostatic hyperplasia)    • Diabetes mellitus (HCC)    • Hyperlipidemia    • Hypertension    • Stroke (HCC)        Past Surgical History:   Procedure Laterality Date   • CARDIAC CATHETERIZATION N/A 2020    Procedure: Left Heart Cath 70825;  Surgeon: Bang Walker MD;  Location:  [facility];  Service: Cardiology;  Laterality: N/A;   • COLONOSCOPY     • HAND SURGERY     • HIP BIPOLAR REPLACEMENT     • INTERVENTIONAL RADIOLOGY PROCEDURE N/A 2021    Procedure: IR MECHANICAL THROMBECTOMY - PRIMARY;  Surgeon: Dru Gmoes MD;  Location:  [facility];  Service: Interventional Radiology;  Laterality: N/A;             Review of Systems   Constitutional: Positive for appetite change, fatigue and unexpected  weight change.   Musculoskeletal: Positive for neck stiffness.   Neurological: Positive for weakness.   All other systems reviewed and are negative.           Medications:    Current Outpatient Medications:   •  aspirin 81 MG EC tablet, Take 1 tablet by mouth Daily., Disp: 90 tablet, Rfl: 0  •  escitalopram (Lexapro) 5 MG tablet, Take 1 tablet by mouth Daily., Disp: 30 tablet, Rfl: 0  •  lisinopril (PRINIVIL,ZESTRIL) 10 MG tablet, Take 10 mg by mouth Daily., Disp: , Rfl:   •  metFORMIN (GLUCOPHAGE) 1000 MG tablet, Take 1,000 mg by mouth 2 (Two) Times a Day With Meals., Disp: , Rfl:   •  Niacin (VITAMIN B-3 PO), Take 1 tablet by mouth Daily., Disp: , Rfl:   •  rivaroxaban (Xarelto) 10 MG tablet, Take 10 mg by mouth Daily., Disp: , Rfl:   •  rosuvastatin (Crestor) 40 MG tablet, Take 1 tablet by mouth Daily., Disp: 30 tablet, Rfl: 0  •  tamsulosin (FLOMAX) 0.4 MG capsule 24 hr capsule, 1 capsule Daily., Disp: , Rfl:     Allergies:  No Known Allergies    Social History     Tobacco Use   • Smoking status: Former Smoker     Types: Cigarettes   • Smokeless tobacco: Never Used   • Tobacco comment: quit 30 years ago   Vaping Use   • Vaping Use: Never used   Substance Use Topics   • Alcohol use: Yes     Comment: once a month   • Drug use: Never       Family History   Problem Relation Age of Onset   • Diabetes Mother        Review of Imaging:  No new imaging to review    Vitals:    10/04/21 1255   BP: 92/48   Temp: 97.1 °F (36.2 °C)     Body mass index is 28.27 kg/m².    Physical Exam  Neurological:      Mental Status: He is oriented to person, place, and time.      Gait: Gait is intact.      Deep Tendon Reflexes: Strength normal.   Psychiatric:         Speech: Speech normal.       Neurologic Exam     Mental Status   Oriented to person, place, and time.   Speech: speech is normal     Cranial Nerves   Cranial nerves II through XII intact.     Motor Exam     Strength   Strength 5/5 throughout.     Sensory Exam   Light touch  normal.     Gait, Coordination, and Reflexes     Gait  Gait: normal      Diagnoses/Plan:    Mr. Potter is a 66 y.o. male who is seen today status post right MCA thrombectomy on 6/29/2021.  Neurologically, patient is doing well.  He is able to complete all activities of daily living (bathroom, shower, cooking, left alone).  However, he does note difficulties with generalized weakness and decreased appetite.  Did review the patient's recent labs that did demonstrate significant changes in patient's creatinine and GFR over the last 3 months, in addition to stable anemia since patient's hospital discharge.  Recommended to patient to continue to work with PCP in regards to appropriate work-up and referrals.  In addition, recommend patient to continue to work with cardiology due to the concern of patient's atrial fibrillation.  Patient also had a noted low blood pressure at today's appointment, recommend them calling their PCP for ongoing monitoring.  From our standpoint, patient can follow-up with us as needed.  Did instruct patient that if he experiences any TIA or strokelike symptoms to call our office and/or 911.  Patient and wife know understanding and willing to proceed.      MRS: 0     Patient's Body mass index is 28.27 kg/m². indicating that he is overweight (BMI 25-29.9). Obesity-related health conditions include the following: hypertension, diabetes mellitus and dyslipidemias. Obesity is newly identified. BMI is is above average; BMI management plan is completed. We discussed Has lost significant weight over the last several months.  Recommend patient to follow-up with PCP..             Annette Owens PA-C

## 2021-10-05 ENCOUNTER — APPOINTMENT (OUTPATIENT)
Dept: CT IMAGING | Facility: HOSPITAL | Age: 66
End: 2021-10-05

## 2021-10-05 ENCOUNTER — ANESTHESIA EVENT (OUTPATIENT)
Dept: GASTROENTEROLOGY | Facility: HOSPITAL | Age: 66
End: 2021-10-05

## 2021-10-05 PROBLEM — R93.5 ABNORMAL CT OF THE ABDOMEN: Status: ACTIVE | Noted: 2021-10-05

## 2021-10-05 PROBLEM — N17.9 AKI (ACUTE KIDNEY INJURY) (HCC): Status: ACTIVE | Noted: 2021-10-05

## 2021-10-05 PROBLEM — E44.0 MODERATE MALNUTRITION (HCC): Status: ACTIVE | Noted: 2021-10-05

## 2021-10-05 LAB
ALBUMIN SERPL-MCNC: 4 G/DL (ref 3.5–5.2)
ALBUMIN/GLOB SERPL: 1.5 G/DL
ALP SERPL-CCNC: 118 U/L (ref 39–117)
ALPHA-FETOPROTEIN: 4.88 NG/ML (ref 0–8.3)
ALT SERPL W P-5'-P-CCNC: 56 U/L (ref 1–41)
ANION GAP SERPL CALCULATED.3IONS-SCNC: 14 MMOL/L (ref 5–15)
AST SERPL-CCNC: 52 U/L (ref 1–40)
BASOPHILS # BLD AUTO: 0.04 10*3/MM3 (ref 0–0.2)
BASOPHILS NFR BLD AUTO: 0.5 % (ref 0–1.5)
BILIRUB SERPL-MCNC: 0.4 MG/DL (ref 0–1.2)
BUN SERPL-MCNC: 33 MG/DL (ref 8–23)
BUN/CREAT SERPL: 19.8 (ref 7–25)
CALCIUM SPEC-SCNC: 9.1 MG/DL (ref 8.6–10.5)
CEA SERPL-MCNC: 48.9 NG/ML
CHLORIDE SERPL-SCNC: 104 MMOL/L (ref 98–107)
CO2 SERPL-SCNC: 18 MMOL/L (ref 22–29)
CORTIS SERPL-MCNC: 10.96 MCG/DL
CREAT SERPL-MCNC: 1.67 MG/DL (ref 0.76–1.27)
DEPRECATED RDW RBC AUTO: 40.5 FL (ref 37–54)
EOSINOPHIL # BLD AUTO: 0.15 10*3/MM3 (ref 0–0.4)
EOSINOPHIL NFR BLD AUTO: 2 % (ref 0.3–6.2)
ERYTHROCYTE [DISTWIDTH] IN BLOOD BY AUTOMATED COUNT: 11.8 % (ref 12.3–15.4)
FOLATE SERPL-MCNC: 10.1 NG/ML (ref 4.78–24.2)
GFR SERPL CREATININE-BSD FRML MDRD: 41 ML/MIN/1.73
GLOBULIN UR ELPH-MCNC: 2.6 GM/DL
GLUCOSE BLDC GLUCOMTR-MCNC: 103 MG/DL (ref 70–130)
GLUCOSE BLDC GLUCOMTR-MCNC: 110 MG/DL (ref 70–130)
GLUCOSE BLDC GLUCOMTR-MCNC: 129 MG/DL (ref 70–130)
GLUCOSE BLDC GLUCOMTR-MCNC: 138 MG/DL (ref 70–130)
GLUCOSE SERPL-MCNC: 125 MG/DL (ref 65–99)
HCT VFR BLD AUTO: 30.4 % (ref 37.5–51)
HGB BLD-MCNC: 10.3 G/DL (ref 13–17.7)
HIV1+2 AB SER QL: NORMAL
IMM GRANULOCYTES # BLD AUTO: 0.02 10*3/MM3 (ref 0–0.05)
IMM GRANULOCYTES NFR BLD AUTO: 0.3 % (ref 0–0.5)
IRON 24H UR-MRATE: 50 MCG/DL (ref 59–158)
IRON SATN MFR SERPL: 17 % (ref 20–50)
LYMPHOCYTES # BLD AUTO: 3.44 10*3/MM3 (ref 0.7–3.1)
LYMPHOCYTES NFR BLD AUTO: 44.7 % (ref 19.6–45.3)
MAGNESIUM SERPL-MCNC: 1.5 MG/DL (ref 1.6–2.4)
MCH RBC QN AUTO: 32.1 PG (ref 26.6–33)
MCHC RBC AUTO-ENTMCNC: 33.9 G/DL (ref 31.5–35.7)
MCV RBC AUTO: 94.7 FL (ref 79–97)
MONOCYTES # BLD AUTO: 0.5 10*3/MM3 (ref 0.1–0.9)
MONOCYTES NFR BLD AUTO: 6.5 % (ref 5–12)
NEUTROPHILS NFR BLD AUTO: 3.54 10*3/MM3 (ref 1.7–7)
NEUTROPHILS NFR BLD AUTO: 46 % (ref 42.7–76)
NRBC BLD AUTO-RTO: 0 /100 WBC (ref 0–0.2)
PHOSPHATE SERPL-MCNC: 2.8 MG/DL (ref 2.5–4.5)
PLATELET # BLD AUTO: 206 10*3/MM3 (ref 140–450)
PMV BLD AUTO: 9.9 FL (ref 6–12)
POTASSIUM SERPL-SCNC: 4.9 MMOL/L (ref 3.5–5.2)
PROT SERPL-MCNC: 6.6 G/DL (ref 6–8.5)
RBC # BLD AUTO: 3.21 10*6/MM3 (ref 4.14–5.8)
SODIUM SERPL-SCNC: 136 MMOL/L (ref 136–145)
TIBC SERPL-MCNC: 292 MCG/DL (ref 298–536)
TRANSFERRIN SERPL-MCNC: 196 MG/DL (ref 200–360)
TSH SERPL DL<=0.05 MIU/L-ACNC: 1.21 UIU/ML (ref 0.27–4.2)
VIT B12 BLD-MCNC: 575 PG/ML (ref 211–946)
WBC # BLD AUTO: 7.69 10*3/MM3 (ref 3.4–10.8)

## 2021-10-05 PROCEDURE — 82607 VITAMIN B-12: CPT | Performed by: INTERNAL MEDICINE

## 2021-10-05 PROCEDURE — 97165 OT EVAL LOW COMPLEX 30 MIN: CPT

## 2021-10-05 PROCEDURE — 85025 COMPLETE CBC W/AUTO DIFF WBC: CPT | Performed by: INTERNAL MEDICINE

## 2021-10-05 PROCEDURE — 86301 IMMUNOASSAY TUMOR CA 19-9: CPT | Performed by: INTERNAL MEDICINE

## 2021-10-05 PROCEDURE — 99226 PR SBSQ OBSERVATION CARE/DAY 35 MINUTES: CPT | Performed by: INTERNAL MEDICINE

## 2021-10-05 PROCEDURE — 96366 THER/PROPH/DIAG IV INF ADDON: CPT

## 2021-10-05 PROCEDURE — 25010000002 MAGNESIUM SULFATE 2 GM/50ML SOLUTION: Performed by: INTERNAL MEDICINE

## 2021-10-05 PROCEDURE — 80053 COMPREHEN METABOLIC PANEL: CPT | Performed by: PHYSICIAN ASSISTANT

## 2021-10-05 PROCEDURE — G0432 EIA HIV-1/HIV-2 SCREEN: HCPCS | Performed by: INTERNAL MEDICINE

## 2021-10-05 PROCEDURE — G0378 HOSPITAL OBSERVATION PER HR: HCPCS

## 2021-10-05 PROCEDURE — 84466 ASSAY OF TRANSFERRIN: CPT | Performed by: INTERNAL MEDICINE

## 2021-10-05 PROCEDURE — 74176 CT ABD & PELVIS W/O CONTRAST: CPT

## 2021-10-05 PROCEDURE — 83540 ASSAY OF IRON: CPT | Performed by: INTERNAL MEDICINE

## 2021-10-05 PROCEDURE — 83735 ASSAY OF MAGNESIUM: CPT | Performed by: INTERNAL MEDICINE

## 2021-10-05 PROCEDURE — 84443 ASSAY THYROID STIM HORMONE: CPT | Performed by: INTERNAL MEDICINE

## 2021-10-05 PROCEDURE — 82533 TOTAL CORTISOL: CPT | Performed by: INTERNAL MEDICINE

## 2021-10-05 PROCEDURE — 82105 ALPHA-FETOPROTEIN SERUM: CPT | Performed by: INTERNAL MEDICINE

## 2021-10-05 PROCEDURE — 99214 OFFICE O/P EST MOD 30 MIN: CPT | Performed by: PHYSICIAN ASSISTANT

## 2021-10-05 PROCEDURE — 63710000001 DIPHENHYDRAMINE PER 50 MG: Performed by: INTERNAL MEDICINE

## 2021-10-05 PROCEDURE — 84100 ASSAY OF PHOSPHORUS: CPT | Performed by: INTERNAL MEDICINE

## 2021-10-05 PROCEDURE — 96365 THER/PROPH/DIAG IV INF INIT: CPT

## 2021-10-05 PROCEDURE — 82378 CARCINOEMBRYONIC ANTIGEN: CPT | Performed by: INTERNAL MEDICINE

## 2021-10-05 PROCEDURE — 82962 GLUCOSE BLOOD TEST: CPT

## 2021-10-05 PROCEDURE — 82746 ASSAY OF FOLIC ACID SERUM: CPT | Performed by: INTERNAL MEDICINE

## 2021-10-05 PROCEDURE — 97535 SELF CARE MNGMENT TRAINING: CPT

## 2021-10-05 PROCEDURE — 97161 PT EVAL LOW COMPLEX 20 MIN: CPT

## 2021-10-05 RX ORDER — MIRTAZAPINE 15 MG/1
15 TABLET, FILM COATED ORAL NIGHTLY
Status: DISCONTINUED | OUTPATIENT
Start: 2021-10-05 | End: 2021-10-06 | Stop reason: HOSPADM

## 2021-10-05 RX ORDER — MAGNESIUM SULFATE HEPTAHYDRATE 40 MG/ML
2 INJECTION, SOLUTION INTRAVENOUS AS NEEDED
Status: DISCONTINUED | OUTPATIENT
Start: 2021-10-05 | End: 2021-10-06 | Stop reason: HOSPADM

## 2021-10-05 RX ORDER — PANTOPRAZOLE SODIUM 40 MG/1
40 TABLET, DELAYED RELEASE ORAL
Status: DISCONTINUED | OUTPATIENT
Start: 2021-10-05 | End: 2021-10-06 | Stop reason: HOSPADM

## 2021-10-05 RX ORDER — METOCLOPRAMIDE 10 MG/1
5 TABLET ORAL
Status: DISCONTINUED | OUTPATIENT
Start: 2021-10-05 | End: 2021-10-06 | Stop reason: HOSPADM

## 2021-10-05 RX ORDER — MAGNESIUM SULFATE HEPTAHYDRATE 40 MG/ML
4 INJECTION, SOLUTION INTRAVENOUS AS NEEDED
Status: DISCONTINUED | OUTPATIENT
Start: 2021-10-05 | End: 2021-10-06 | Stop reason: HOSPADM

## 2021-10-05 RX ADMIN — METOCLOPRAMIDE 5 MG: 10 TABLET ORAL at 21:45

## 2021-10-05 RX ADMIN — MIRTAZAPINE 15 MG: 15 TABLET, FILM COATED ORAL at 21:45

## 2021-10-05 RX ADMIN — MAGNESIUM SULFATE HEPTAHYDRATE 2 G: 2 INJECTION, SOLUTION INTRAVENOUS at 17:38

## 2021-10-05 RX ADMIN — ROSUVASTATIN CALCIUM 40 MG: 20 TABLET, COATED ORAL at 07:55

## 2021-10-05 RX ADMIN — PANTOPRAZOLE SODIUM 40 MG: 40 TABLET, DELAYED RELEASE ORAL at 11:04

## 2021-10-05 RX ADMIN — ESCITALOPRAM OXALATE 5 MG: 10 TABLET ORAL at 07:56

## 2021-10-05 RX ADMIN — NYSTATIN 500000 UNITS: 100000 SUSPENSION ORAL at 16:44

## 2021-10-05 RX ADMIN — NYSTATIN 500000 UNITS: 100000 SUSPENSION ORAL at 21:45

## 2021-10-05 RX ADMIN — NYSTATIN 500000 UNITS: 100000 SUSPENSION ORAL at 07:56

## 2021-10-05 RX ADMIN — DIPHENHYDRAMINE HYDROCHLORIDE 25 MG: 25 CAPSULE ORAL at 21:45

## 2021-10-05 RX ADMIN — MAGNESIUM SULFATE HEPTAHYDRATE 2 G: 2 INJECTION, SOLUTION INTRAVENOUS at 21:49

## 2021-10-05 RX ADMIN — MAGNESIUM SULFATE HEPTAHYDRATE 2 G: 2 INJECTION, SOLUTION INTRAVENOUS at 14:55

## 2021-10-05 RX ADMIN — METOCLOPRAMIDE 5 MG: 10 TABLET ORAL at 16:44

## 2021-10-05 RX ADMIN — SODIUM CHLORIDE, PRESERVATIVE FREE 10 ML: 5 INJECTION INTRAVENOUS at 21:45

## 2021-10-05 RX ADMIN — NYSTATIN 500000 UNITS: 100000 SUSPENSION ORAL at 11:04

## 2021-10-05 RX ADMIN — METOCLOPRAMIDE 5 MG: 10 TABLET ORAL at 11:56

## 2021-10-05 NOTE — THERAPY EVALUATION
Patient Name: Andry Potter  : 1955    MRN: [id number]                              Today's Date: 10/5/2021       Admit Date: 10/4/2021    Visit Dx:     ICD-10-CM ICD-9-CM   1. Lightheadedness  R42 780.4   2. Generalized weakness  R53.1 780.79   3. History of recent stroke  Z86.73 V12.54   4. LOTTIE (acute kidney injury) (HCC)  N17.9 584.9   5. Orthostatic hypotension  I95.1 458.0   6. Weight loss  R63.4 783.21   7. Weakness  R53.1 780.79     Patient Active Problem List   Diagnosis   • Abnormal stress test   • Type 2 diabetes mellitus (HCC)   • Essential hypertension   • Dyslipidemia   • CAD (coronary artery disease)   • Obesity (BMI 30-39.9)   • Snoring   • Moderate obstructive sleep apnea   • Acute ischemic right MCA stroke, status post TPA and right thrombectomy    • CKD (chronic kidney disease) stage 2, GFR 60-89 ml/min   • Acute ischemic right middle cerebral artery (MCA) stroke (HCC)   • Weakness   • Weight loss     Past Medical History:   Diagnosis Date   • BPH (benign prostatic hyperplasia)    • Diabetes mellitus (HCC)    • Hyperlipidemia    • Hypertension    • Stroke (HCC)      Past Surgical History:   Procedure Laterality Date   • CARDIAC CATHETERIZATION N/A 2020    Procedure: Left Heart Cath 22955;  Surgeon: Bang Walker MD;  Location:  [facility];  Service: Cardiology;  Laterality: N/A;   • COLONOSCOPY     • HAND SURGERY     • HIP BIPOLAR REPLACEMENT     • INTERVENTIONAL RADIOLOGY PROCEDURE N/A 2021    Procedure: IR MECHANICAL THROMBECTOMY - PRIMARY;  Surgeon: Dru Gomes MD;  Location:  [facility];  Service: Interventional Radiology;  Laterality: N/A;     General Information     Row Name 10/05/21 0909          OT Time and Intention    Document Type  evaluation  -JY     Mode of Treatment  occupational therapy  -JY     Row Name 10/05/21 0909          General Information    Patient Profile Reviewed  yes  -JY     Prior Level of Function   independent:;all household mobility;community mobility;gait;transfer;bed mobility;ADL's;feeding;grooming;dressing;bathing;home management;cooking;cleaning;driving;using stairs  -JY     Existing Precautions/Restrictions  other (see comments) recent hx of hypotension, remote R MCA (no apparent residual effects), recnet ~ 40# weight loss  -JY     Barriers to Rehab  none identified  -JY     Row Name 10/05/21 0909          Living Environment    Lives With  alone;other (see comments) lives in basement of home with friend(s) upstairs, has friend and sister who are able to assist some  -JY     Row Name 10/05/21 0909          Home Main Entrance    Number of Stairs, Main Entrance  two  -JY     Stair Railings, Main Entrance  railing on right side (ascending)  -JY     Row Name 10/05/21 0909          Stairs Within Home, Primary    Stairs, Within Home, Primary  16; steps to/from basement where pt lives within the home  -JY     Number of Stairs, Within Home, Primary  other (see comments) 16  -JY     Stair Railings, Within Home, Primary  railing on right side (ascending)  -JY     Stairs Comment, Within Home, Primary  pt has walk in shower and elevated toilet seat; DME: not using any at baseline however has FWW and SPC in storage  -JY     Row Name 10/05/21 0909          Cognition    Orientation Status (Cognition)  oriented x 4  -JY     Row Name 10/05/21 0909          Safety Issues, Functional Mobility    Impairments Affecting Function (Mobility)  endurance/activity tolerance  -JY     Comment, Safety Issues/Impairments (Mobility)  pt alert and able to follow commands, did not utilize any AD throughout, demonstrated good safety awareness and control, mild decrease noted in fxl endurance  -JY       User Key  (r) = Recorded By, (t) = Taken By, (c) = Cosigned By    Initials Name Provider Type    Elizabeth Barry OT Occupational Therapist          Mobility/ADL's     Row Name 10/05/21 0914          Bed Mobility    Bed Mobility  other  (see comments) pt received UIC  -JY     Comment (Bed Mobility)  pt received UIC and preferred to remain OOB thus bed mobility was not assessed  -JY     Row Name 10/05/21 0914          Transfers    Transfers  sit-stand transfer;toilet transfer  -JY     Comment (Transfers)  pt demonstrated good safety awareness and control in fxl ADLs, did not utilize any AD; denied any dizziness or feeling LH despite blood pressure lower at end of OT session  -JY     Sit-Stand [place] (Transfers)  standby assist  -JY     [place] Level (Toilet Transfer)  standby assist  -JY     Assistive Device (Toilet Transfer)  other (see comments) no AD  -JY     Row Name 10/05/21 0914          Sit-Stand Transfer    Assistive Device (Sit-Stand Transfers)  other (see comments) no AD  -JY     Row Name 10/05/21 0914          Toilet Transfer    Type (Toilet Transfer)  sit-stand;stand-sit  -JY     Row Name 10/05/21 0914          Functional Mobility    Functional Mobility- Ind. Level  standby assist;contact guard assist;verbal cues required  -JY     Functional Mobility- Device  other (see comments) no AD  -JY     Functional Mobility-Distance (Feet)  -- in room distances for ADLs  -JY     Functional Mobility- Comment  pt grossly req'd SBA to CGA for in room ADL related mobility, increased A for safety given hypotensive hx, did not utilize AD, mild decrease in fxl endurance s/p fxl mobility and t/f related to ADLs  -JY     Row Name 10/05/21 0914          Activities of Daily Living    BADL Assessment/Intervention  upper body dressing;lower body dressing;grooming  -JY     Row Name 10/05/21 0914          Upper Body Dressing Assessment/Training    [place] Level (Upper Body Dressing)  doff;don;pajama/robe;independent  -JY     Position (Upper Body Dressing)  unsupported sitting;unsupported standing  -JY     Comment (Upper Body Dressing)  situational A to tie back of gown  -JY     Row Name 10/05/21 0914          Lower Body Dressing  Assessment/Training    [place] Level (Lower Body Dressing)  doff;don;socks;shoes/slippers;independent  -J     Position (Lower Body Dressing)  unsupported sitting  -     Row Name 10/05/21 0914          Grooming Assessment/Training    [place] Level (Grooming)  wash face, hands;hair care, combing/brushing;independent  -     Position (Grooming)  unsupported standing;sink side  -       User Key  (r) = Recorded By, (t) = Taken By, (c) = Cosigned By    Initials Name Provider Type    Elizabeth Barry, OT Occupational Therapist        Obj/Interventions     Row Name 10/05/21 0920          Sensory Assessment (Somatosensory)    Sensory Assessment (Somatosensory)  bilateral UE;sensation intact  -     Bilateral UE Sensory Assessment  general sensation;light touch awareness;light touch localization;intact  -     Row Name 10/05/21 0920          Sensory Interventions    Comment, Sensory Intervention  pt denies any numbness or tingling and able to recognize all stimuli at BUEs as equal and intact  -     Row Name 10/05/21 0920          Vision Assessment/Intervention    Visual Impairment/Limitations  corrective lenses for reading  -     Vision Assessment Comment  pt denies any blurred vision or visual cuts, reports since remote CVA increased sensitivity to sunlight  -     Row Name 10/05/21 0920          Range of Motion Comprehensive    General Range of Motion  bilateral upper extremity ROM WFL  -J     Comment, General Range of Motion  BUE AROM WFL  -     Row Name 10/05/21 0920          Strength Comprehensive (MMT)    General Manual Muscle Testing (MMT) Assessment  upper extremity strength deficits identified  -     Comment, General Manual Muscle Testing (MMT) Assessment  BUE MMS grossly 4+/5 per MMT  -     Row Name 10/05/21 0920          Motor Skills    Motor Skills  functional endurance  -     Functional Endurance  pt presents with mild decrease in fxl endurance sonia toward more dynamic tasks,  standing tolerance of ~ 4 min prior to seated rest break  -JY     Row Name 10/05/21 0920          Balance    Balance Assessment  sitting static balance;sitting dynamic balance;standing static balance;standing dynamic balance  -JY     Static Sitting Balance  WNL;supported;unsupported;sitting in chair  -JY     Dynamic Sitting Balance  WFL;unsupported;sitting in chair;other (see comments) LBD  -JY     Static Standing Balance  WFL;supported;unsupported;standing  -JY     Dynamic Standing Balance  WFL;supported;unsupported;standing;other (see comments) fxl transfer  -JY     Balance Interventions  sitting;standing;static;dynamic;sit to stand;supported;occupation based/functional task  -JY     Comment, Balance  pt did not present with any LOB during seated or standing tasks, did not utilize any device  -JY       User Key  (r) = Recorded By, (t) = Taken By, (c) = Cosigned By    Initials Name Provider Type    Elizabeth Barry OT Occupational Therapist        Goals/Plan     Row Name 10/05/21 0932          Strength Goal 1 (OT)    Strength Goal 1 (OT)  Pt to complete seated HEP encompassing BUE strengthening x 10 reps, tolerable yet progressive band resistance in order to strengthen UEs for improved integration in ADLs, related t/f  -JY     Time Frame (Strength Goal 1, OT)  long term goal (LTG);by discharge  -JY     Progress/Outcome (Strength Goal 1, OT)  goal ongoing  -JY     Row Name 10/05/21 0932          Problem Specific Goal 1 (OT)    Problem Specific Goal 1 (OT)  Pt to demonstrate increased activity tolerance upward of 7 mins in standing completing dynamic ADLs and related mobility witih VSS (improved understanding of ECWS) in order to progress position for fxl tasks.  -JY     Time Frame (Problem Specific Goal 1, OT)  long term goal (LTG);by discharge  -JY     Progress/Outcome (Problem Specific Goal 1, OT)  goal ongoing  -JY       User Key  (r) = Recorded By, (t) = Taken By, (c) = Cosigned By    Initials Name Provider  Type    Elizabeth Barry, UMBERTO Occupational Therapist        Clinical Impression     Row Name 10/05/21 0925          Pain Assessment    Additional Documentation  Pain Scale: Numbers Pre/Post-Treatment (Group)  -LUL     Row Name 10/05/21 0925          Pain Scale: Numbers Pre/Post-Treatment    Pretreatment Pain Rating  0/10 - no pain  -JY     Posttreatment Pain Rating  0/10 - no pain  -JY     Pre/Posttreatment Pain Comment  pt denies any pain and tolerated all OT interventions  -JY     Pain Intervention(s)  Repositioned;Ambulation/increased activity  -LUL     Row Name 10/05/21 0925          Plan of Care Review    Plan of Care Reviewed With  patient  -LUL     Progress  improving  -JY     Outcome Summary  OT IE completed post chart review. Pt A & O x 4, denies any pain. Pt presents with mild decrease in fxl endurance sonia with more dynamic fxl standing tasks and presents with mild strength deficits at BUEs impacting ADL and related t/f, mob I and safety. Pt received UIC thus no bed mobility assessed, completed d/d gown, socks, shoes and face/hand washing with I, completed STS at recliner, toilet with SBA and fxl mob in room with SBA to CGA without AD, seated rest break grossly after 4 min. Pt did not report any dizziness or feelinig LH during interventions, did present with lower BP toward end of session, RN notified. Pt would benefit from IPOT POC to address UE strengthening, fxl endurance training prior to recommended d/c home with A and HHOT.  -LUL     Row Name 10/05/21 0925          Therapy Assessment/Plan (OT)    Patient/Family Therapy Goal Statement (OT)  to increase I in ADLs, related t/f, return to PLOF  -JY     Rehab Potential (OT)  good, to achieve stated therapy goals  -JY     Criteria for Skilled Therapeutic Interventions Met (OT)  yes;skilled treatment is necessary  -JY     Therapy Frequency (OT)  daily  -LUL     Row Name 10/05/21 0925          Therapy Plan Review/Discharge Plan (OT)    Anticipated Discharge  Disposition (OT)  home with assist;home with home health  -JY     Row Name 10/05/21 0925          Vital Signs    Pre Systolic BP Rehab  117  -JY     Pre Treatment Diastolic BP  69  -JY     Post Systolic BP Rehab  96  -JY     Post Treatment Diastolic BP  65  -JY     Pretreatment Heart Rate (beats/min)  71  -JY     Posttreatment Heart Rate (beats/min)  71  -JY     Pre SpO2 (%)  99  -JY     O2 Delivery Pre Treatment  room air  -JY     O2 Delivery Intra Treatment  room air  -JY     Post SpO2 (%)  97  -JY     O2 Delivery Post Treatment  room air  -JY     Pre Patient Position  Sitting  -JY     Intra Patient Position  Standing  -JY     Post Patient Position  Sitting  -JY     Row Name 10/05/21 0925          Positioning and Restraints    Pre-Treatment Position  sitting in chair/recliner  -JY     Post Treatment Position  chair  -JY     In Chair  notified nsg;sitting;call light within reach;encouraged to call for assist RN aware of no chair alarm engaged and in sitting position at recliner  -JY       User Key  (r) = Recorded By, (t) = Taken By, (c) = Cosigned By    Initials Name Provider Type    Elizabeth Barry OT Occupational Therapist        Outcome Measures     Row Name 10/05/21 0935          How much help from another is currently needed...    Putting on and taking off regular lower body clothing?  4  -JY     Bathing (including washing, rinsing, and drying)  3  -JY     Toileting (which includes using toilet bed pan or urinal)  4  -JY     Putting on and taking off regular upper body clothing  4  -JY     Taking care of personal grooming (such as brushing teeth)  4  -JY     Eating meals  4  -JY     AM-PAC 6 Clicks Score (OT)  23  -JY     Row Name 10/05/21 0935          Functional Assessment    Outcome Measure Options  AM-PAC 6 Clicks Daily Activity (OT)  -JY       User Key  (r) = Recorded By, (t) = Taken By, (c) = Cosigned By    Initials Name Provider Type    Elizabeth Barry OT Occupational Therapist           Occupational Therapy Education                 Title: PT OT SLP Therapies (In Progress)     Topic: Occupational Therapy (In Progress)     Point: ADL training (In Progress)     Description:   Instruct learner(s) on proper safety adaptation and remediation techniques during self care or transfers.   Instruct in proper use of assistive devices.              Learning Progress Summary           Patient Acceptance, E,D, NR by LUL at 10/5/2021 0815                   Point: Home exercise program (In Progress)     Description:   Instruct learner(s) on appropriate technique for monitoring, assisting and/or progressing therapeutic exercises/activities.              Learning Progress Summary           Patient Acceptance, E,D, NR by LUL at 10/5/2021 0815                   Point: Precautions (In Progress)     Description:   Instruct learner(s) on prescribed precautions during self-care and functional transfers.              Learning Progress Summary           Patient Acceptance, E,D, NR by LUL at 10/5/2021 0815                   Point: Body mechanics (In Progress)     Description:   Instruct learner(s) on proper positioning and spine alignment during self-care, functional mobility activities and/or exercises.              Learning Progress Summary           Patient Acceptance, E,D, NR by LUL at 10/5/2021 0815                               User Key     Initials Effective Dates Name Provider Type Discipline    LUL 06/16/21 -  Elizabeth Burton, UMBERTO Occupational Therapist OT              OT Recommendation and Plan  Therapy Frequency (OT): daily  Plan of Care Review  Plan of Care Reviewed With: patient  Progress: improving  Outcome Summary: OT IE completed post chart review. Pt A & O x 4, denies any pain. Pt presents with mild decrease in fxl endurance sonia with more dynamic fxl standing tasks and presents with mild strength deficits at BUEs impacting ADL and related t/f, mob I and safety. Pt received UIC thus no bed mobility assessed,  completed d/d gown, socks, shoes and face/hand washing with I, completed STS at recliner, toilet with SBA and fxl mob in room with SBA to CGA without AD, seated rest break grossly after 4 min. Pt did not report any dizziness or feelinig LH during interventions, did present with lower BP toward end of session, RN notified. Pt would benefit from IPOT POC to address UE strengthening, fxl endurance training prior to recommended d/c home with A and HHOT.     Time Calculation:   Time Calculation- OT     Row Name 10/05/21 0937             Time Calculation- OT    OT Start Time  0815  -JY      OT Received On  10/05/21  -JY      OT Goal Re-Cert Due Date  10/15/21  -JY         Timed Charges    03380 - OT Therapeutic Activity Minutes  5  -JY      25273 - OT Self Care/Mgmt Minutes  6  -JY         Untimed Charges    OT Eval/Re-eval Minutes  48  -JY         Total Minutes    Timed Charges Total Minutes  11  -JY      Untimed Charges Total Minutes  48  -JY       Total Minutes  59  -JY        User Key  (r) = Recorded By, (t) = Taken By, (c) = Cosigned By    Initials Name Provider Type    Elizabeth Barry OT Occupational Therapist        Therapy Charges for Today     Code Description Service Date Service Provider Modifiers Qty    20968795527 HC OT SELF CARE/MGMT/TRAIN EA 15 MIN 10/5/2021 Elizabeth Burton OT GO 1    00254679119 HC OT EVAL LOW COMPLEXITY 4 10/5/2021 Elizabeth Burton OT GO 1               Elizabeth Burton OT  10/5/2021

## 2021-10-05 NOTE — THERAPY EVALUATION
Patient Name: Andry Potter  : 1955    MRN: [id number]                              Today's Date: 10/5/2021       Admit Date: 10/4/2021    Visit Dx:     ICD-10-CM ICD-9-CM   1. Lightheadedness  R42 780.4   2. Generalized weakness  R53.1 780.79   3. History of recent stroke  Z86.73 V12.54   4. LOTTIE (acute kidney injury) (HCC)  N17.9 584.9   5. Orthostatic hypotension  I95.1 458.0   6. Weight loss  R63.4 783.21   7. Weakness  R53.1 780.79     Patient Active Problem List   Diagnosis   • Abnormal stress test   • Type 2 diabetes mellitus (HCC)   • Essential hypertension   • Dyslipidemia   • CAD (coronary artery disease)   • Obesity (BMI 30-39.9)   • Snoring   • Moderate obstructive sleep apnea   • Acute ischemic right MCA stroke, status post TPA and right thrombectomy    • CKD (chronic kidney disease) stage 2, GFR 60-89 ml/min   • Acute ischemic right middle cerebral artery (MCA) stroke (HCC)   • Weakness   • Weight loss     Past Medical History:   Diagnosis Date   • BPH (benign prostatic hyperplasia)    • Diabetes mellitus (HCC)    • Hyperlipidemia    • Hypertension    • Stroke (HCC)      Past Surgical History:   Procedure Laterality Date   • CARDIAC CATHETERIZATION N/A 2020    Procedure: Left Heart Cath 24039;  Surgeon: Bang Walker MD;  Location:  [facility] CATH INVASIVE LOCATION;  Service: Cardiology;  Laterality: N/A;   • COLONOSCOPY     • HAND SURGERY     • HIP BIPOLAR REPLACEMENT     • INTERVENTIONAL RADIOLOGY PROCEDURE N/A 2021    Procedure: IR MECHANICAL THROMBECTOMY - PRIMARY;  Surgeon: Dru Gomes MD;  Location:  [facility] CATH INVASIVE LOCATION;  Service: Interventional Radiology;  Laterality: N/A;     General Information     Row Name 10/05/21 1033          Physical Therapy Time and Intention    Document Type  evaluation  -SS     Mode of Treatment  physical therapy  -SS     Row Name 10/05/21 1033          General Information    Patient Profile Reviewed  yes  -SS     Prior Level of Function   independent:;all household mobility;community mobility;gait;transfer;bed mobility;ADL's;driving;using stairs  -     Existing Precautions/Restrictions  fall recent hx of hypotension, CVA 6/21 (no apparent residual effects)  -     Barriers to Rehab  none identified  -     Row Name 10/05/21 1033          Living Environment    Lives With  alone lives in basement of home with friend(s) upstairs, has friend and sister who are able to assist some  -     Row Name 10/05/21 1033          Home Main Entrance    Number of Stairs, Main Entrance  two  -SS     Stair Railings, Main Entrance  railing on right side (ascending)  -     Row Name 10/05/21 1033          Stairs Within Home, Primary    Number of Stairs, Within Home, Primary  other (see comments) 16  -SS     Stair Railings, Within Home, Primary  railing on right side (ascending)  -     Row Name 10/05/21 1033          Cognition    Orientation Status (Cognition)  oriented x 4  -     Row Name 10/05/21 1033          Safety Issues, Functional Mobility    Safety Issues Affecting Function (Mobility)  awareness of need for assistance;insight into deficits/self-awareness;safety precaution awareness;safety precautions follow-through/compliance  -     Impairments Affecting Function (Mobility)  endurance/activity tolerance  -       User Key  (r) = Recorded By, (t) = Taken By, (c) = Cosigned By    Initials Name Provider Type     Cathleen Estrada, PT Physical Therapist        Mobility     Row Name 10/05/21 1036          Bed Mobility    Comment (Bed Mobility)  pt up in chair  -     Row Name 10/05/21 1036          Transfers    Comment (Transfers)  pt. demonstrates good sequencing and safety awareness  -     Row Name 10/05/21 1036          Sit-Stand Transfer    Sit-Stand [place] (Transfers)  standby assist  -     Assistive Device (Sit-Stand Transfers)  other (see comments) none  -     Row Name 10/05/21 1036          Gait/Stairs (Locomotion)    [place]  Level (Gait)  standby assist;verbal cues  -     Assistive Device (Gait)  other (see comments) none  -SS     Distance in Feet (Gait)  200  -SS     Deviations/Abnormal Patterns (Gait)  bilateral deviations  -SS     Bilateral Gait Deviations  forward flexed posture  -SS     [place] Level (Stairs)  contact guard  -SS     Handrail Location (Stairs)  right side (ascending)  -     Number of Steps (Stairs)  20  -SS     Ascending Technique (Stairs)  step-over-step  -SS     Descending Technique (Stairs)  step-over-step  -SS     Comment (Gait/Stairs)  Pt. ambulated with a step through gait pattern. VC for upright posture/looking ahead. Pt. performed 20 steps w/R ascending handrail. VC for safety recommendations. Gait and stair training limited by fatigue. Pt. asymptomatic  -SS       User Key  (r) = Recorded By, (t) = Taken By, (c) = Cosigned By    Initials Name Provider Type     Cathleen Estrada, ALPHONSE Physical Therapist        Obj/Interventions     Row Name 10/05/21 1038          Range of Motion Comprehensive    General Range of Motion  bilateral lower extremity ROM WFL  -     Row Name 10/05/21 1038          Strength Comprehensive (MMT)    Comment, General Manual Muscle Testing (MMT) Assessment  RLE 4+/5, LLE hip flexors 4+/5, knee flex/ext 4/5, PF/DF 4+/5  -     Row Name 10/05/21 1038          Motor Skills    Motor Skills  functional endurance;coordination  -     Coordination  WFL;gross motor deficit;bilateral;lower extremity;heel to shin  -     Functional Endurance  mild decrease in functional endurance  -     Row Name 10/05/21 1038          Balance    Balance Assessment  sitting static balance;sitting dynamic balance;standing static balance;standing dynamic balance  -     Static Sitting Balance  WFL;unsupported;sitting in chair  -SS     Dynamic Sitting Balance  WFL;unsupported;sitting in chair  -SS     Static Standing Balance  WFL;unsupported  -SS     Dynamic Standing Balance  WFL;unsupported  -SS      Balance Interventions  sitting;standing;sit to stand;supported;static;dynamic  -SS     Row Name 10/05/21 1038          Sensory Assessment (Somatosensory)    Sensory Assessment (Somatosensory)  LE sensation intact  -SS       User Key  (r) = Recorded By, (t) = Taken By, (c) = Cosigned By    Initials Name Provider Type    SS Cathleen Estrada, PT Physical Therapist        Goals/Plan     Row Name 10/05/21 1043          Bed Mobility Goal 1 (PT)    Activity/Assistive Device (Bed Mobility Goal 1, PT)  bed mobility activities, all  -SS     [place] Level/Cues Needed (Bed Mobility Goal 1, PT)  independent  -SS     Time Frame (Bed Mobility Goal 1, PT)  long term goal (LTG);10 days  -SS     Row Name 10/05/21 1043          Transfer Goal 1 (PT)    Activity/Assistive Device (Transfer Goal 1, PT)  sit-to-stand/stand-to-sit;bed-to-chair/chair-to-bed  -SS     [place] Level/Cues Needed (Transfer Goal 1, PT)  independent  -SS     Time Frame (Transfer Goal 1, PT)  long term goal (LTG);10 days  -     Row Name 10/05/21 1043          Gait Training Goal 1 (PT)    Activity/Assistive Device (Gait Training Goal 1, PT)  gait (walking locomotion)  -SS     [place] Level (Gait Training Goal 1, PT)  independent  -SS     Distance (Gait Training Goal 1, PT)  500  -SS     Time Frame (Gait Training Goal 1, PT)  long term goal (LTG);10 days  -     Row Name 10/05/21 1043          Stairs Goal 1 (PT)    Activity/Assistive Device (Stairs Goal 1, PT)  stairs, all skills;ascending stairs;using handrail, right;descending stairs;using handrail, left  -SS     [place] Level/Cues Needed (Stairs Goal 1, PT)  modified independence  -SS     Number of Stairs (Stairs Goal 1, PT)  16  -SS     Time Frame (Stairs Goal 1, PT)  long term goal (LTG);10 days  -SS       User Key  (r) = Recorded By, (t) = Taken By, (c) = Cosigned By    Initials Name Provider Type     Cathleen Estrada, PT Physical Therapist        Clinical Impression     Row Name 10/05/21  1039          Pain    Additional Documentation  Pain Scale: Numbers Pre/Post-Treatment (Group)  -     Row Name 10/05/21 1039          Pain Scale: Numbers Pre/Post-Treatment    Pretreatment Pain Rating  0/10 - no pain  -SS     Posttreatment Pain Rating  0/10 - no pain  -     Pain Intervention(s)  Repositioned;Ambulation/increased activity  -     Row Name 10/05/21 1039          Plan of Care Review    Plan of Care Reviewed With  patient  -SS     Outcome Summary  PT eval complete. Pt. performed sit to stand transfer w/stand by assist. He ambulated 200' w/no assistive device, stand by assist. He performed 20 steps w/R ascending handrail, contact guard assist. Gait and stair training limited by fatigue. Pt. asymptomatic w/functional mobility, vitals recorded. Recommend home with assist and home health PT to address endurance training.  -     Row Name 10/05/21 1039          Therapy Assessment/Plan (PT)    Rehab Potential (PT)  good, to achieve stated therapy goals  -     Criteria for Skilled Interventions Met (PT)  yes;meets criteria;skilled treatment is necessary  -     Row Name 10/05/21 1039          Vital Signs    Pre Systolic BP Rehab  96  -SS     Pre Treatment Diastolic BP  65  -SS     Post Systolic BP Rehab  112  -SS     Post Treatment Diastolic BP  60  -SS     Pretreatment Heart Rate (beats/min)  67  -SS     Intratreatment Heart Rate (beats/min)  99  -SS     Posttreatment Heart Rate (beats/min)  77  -SS     Pre SpO2 (%)  97  -SS     O2 Delivery Pre Treatment  room air  -SS     Post SpO2 (%)  99  -SS     O2 Delivery Post Treatment  room air  -SS     Pre Patient Position  Sitting  -     Intra Patient Position  Standing  -SS     Post Patient Position  Sitting  -     Row Name 10/05/21 1039          Positioning and Restraints    Pre-Treatment Position  sitting in chair/recliner  -SS     Post Treatment Position  chair  -SS     In Chair  notified nsg;sitting;call light within reach;encouraged to call for  assist RN aware no alarm  -       User Key  (r) = Recorded By, (t) = Taken By, (c) = Cosigned By    Initials Name Provider Type    Cathleen Shelton PT Physical Therapist        Outcome Measures     Row Name 10/05/21 1044          How much help from another person do you currently need...    Turning from your back to your side while in flat bed without using bedrails?  3  -SS     Moving from lying on back to sitting on the side of a flat bed without bedrails?  3  -SS     Moving to and from a bed to a chair (including a wheelchair)?  3  -SS     Standing up from a chair using your arms (e.g., wheelchair, bedside chair)?  3  -SS     Climbing 3-5 steps with a railing?  3  -SS     To walk in hospital room?  3  -SS     AM-PAC 6 Clicks Score (PT)  18  -SS     Row Name 10/05/21 1044 10/05/21 0935       Functional Assessment    Outcome Measure Options  AM-PAC 6 Clicks Basic Mobility (PT)  -  AM-PAC 6 Clicks Daily Activity (OT)  -LUL      User Key  (r) = Recorded By, (t) = Taken By, (c) = Cosigned By    Initials Name Provider Type    Elizabeth Barry OT Occupational Therapist    Cathleen Shelton PT Physical Therapist                       Physical Therapy Education                 Title: PT OT SLP Therapies (In Progress)     Topic: Physical Therapy (Done)     Point: Mobility training (Done)     Learning Progress Summary           Patient Acceptance, E, VU,NR by  at 10/5/2021 1044    Comment: Educated pt. safety/technique with ambulation, stair navigation, PT POC                   Point: Home exercise program (Done)     Learning Progress Summary           Patient Acceptance, E, VU,NR by  at 10/5/2021 1044    Comment: Educated pt. safety/technique with ambulation, stair navigation, PT POC                   Point: Body mechanics (Done)     Learning Progress Summary           Patient Acceptance, E, VU,NR by  at 10/5/2021 1044    Comment: Educated pt. safety/technique with ambulation, stair navigation, PT POC                    Point: Precautions (Done)     Learning Progress Summary           Patient Acceptance, E, VU,NR by  at 10/5/2021 1044    Comment: Educated pt. safety/technique with ambulation, stair navigation, PT POC                               User Key     Initials Effective Dates Name Provider Type Discipline     06/01/21 -  Cathleen Estrada PT Physical Therapist PT              PT Recommendation and Plan  Planned Therapy Interventions (PT): balance training, bed mobility training, gait training, home exercise program, neuromuscular re-education, patient/family education, postural re-education, stair training, strengthening, transfer training  Plan of Care Reviewed With: patient  Outcome Summary: PT eval complete. Pt. performed sit to stand transfer w/stand by assist. He ambulated 200' w/no assistive device, stand by assist. He performed 20 steps w/R ascending handrail, contact guard assist. Gait and stair training limited by fatigue. Pt. asymptomatic w/functional mobility, vitals recorded. Recommend home with assist and home health PT to address endurance training.     Time Calculation:   PT Charges     Row Name 10/05/21 1046             Time Calculation    Start Time  0907  -      Stop Time  0922  -SS      Time Calculation (min)  15 min  -SS      PT Received On  10/05/21  -      PT Goal Re-Cert Due Date  10/15/21  -         Untimed Charges    PT Eval/Re-eval Minutes  50  -SS         Total Minutes    Untimed Charges Total Minutes  50  -SS       Total Minutes  50  -SS        User Key  (r) = Recorded By, (t) = Taken By, (c) = Cosigned By    Initials Name Provider Type     Cathleen Estrada PT Physical Therapist        Therapy Charges for Today     Code Description Service Date Service Provider Modifiers Qty    91550099203 HC PT EVAL LOW COMPLEXITY 4 10/5/2021 Cathleen Estrada, PT GP 1          PT G-Codes  Outcome Measure Options: AM-PAC 6 Clicks Basic Mobility (PT)  AM-PAC 6 Clicks Score (PT): 18  AM-PAC 6  Clicks Score (OT): 23    Cathleen Estrada, PT  10/5/2021

## 2021-10-05 NOTE — PLAN OF CARE
Goal Outcome Evaluation:           Progress: improving  Outcome Summary: VSS, no complaints of pain. No S/S of nausea or vomiting. GI consulted, NPO after MN for EGD. Mag replaced CT of ABD/PELVIS obtained. AxO, RA, NSR, up ad shelley. Continue to [facility].

## 2021-10-05 NOTE — CONSULTS
Clinical Nutrition   Reason For Visit: MST score 2+, Unintentional weight loss    Patient Name: Andry Potter  YOB: 1955  MRN: [id number]  Date of Encounter: 10/05/21 10:47 EDT  Admission date: 10/4/2021    Comments:   - RD ordering Ensure HP TID.    - Pt meets criteria for moderate malnutrition in the context of chronic illness as evidenced by 40 lbs unintentional wt loss over 9 months (17%) and inadequate energy intake (<75% of estimated energy requirement for > 1 month). See MSA note.    Nutrition Assessment     Admission Problem List:    Weakness    Weight loss     Applicable Nutrition-Related Information:  - noted depression symptoms secondary to the loss of his father just two weeks after his stroke    Applicable medical tests/procedures since admission:  10/5: GI following; EGD tomorrow.     PMH: He  has a past medical history of BPH (benign prostatic hyperplasia), Diabetes mellitus (HCC), Hyperlipidemia, Hypertension, and Stroke (HCC).   PSxH: He  has a past surgical history that includes Hip Bipolar; Hand surgery; Colonoscopy; Cardiac catheterization (N/A, 9/9/2020); and Interventional radiology procedure (N/A, 6/29/2021).     Reported/Observed/Food/Nutrition Related History     10/5: Pt sitting in chair during RD visit. Pt reported poor appetite now and PTA since July. Pt reported UBW of 235 lbs in July 2021 with 40 lbs unintentional wt loss since then. Pt reported symptoms of N/V and abdominal pain over the last few months. No chewing or swallowing issues. NKFA. Pt reported he drinks 4-6 Ensure ONS daily. Pt reported he has only been able to tolerate eggs and milk. Pt reports taste changes and food tasting too sweet. RD discussed menu options and encouraged intake. Pt prefers vanilla/strawberry ONS and agreeable to Ensure on trays.    Anthropometrics   Height: 70 in  Weight: 196 lbs (bed scale on 10/4)  BMI: 28.12  BMI classification: Overweight: 25.0-29.9kg/m2    IBW: 166 lbs  UBW: 225-230  lbs per EMR  Weight change: 40 lbs unintentional wt loss over 9 months (17%).    Weight Weight (kg) Weight (lbs) Weight Method   10/4/2021 88.905 kg 196 lb Stated   10/4/2021 89.086 kg 196 lb 6.4 oz -   10/4/2021 89.359 kg 197 lb -   9/30/2021 87.091 kg 192 lb -   9/21/2021 92.08 kg 203 lb -   6/29/2021 102.059 kg 225 lb Stated   1/29/2021 107.14 kg 236 lb 3.2 oz -   11/5/2020 102.785 kg 226 lb 9.6 oz -   10/7/2020 116.121 kg 256 lb -   9/10/2020 102.513 kg 226 lb -   9/9/2020 102.286 kg 225 lb 8 oz Standing scale   3/9/2019 107.956 kg 238 lb -   2/18/2019 106.595 kg 235 lb Stated     Nutrition Focused Physical Exam - 10/5     Subcutaneous fat:  Orbital No fat loss identified   Buccal No fat loss identified   Tricep Mild   Ribs- thoracic and lumbar region, lower back, midaxillary line Unable to determine at this time     Muscle:  Temple/temporalis Mild   Clavicle/acromion- pectoralis, deltoid Mild   Shoulder- deltoid Mild   Scapula- trapezius, latissimus dorsi Unable to determine at this time   Interosseous- dorsal hand Unable to determine at this time   Thigh- quadriceps No muscle loss identified   Calf- gastrocnemius No muscle loss identified     Labs reviewed   Labs reviewed: Yes    Results from last 7 days   Lab Units 10/05/21  0853 10/04/21  1526 10/01/21  0718   SODIUM mmol/L 136 137 137   POTASSIUM mmol/L 4.9 5.1 5.1   CHLORIDE mmol/L 104 104 103   CO2 mmol/L 18.0* 19.0* 20.9*   BUN mg/dL 33* 38* 40*   CREATININE mg/dL 1.67* 1.92* 1.97*   GLUCOSE mg/dL 125* 93 94   CALCIUM mg/dL 9.1 9.8 10.0   PHOSPHORUS mg/dL 2.8  --   --    MAGNESIUM mg/dL  --  1.7  --    CHOLESTEROL mg/dL  --   --  122   TRIGLYCERIDES mg/dL  --   --  158*     Results from last 7 days   Lab Units 10/05/21  0853 10/04/21  1526 10/01/21  0718   WBC 10*3/mm3 7.69 10.10 10.63   ALBUMIN g/dL 4.00 4.30 4.40   CHOLESTEROL mg/dL  --   --  122   TRIGLYCERIDES mg/dL  --   --  158*     Results from last 7 days   Lab Units 10/05/21  0800 10/04/21  1946  10/04/21  1530   GLUCOSE mg/dL 103 95 102     Lab Results   Lab Value Date/Time    HGBA1C 6.10 (H) 10/01/2021 0718    HGBA1C 6.20 (H) 06/30/2021 0410     Medications reviewed   Medications reviewed: Yes  Pertinent: insulin  GTT: NS at 100 ml/hr    Current Nutrition Prescription   PO: Diet Regular; Cardiac, Consistent Carbohydrate  NPO Diet  Oral Nutrition Supplement: No active supplement orders  Average PO intake: insufficient data    Nutrition Diagnosis   10/5  Problem Malnutrition    Etiology Energy needs > energy intake   Signs/Symptoms 40 lbs unintentional wt loss over 9 months (17%); inadequate energy intake (<75% of estimated energy requirement for > 1 month).     Intervention   Intervention: Follow treatment progress, Care plan reviewed    - RD ordering Ensure HP TID.    Goal:   General: Nutrition support treatment  PO: Increase intake     Additional goals: No further weight loss    Monitoring/Evaluation:   Monitoring/Evaluation: Per protocol, PO intake, Pertinent labs, Weight, GI status, Symptoms, POC/GOC    Chilango Centeno RD  Time Spent: 30 min

## 2021-10-05 NOTE — PROGRESS NOTES
[facility] Medicine Services  PROGRESS NOTE    Patient Name: Andry Potter  : 1955  MRN: [id number]    Date of Admission: 10/4/2021  Primary Care Physician: Meghan Watt APRN    Subjective   Subjective     CC: f/u FTT    HPI: Up in chair. Feels a little better. Still doesn't feel like eating/drinking.    ROS:  Gen- No fevers, chills  CV- No chest pain, palpitations  Resp- No cough, dyspnea  GI- No N/V/D, abd pain     Objective   Objective     Vital Signs:   Temp:  [97.6 °F (36.4 °C)-97.9 °F (36.6 °C)] 97.7 °F (36.5 °C)  Heart Rate:  [] 71  Resp:  [16-18] 18  BP: ()/(40-88) 116/64     Physical Exam:  Constitutional: No acute distress, awake, alert  HENT: NCAT, mucous membranes moist  Respiratory: Clear to auscultation bilaterally, respiratory effort normal   Cardiovascular: RRR, no murmurs, rubs, or gallops  Gastrointestinal: Positive bowel sounds, soft, nontender, nondistended  Musculoskeletal: No bilateral ankle edema  Psychiatric: Depressed  Neurologic: Oriented x 3, strength symmetric in all extremities, Cranial Nerves grossly intact to confrontation, speech clear  Skin: No rashes    Results Reviewed:  LAB RESULTS:      Lab 10/05/21  0853 10/04/21  1526 10/01/21  0718   WBC 7.69 10.10 10.63   HEMOGLOBIN 10.3* 11.1* 11.4*   HEMATOCRIT 30.4* 32.7* 34.4*   PLATELETS 206 235 261   NEUTROS ABS 3.54 4.47 4.25   IMMATURE GRANS (ABS) 0.02 0.03 0.03   LYMPHS ABS 3.44* 4.62* 5.35*   MONOS ABS 0.50 0.77 0.76   EOS ABS 0.15 0.15 0.18   MCV 94.7 94.0 96.6         Lab 10/05/21  0853 10/04/21  1526 10/01/21  0718   SODIUM 136 137 137   POTASSIUM 4.9 5.1 5.1   CHLORIDE 104 104 103   CO2 18.0* 19.0* 20.9*   ANION GAP 14.0 14.0 13.1   BUN 33* 38* 40*   CREATININE 1.67* 1.92* 1.97*   GLUCOSE 125* 93 94   CALCIUM 9.1 9.8 10.0   MAGNESIUM 1.5* 1.7  --    PHOSPHORUS 2.8  --   --    HEMOGLOBIN A1C  --   --  6.10*   TSH 1.210  --   --          Lab 10/05/21  0853 10/04/21  1525  10/01/21  0718   TOTAL PROTEIN 6.6 7.2 7.2   ALBUMIN 4.00 4.30 4.40   GLOBULIN 2.6 2.9 2.8   ALT (SGPT) 56* 55* 37   AST (SGOT) 52* 49* 38   BILIRUBIN 0.4 0.3 0.4   ALK PHOS 118* 125* 109         Lab 10/04/21  1526   TROPONIN T 0.011         Lab 10/01/21  0718   CHOLESTEROL 122   LDL CHOL 64   HDL CHOL 31*   TRIGLYCERIDES 158*         Lab 10/05/21  0853   IRON 50*   IRON SATURATION 17*   TIBC 292*   TRANSFERRIN 196*         Brief Urine Lab Results  (Last result in the past 365 days)      Color   Clarity   Blood   Leuk Est   Nitrite   Protein   CREAT   Urine HCG        10/04/21 1617 Yellow Clear Negative Small (1+) Negative 100 mg/dL (2+)               Microbiology Results Abnormal     Procedure Component Value - Date/Time    COVID PRE-OP / PRE-PROCEDURE SCREENING ORDER (NO ISOLATION) - Swab, Nasopharynx [[id number]]  (Normal) Collected: 10/04/21 1715    Lab Status: Final result Specimen: Swab from Nasopharynx Updated: 10/04/21 1751    Narrative:      The following orders were created for panel order COVID PRE-OP / PRE-PROCEDURE SCREENING ORDER (NO ISOLATION) - Swab, Nasopharynx.  Procedure                               Abnormality         Status                     ---------                               -----------         ------                     COVID-19 and FLU A/B PCR...[[id number]]  Normal              Final result                 Please view results for these tests on the individual orders.    COVID-19 and FLU A/B PCR - Swab, Nasopharynx [[id number]]  (Normal) Collected: 10/04/21 1715    Lab Status: Final result Specimen: Swab from Nasopharynx Updated: 10/04/21 1751     COVID19 Not Detected     Influenza A PCR Not Detected     Influenza B PCR Not Detected    Narrative:      Fact sheet for providers: https://www.fda.gov/media/592148/download    Fact sheet for patients: https://www.fda.gov/media/727611/download    Test performed by PCR.          CT Abdomen Pelvis Without Contrast    Result Date:  10/5/2021  EXAMINATION: CT ABDOMEN PELVIS WO CONTRAST- 10/05/2021  INDICATION: Nausea/vomiting; R42-Dizziness and giddiness; R53.1-Weakness; Z86.73-Personal history of transient ischemic attack (TIA), and cerebral infarction without residual deficits; N17.9-Acute kidney failure, unspecified; I95.1-Orthostatic hypotension; R63.4-Abnormal weight loss; R53.1-Weakness  TECHNIQUE: 5 mm unenhanced images through the abdomen and pelvis.  The radiation dose reduction device was turned on for each scan per the ALARA (As Low as Reasonably Achievable) protocol.  COMPARISON: NONE  FINDINGS: History indicates nausea, vomiting, unexplained weight loss, generalized weakness.  The included lung bases appear grossly clear. There is a water density 12 mm left lobe liver cyst. In addition, there is a heterogeneous appearance of the liver parenchyma, particularly in segments 4A, 4B, segment 7 and segment 8. This is potentially due to irregular fatty liver change, but the possibility of metastatic disease is a consideration as well. Liver capsule appears slightly irregular. Gallbladder is decompressed and otherwise unremarkable in appearance. A few granulomatous calcifications are seen in the otherwise normal-appearing spleen. No significant abnormalities are noted of the pancreas, adrenal glands, or kidneys for a non-IV contrast enhanced exam. No upper abdominal adenopathy, ascites, or acute inflammatory change is seen.  Regarding the lower abdomen and pelvis, no ascites, adenopathy or acute inflammatory change is seen. There is streak artifact from patient's right hip prosthesis. Review of the colon shows at least no gross evidence of mass or inflammation. Bladder is obscured by streak artifact but apparently nondistended. Terminal ileum cecum and appendix appear grossly normal. Bony structures appear to be intact.      Impression: 1. Small water density left lobe liver cyst, and also multiple other areas of heterogeneous liver  parenchyma of uncertain significance. Unusual pattern of fatty liver change, and infiltration by tumor are considerations. Correlation with patient's serum liver chemistries is needed. Follow-up with contrast-enhanced liver protocol CT scan or liver MRI is suggested for better characterization of disease.  2. No evidence of acute inflammatory process, bowel obstruction or other clearly acute intra-abdominal or intrapelvic disease elsewhere.  D: 10/05/2021 E: 10/05/2021      XR Chest 1 View    Result Date: 10/5/2021  EXAMINATION: XR CHEST 1 VW-  INDICATION: Weak, dizzy, AMS triage protocol.  COMPARISON: 06/29/2021.  FINDINGS: Heart, mediastinum and pulmonary vasculature appear within normal limits. Mild coarsening of the pulmonary interstitial markings appears stable from the prior exam, apparently chronic. No new pulmonary parenchymal disease, evidence of edema, effusion or pneumothorax is seen.      Impression: No new chest disease.   D:  10/04/2021 E:  10/05/2021            I have reviewed the medications:  Scheduled Meds:escitalopram, 5 mg, Oral, Daily  insulin lispro, 0-7 Units, Subcutaneous, TID AC  metoclopramide, 5 mg, Oral, 4x Daily AC & at Bedtime  mirtazapine, 15 mg, Oral, Nightly  nystatin, 5 mL, Swish & Swallow, 4x Daily  pantoprazole, 40 mg, Oral, Q AM  rosuvastatin, 40 mg, Oral, Daily  sodium chloride, 10 mL, Intravenous, Q12H      Continuous Infusions:sodium chloride, 100 mL/hr, Last Rate: 100 mL/hr (10/05/21 0625)      PRN Meds:.•  dextrose  •  dextrose  •  diphenhydrAMINE  •  glucagon (human recombinant)  •  magnesium sulfate **OR** magnesium sulfate **OR** magnesium sulfate  •  ondansetron  •  sodium chloride    Assessment/Plan   Assessment & Plan     Active Hospital Problems    Diagnosis  POA   • Moderate malnutrition (CMS/HCC) [E44.0]  Yes   • LOTTIE (acute kidney injury) (HCC) [N17.9]  Yes   • Abnormal CT of the abdomen [R93.5]  Yes   • Weakness [R53.1]  Yes   • Weight loss [R63.4]  Yes   • CKD  (chronic kidney disease) stage 2, GFR 60-89 ml/min [N18.2]  Yes      Resolved Hospital Problems   No resolved problems to display.        Brief Hospital Course to date:  Andry Potter is a 66 y.o. male male with past medical history of CVA (right MCA occlusion 6/21 status post thrombectomy), atrial fibrillation, hypertension, hyperlipidemia, BPH  who presents to  today for weakness. This is my first day assessing patient's active medical issues.    Hypotension  -Suspect due to being overmedicated related to recent 40 pound weight loss. Resolved after IV fluids. Continue to hold antihypertensives.    Abnormal CT  Elevated liver enzymes.  -Has abnormal appearing liver on CT A/P. Will plan to perform CT liver protcol w/ and w/out contrast in next 24-48 hours once his creatinine has returned to baseline.   -Check AFP, CEA, CA 19-9     Nausea/Vomiting  Anorexia  --GI following, planning for EGD in am.  --Unclear if related to above vs other organic causes. He is definitely depressed and has noted no difference since initiation of Lexapro. Agree with GI, will go ahead and start Remeron.   --Start Reglan.     Atrial fibrillation  -Hold AC in event biopsy needed.     HLD  -Rousvastatin     DM  -SSI    Thrush  - Nystatin swish and swallow    This patient's problems and plans were partially entered by my partner and updated as appropriate by me 10/05/21.     DVT prophylaxis:  Mechanical    AM-PAC 6 Clicks Score (PT): 18 (10/05/21 1044)    Disposition: I expect the patient to be discharged TBD.    CODE STATUS:   Code Status and Medical Interventions:   Ordered at: 10/04/21 2101     Level Of Support Discussed With:    Patient     Code Status:    CPR     Medical Interventions (Level of Support Prior to Arrest):    Sanchez Gamino II, DO  10/05/21

## 2021-10-05 NOTE — PLAN OF CARE
Problem: Adult Inpatient Plan of Care  Goal: Plan of Care Review  Recent Flowsheet Documentation  Taken 10/5/2021 5858 by Elizabeth Burton OT  Progress: improving  Plan of Care Reviewed With: patient  Outcome Summary: OT IE completed post chart review. Pt A & O x 4, denies any pain. Pt presents with mild decrease in fxl endurance sonia with more dynamic fxl standing tasks and presents with mild strength deficits at BUEs impacting ADL and related t/f, mob I and safety. Pt received UIC thus no bed mobility assessed, completed d/d gown, socks, shoes and face/hand washing with I, completed STS at recliner, toilet with SBA and fxl mob in room with SBA to CGA without AD, seated rest break grossly after 4 min. Pt did not report any dizziness or feelinig LH during interventions, did present with lower BP toward end of session, RN notified. Pt would benefit from IPOT POC to address UE strengthening, fxl endurance training prior to recommended d/c home with A and HHOT.

## 2021-10-05 NOTE — PROGRESS NOTES
Malnutrition Severity Assessment    Patient Name:  Andry Potter  YOB: 1955  MRN: [id number]  Admit Date:  10/4/2021    Patient meets criteria for : Moderate (non-severe) Malnutrition (Pt meets criteria for moderate malnutrition in the context of chronic illness as evidenced by 40 lbs unintentional wt loss over 9 months (17%) and inadequate energy intake (<75% of estimated energy requirement for > 1 month).)    Malnutrition Severity Assessment  Malnutrition Type: Chronic Disease - Related Malnutrition     Malnutrition Type (last 8 hours)      Malnutrition Severity Assessment     Row Name 10/05/21 1231       Malnutrition Severity Assessment    Malnutrition Type  Chronic Disease - Related Malnutrition    Row Name 10/05/21 1231       Insufficient Energy Intake     Insufficient Energy Intake Findings  Moderate    Insufficient Energy Intake   <75% of est. energy requirement for > or equal to 1 month    Row Name 10/05/21 1231       Unintentional Weight Loss     Unintentional Weight Loss Findings  Severe 17% in 9 months    Row Name 10/05/21 1231       Criteria Met (Must meet criteria for severity in at least 2 of these categories: M Wasting, Fat Loss, Fluid, Secondary Signs, Wt. Status, Intake)    Patient meets criteria for   Moderate (non-severe) Malnutrition Pt meets criteria for moderate malnutrition in the context of chronic illness as evidenced by 40 lbs unintentional wt loss over 9 months (17%) and inadequate energy intake (<75% of estimated energy requirement for > 1 month).          Electronically signed by:  Chilango Centeno RD  10/05/21 12:34 EDT

## 2021-10-05 NOTE — CONSULTS
[facility] [place] Gastroenterology Consult    Referring Provider:  Kenzie Ibarra DO   PCP: Meghan Watt APRN    Reason for Consultation: Nausea, vomiting and unintentional weight loss     Chief complaint: Weakness     History of present illness:    Andry Potter is a 66 y.o. male who is admitted with weakness.  He has a stroke in June 2021 and describes poor appetite since that time.  He has unintentionally lost 40 lbs.  He has occasional nausea and vomiting.  He denies any dysphagia, odynophagia nor abdominal pain.  He does admit to depression symptoms secondary to the loss of his father just two weeks after his stroke.      He was found to be hypotensive and labs showed acute kidney injury with Cr of 1.97.  He has mildly elevated LFTs.     Allergies:  Patient has no known allergies.    Scheduled Meds:  escitalopram, 5 mg, Oral, Daily  insulin lispro, 0-7 Units, Subcutaneous, TID AC  nystatin, 5 mL, Swish & Swallow, 4x Daily  rosuvastatin, 40 mg, Oral, Daily  sodium chloride, 10 mL, Intravenous, Q12H         Infusions:  sodium chloride, 100 mL/hr, Last Rate: 100 mL/hr (10/05/21 0625)        PRN Meds:  •  dextrose  •  dextrose  •  diphenhydrAMINE  •  glucagon (human recombinant)  •  ondansetron  •  sodium chloride    Home Meds:  Medications Prior to Admission   Medication Sig Dispense Refill Last Dose   • aspirin 81 MG EC tablet Take 1 tablet by mouth Daily. 90 tablet 0    • escitalopram (Lexapro) 5 MG tablet Take 1 tablet by mouth Daily. 30 tablet 0    • glucose blood test strip Use to monitor glucose daily as instructed 100 each 3    • glucose monitor monitoring kit Use to monitor glucose daily 1 each 0    • Lancets (onetouch ultrasoft) lancets Use to monitor glucose once daily 100 each 3    • Niacin (VITAMIN B-3 PO) Take 1 tablet by mouth Daily.      • rivaroxaban (Xarelto) 10 MG tablet Take 10 mg by mouth Daily.      • rosuvastatin (Crestor) 40 MG tablet Take 1 tablet by mouth Daily. 30 tablet 0    • SITagliptin  (Januvia) 50 MG tablet Take 1 tablet by mouth Daily. 30 tablet 5        ROS: Review of Systems   Constitutional: Positive for fatigue and unexpected weight change.        40 lb weight loss    HENT: Negative for trouble swallowing.    Eyes: Negative.    Respiratory: Negative.    Cardiovascular: Negative.    Gastrointestinal: Positive for nausea and vomiting.   Endocrine: Negative.    Genitourinary: Negative.    Musculoskeletal: Negative.    Skin: Negative.    Allergic/Immunologic: Negative.    Neurological: Positive for dizziness, weakness and light-headedness.   Hematological: Negative.    Psychiatric/Behavioral: Positive for dysphoric mood.       PAST MED HX:  Past Medical History:   Diagnosis Date   • BPH (benign prostatic hyperplasia)    • Diabetes mellitus (HCC)    • Hyperlipidemia    • Hypertension    • Stroke (HCC)        PAST SURG HX:  Past Surgical History:   Procedure Laterality Date   • CARDIAC CATHETERIZATION N/A 9/9/2020    Procedure: Left Heart Cath 51555;  Surgeon: Bang Walker MD;  Location:  [facility];  Service: Cardiology;  Laterality: N/A;   • COLONOSCOPY     • HAND SURGERY     • HIP BIPOLAR REPLACEMENT     • INTERVENTIONAL RADIOLOGY PROCEDURE N/A 6/29/2021    Procedure: IR MECHANICAL THROMBECTOMY - PRIMARY;  Surgeon: Dru Gomes MD;  Location:  [facility];  Service: Interventional Radiology;  Laterality: N/A;       FAM HX:  Family History   Problem Relation Age of Onset   • Diabetes Mother        SOC HX:  Social History     Socioeconomic History   • Marital status: Single     Spouse name: Not on file   • Number of children: Not on file   • Years of education: Not on file   • Highest education level: Not on file   Tobacco Use   • Smoking status: Former Smoker     Types: Cigarettes   • Smokeless tobacco: Never Used   • Tobacco comment: quit 30 years ago   Vaping Use   • Vaping Use: Never used   Substance and Sexual Activity   • Alcohol use: Yes      "Comment: once a month   • Drug use: Never   • Sexual activity: Defer       PHYSICAL EXAM  /73 (BP Location: Right arm, Patient Position: Lying)   Pulse 71   Temp 97.9 °F (36.6 °C) (Oral)   Resp 18   Ht 177.8 cm (70\")   Wt 88.9 kg (196 lb)   SpO2 94%   BMI 28.12 kg/m²   Wt Readings from Last 3 Encounters:   10/04/21 88.9 kg (196 lb)   10/04/21 89.1 kg (196 lb 6.4 oz)   10/04/21 89.4 kg (197 lb)   ,body mass index is 28.12 kg/m².  Physical Exam  Constitutional:       General: He is not in acute distress.     Appearance: He is not ill-appearing.   HENT:      Head: Normocephalic and atraumatic.      Mouth/Throat:      Mouth: Mucous membranes are moist.   Eyes:      General: No scleral icterus.  Cardiovascular:      Rate and Rhythm: Normal rate and regular rhythm.   Pulmonary:      Effort: Pulmonary effort is normal.      Breath sounds: Normal breath sounds.   Abdominal:      General: Bowel sounds are normal. There is no distension.      Palpations: Abdomen is soft.      Tenderness: There is no abdominal tenderness.   Skin:     General: Skin is warm and dry.   Neurological:      Mental Status: He is alert and oriented to person, place, and time.   Psychiatric:         Behavior: Behavior normal.         Thought Content: Thought content normal.       Results Review:   I reviewed the patient's new clinical results.    Lab Results   Component Value Date    WBC 10.10 10/04/2021    HGB 11.1 (L) 10/04/2021    HGB 11.4 (L) 10/01/2021    HGB 11.5 (L) 07/01/2021    HCT 32.7 (L) 10/04/2021    MCV 94.0 10/04/2021     10/04/2021       Lab Results   Component Value Date    INR 1.0 06/29/2021     Lab Results   Component Value Date    GLUCOSE 93 10/04/2021    BUN 38 (H) 10/04/2021    CREATININE 1.92 (H) 10/04/2021    EGFRIFNONA 35 (L) 10/04/2021    BCR 19.8 10/04/2021     10/04/2021    K 5.1 10/04/2021    CO2 19.0 (L) 10/04/2021    CALCIUM 9.8 10/04/2021    ALBUMIN 4.30 10/04/2021    ALKPHOS 125 (H) 10/04/2021 "    BILITOT 0.3 10/04/2021    ALT 55 (H) 10/04/2021    AST 49 (H) 10/04/2021      Ref. Range 10/4/2021 15:26   Hep A IgM Latest Ref Range: Non-Reactive  Non-Reactive   Hepatitis B Surface Ag Latest Ref Range: Non-Reactive  Non-Reactive   Hep B Core IgM Latest Ref Range: Non-Reactive  Non-Reactive   Hepatitis C Ab Latest Ref Range: Non-Reactive  Non-Reactive     ASSESSMENTS/PLANS    1. Unintentional weight loss  2. Nausea with vomiting  3. Poor appetite, anorexia   4. Depression  5. Acute kidney injury  6. Hypotension   7. Oral thrush   8. Recent stroke, June 2020     >>> Agree with noncontrast CT Abdomen/Pelvis  >>> Recommend EGD tomorrow.  Patient ate breakfast this morning.    >>> Once daily Protonix  >>> Differential of etiology of his anorexia would include depression.   Will consider initiating Remeron pending results of above.     I discussed the patient's findings and my recommendations with patient    LAISHA Yepez  10/05/21  08:49 EDT

## 2021-10-05 NOTE — PLAN OF CARE
Goal Outcome Evaluation:  Plan of Care Reviewed With: patient           Outcome Summary: PT eval complete. Pt. performed sit to stand transfer w/stand by assist. He ambulated 200' w/no assistive device, stand by assist. He performed 20 steps w/R ascending handrail, contact guard assist. Gait and stair training limited by fatigue. Pt. asymptomatic w/functional mobility, vitals recorded. Recommend home with assist and home health PT to address endurance training.

## 2021-10-06 ENCOUNTER — ANESTHESIA (OUTPATIENT)
Dept: GASTROENTEROLOGY | Facility: HOSPITAL | Age: 66
End: 2021-10-06

## 2021-10-06 ENCOUNTER — APPOINTMENT (OUTPATIENT)
Dept: CT IMAGING | Facility: HOSPITAL | Age: 66
End: 2021-10-06

## 2021-10-06 ENCOUNTER — READMISSION MANAGEMENT (OUTPATIENT)
Dept: CALL CENTER | Facility: HOSPITAL | Age: 66
End: 2021-10-06

## 2021-10-06 ENCOUNTER — TELEPHONE (OUTPATIENT)
Dept: ONCOLOGY | Facility: CLINIC | Age: 66
End: 2021-10-06

## 2021-10-06 VITALS
RESPIRATION RATE: 20 BRPM | WEIGHT: 199 LBS | SYSTOLIC BLOOD PRESSURE: 112 MMHG | DIASTOLIC BLOOD PRESSURE: 63 MMHG | HEART RATE: 63 BPM | TEMPERATURE: 98 F | OXYGEN SATURATION: 97 % | HEIGHT: 70 IN | BODY MASS INDEX: 28.49 KG/M2

## 2021-10-06 PROBLEM — N17.9 AKI (ACUTE KIDNEY INJURY) (HCC): Status: RESOLVED | Noted: 2021-10-05 | Resolved: 2021-10-06

## 2021-10-06 PROBLEM — R53.1 WEAKNESS: Status: RESOLVED | Noted: 2021-10-04 | Resolved: 2021-10-06

## 2021-10-06 PROBLEM — K22.89 ESOPHAGEAL MASS: Status: ACTIVE | Noted: 2021-10-06

## 2021-10-06 PROBLEM — R63.4 WEIGHT LOSS: Status: RESOLVED | Noted: 2021-10-04 | Resolved: 2021-10-06

## 2021-10-06 LAB
ALBUMIN SERPL-MCNC: 3.7 G/DL (ref 3.5–5.2)
ALBUMIN/GLOB SERPL: 1.2 G/DL
ALP SERPL-CCNC: 131 U/L (ref 39–117)
ALT SERPL W P-5'-P-CCNC: 75 U/L (ref 1–41)
ANION GAP SERPL CALCULATED.3IONS-SCNC: 12 MMOL/L (ref 5–15)
AST SERPL-CCNC: 77 U/L (ref 1–40)
BILIRUB SERPL-MCNC: 0.3 MG/DL (ref 0–1.2)
BUN SERPL-MCNC: 24 MG/DL (ref 8–23)
BUN/CREAT SERPL: 18.2 (ref 7–25)
CALCIUM SPEC-SCNC: 9.1 MG/DL (ref 8.6–10.5)
CANCER AG19-9 SERPL-ACNC: 1829 U/ML
CHLORIDE SERPL-SCNC: 106 MMOL/L (ref 98–107)
CO2 SERPL-SCNC: 19 MMOL/L (ref 22–29)
CREAT SERPL-MCNC: 1.32 MG/DL (ref 0.76–1.27)
GFR SERPL CREATININE-BSD FRML MDRD: 54 ML/MIN/1.73
GLOBULIN UR ELPH-MCNC: 3.1 GM/DL
GLUCOSE BLDC GLUCOMTR-MCNC: 182 MG/DL (ref 70–130)
GLUCOSE BLDC GLUCOMTR-MCNC: 94 MG/DL (ref 70–130)
GLUCOSE SERPL-MCNC: 88 MG/DL (ref 65–99)
POTASSIUM SERPL-SCNC: 4.5 MMOL/L (ref 3.5–5.2)
PROT SERPL-MCNC: 6.8 G/DL (ref 6–8.5)
SODIUM SERPL-SCNC: 137 MMOL/L (ref 136–145)

## 2021-10-06 PROCEDURE — G0378 HOSPITAL OBSERVATION PER HR: HCPCS

## 2021-10-06 PROCEDURE — 88305 TISSUE EXAM BY PATHOLOGIST: CPT | Performed by: INTERNAL MEDICINE

## 2021-10-06 PROCEDURE — 63710000001 ESCITALOPRAM 10 MG TABLET: Performed by: INTERNAL MEDICINE

## 2021-10-06 PROCEDURE — 63710000001 ROSUVASTATIN 20 MG TABLET: Performed by: INTERNAL MEDICINE

## 2021-10-06 PROCEDURE — 63710000001 NYSTATIN 100,000 UNIT/ML SUSPENSION: Performed by: INTERNAL MEDICINE

## 2021-10-06 PROCEDURE — 63710000001 PANTOPRAZOLE 40 MG TABLET DELAYED-RELEASE: Performed by: INTERNAL MEDICINE

## 2021-10-06 PROCEDURE — A9270 NON-COVERED ITEM OR SERVICE: HCPCS | Performed by: INTERNAL MEDICINE

## 2021-10-06 PROCEDURE — 43239 EGD BIOPSY SINGLE/MULTIPLE: CPT | Performed by: INTERNAL MEDICINE

## 2021-10-06 PROCEDURE — 25010000002 PROPOFOL 10 MG/ML EMULSION: Performed by: NURSE ANESTHETIST, CERTIFIED REGISTERED

## 2021-10-06 PROCEDURE — 99217 PR OBSERVATION CARE DISCHARGE MANAGEMENT: CPT | Performed by: INTERNAL MEDICINE

## 2021-10-06 PROCEDURE — 0 IOPAMIDOL PER 1 ML: Performed by: INTERNAL MEDICINE

## 2021-10-06 PROCEDURE — 74178 CT ABD&PLV WO CNTR FLWD CNTR: CPT

## 2021-10-06 PROCEDURE — 88360 TUMOR IMMUNOHISTOCHEM/MANUAL: CPT

## 2021-10-06 PROCEDURE — 88377 M/PHMTRC ALYS ISHQUANT/SEMIQ: CPT

## 2021-10-06 PROCEDURE — 71260 CT THORAX DX C+: CPT

## 2021-10-06 PROCEDURE — 82962 GLUCOSE BLOOD TEST: CPT

## 2021-10-06 PROCEDURE — 80053 COMPREHEN METABOLIC PANEL: CPT | Performed by: INTERNAL MEDICINE

## 2021-10-06 RX ORDER — METOCLOPRAMIDE 5 MG/1
5 TABLET ORAL
Qty: 160 TABLET | Refills: 0 | Status: SHIPPED | OUTPATIENT
Start: 2021-10-06

## 2021-10-06 RX ORDER — LIDOCAINE HYDROCHLORIDE 10 MG/ML
0.5 INJECTION, SOLUTION EPIDURAL; INFILTRATION; INTRACAUDAL; PERINEURAL ONCE AS NEEDED
Status: DISCONTINUED | OUTPATIENT
Start: 2021-10-06 | End: 2021-10-06 | Stop reason: HOSPADM

## 2021-10-06 RX ORDER — PANTOPRAZOLE SODIUM 40 MG/1
40 TABLET, DELAYED RELEASE ORAL
Qty: 30 TABLET | Refills: 0 | Status: SHIPPED | OUTPATIENT
Start: 2021-10-07

## 2021-10-06 RX ORDER — FAMOTIDINE 20 MG/1
20 TABLET, FILM COATED ORAL ONCE
Status: DISCONTINUED | OUTPATIENT
Start: 2021-10-06 | End: 2021-10-06 | Stop reason: HOSPADM

## 2021-10-06 RX ORDER — HYDROMORPHONE HYDROCHLORIDE 1 MG/ML
0.5 INJECTION, SOLUTION INTRAMUSCULAR; INTRAVENOUS; SUBCUTANEOUS
Status: DISCONTINUED | OUTPATIENT
Start: 2021-10-06 | End: 2021-10-06 | Stop reason: HOSPADM

## 2021-10-06 RX ORDER — SODIUM CHLORIDE, SODIUM LACTATE, POTASSIUM CHLORIDE, CALCIUM CHLORIDE 600; 310; 30; 20 MG/100ML; MG/100ML; MG/100ML; MG/100ML
9 INJECTION, SOLUTION INTRAVENOUS CONTINUOUS
Status: DISCONTINUED | OUTPATIENT
Start: 2021-10-06 | End: 2021-10-06

## 2021-10-06 RX ORDER — SODIUM CHLORIDE 9 MG/ML
50 INJECTION, SOLUTION INTRAVENOUS CONTINUOUS
Status: DISCONTINUED | OUTPATIENT
Start: 2021-10-06 | End: 2021-10-06

## 2021-10-06 RX ORDER — SODIUM CHLORIDE 0.9 % (FLUSH) 0.9 %
10 SYRINGE (ML) INJECTION AS NEEDED
Status: DISCONTINUED | OUTPATIENT
Start: 2021-10-06 | End: 2021-10-06 | Stop reason: HOSPADM

## 2021-10-06 RX ORDER — SODIUM CHLORIDE 0.9 % (FLUSH) 0.9 %
10 SYRINGE (ML) INJECTION EVERY 12 HOURS SCHEDULED
Status: DISCONTINUED | OUTPATIENT
Start: 2021-10-06 | End: 2021-10-06 | Stop reason: HOSPADM

## 2021-10-06 RX ORDER — FAMOTIDINE 10 MG/ML
20 INJECTION, SOLUTION INTRAVENOUS ONCE
Status: COMPLETED | OUTPATIENT
Start: 2021-10-06 | End: 2021-10-06

## 2021-10-06 RX ORDER — FENTANYL CITRATE 50 UG/ML
50 INJECTION, SOLUTION INTRAMUSCULAR; INTRAVENOUS
Status: DISCONTINUED | OUTPATIENT
Start: 2021-10-06 | End: 2021-10-06 | Stop reason: HOSPADM

## 2021-10-06 RX ORDER — PROPOFOL 10 MG/ML
VIAL (ML) INTRAVENOUS AS NEEDED
Status: DISCONTINUED | OUTPATIENT
Start: 2021-10-06 | End: 2021-10-06 | Stop reason: SURG

## 2021-10-06 RX ORDER — MIRTAZAPINE 15 MG/1
15 TABLET, FILM COATED ORAL NIGHTLY
Qty: 30 TABLET | Refills: 0 | Status: SHIPPED | OUTPATIENT
Start: 2021-10-06 | End: 2021-10-21 | Stop reason: DRUGHIGH

## 2021-10-06 RX ORDER — MIDAZOLAM HYDROCHLORIDE 1 MG/ML
0.5 INJECTION INTRAMUSCULAR; INTRAVENOUS
Status: DISCONTINUED | OUTPATIENT
Start: 2021-10-06 | End: 2021-10-06 | Stop reason: HOSPADM

## 2021-10-06 RX ADMIN — ROSUVASTATIN CALCIUM 40 MG: 20 TABLET, COATED ORAL at 09:39

## 2021-10-06 RX ADMIN — FAMOTIDINE 20 MG: 10 INJECTION INTRAVENOUS at 07:29

## 2021-10-06 RX ADMIN — SODIUM CHLORIDE, PRESERVATIVE FREE 10 ML: 5 INJECTION INTRAVENOUS at 09:39

## 2021-10-06 RX ADMIN — ESCITALOPRAM OXALATE 5 MG: 10 TABLET ORAL at 09:39

## 2021-10-06 RX ADMIN — NYSTATIN 500000 UNITS: 100000 SUSPENSION ORAL at 12:56

## 2021-10-06 RX ADMIN — NYSTATIN 500000 UNITS: 100000 SUSPENSION ORAL at 09:38

## 2021-10-06 RX ADMIN — PROPOFOL 100 MG: 10 INJECTION, EMULSION INTRAVENOUS at 08:32

## 2021-10-06 RX ADMIN — SODIUM CHLORIDE 50 ML/HR: 9 INJECTION, SOLUTION INTRAVENOUS at 07:31

## 2021-10-06 RX ADMIN — PROPOFOL 50 MG: 10 INJECTION, EMULSION INTRAVENOUS at 08:35

## 2021-10-06 RX ADMIN — PANTOPRAZOLE SODIUM 40 MG: 40 TABLET, DELAYED RELEASE ORAL at 09:38

## 2021-10-06 RX ADMIN — IOPAMIDOL 100 ML: 755 INJECTION, SOLUTION INTRAVENOUS at 14:00

## 2021-10-06 NOTE — POST-PROCEDURE NOTE
EGD  Non circumferential mass 30-38 cm extending to GEJ.   Antral gastritis.      REC-FU path.  Liver mass protocol when Cr improved

## 2021-10-06 NOTE — DISCHARGE SUMMARY
[facility] Hospital Medicine Services  DISCHARGE SUMMARY    Patient Name: Andry Potter  : 1955  MRN: [id number]    Date of Admission: 10/4/2021  3:15 PM  Date of Discharge:  10/6/2021  Primary Care Physician: Meghan Watt APRN    Consults     Date and Time Order Name Status Description    10/4/2021 10:25 PM Inpatient Gastroenterology Consult Completed           Hospital Course     Presenting Problem:   Weakness [R53.1]    Active Hospital Problems    Diagnosis  POA   • Esophageal mass [K22.89]  Yes   • Moderate malnutrition (CMS/HCC) [E44.0]  Yes   • Abnormal CT of the abdomen [R93.5]  Yes   • CKD (chronic kidney disease) stage 2, GFR 60-89 ml/min [N18.2]  Yes      Resolved Hospital Problems    Diagnosis Date Resolved POA   • LOTTIE (acute kidney injury) (HCC) [N17.9] 10/06/2021 Yes   • Weakness [R53.1] 10/06/2021 Yes   • Weight loss [R63.4] 10/06/2021 Yes          Hospital Course:  Andry Potter is a 66 y.o. male male with past medical history of CVA (right MCA occlusion  status post thrombectomy), atrial fibrillation, hypertension, hyperlipidemia, BPH  who presents to [facility] today for weakness.      Hypotension, resolved  -This is suspect due to being overmedicated related to recent 40 pound weight loss. Resolved after IV fluids. Continue to hold antihypertensives indefinitely at d/c.     Esophageal mass  Abnormal CT  Elevated liver enzymes.  -GI followed throughout stay. He underwent EGD showing esophageal mass which appeared malignant - biopsies pending at time of d/c   -He is also noted to have an abnormal appearing liver on CT A/P which I suspect is representative of metastatic disease. I have d/w him, his sister, and his caregiver. CT C/A/P w/ and w/out contrast ordered prior to his discharge.   -Will arrange close follow up w/ [facility] [place] oncology at d/c.     Nausea/Vomiting  Anorexia  --Due to above but also had some symptomatic improvement after addition of reglan  and Remeron which we will continue at d/c.    Discharge Follow Up Recommendations for outpatient labs/diagnostics:   PCP in 1 week   [facility] [place] oncology next available.    Day of Discharge     HPI: Up in chair. Feeling a lot better. No nausea. Ate all of lunch.    Review of Systems  Gen- No fevers, chills  CV- No chest pain, palpitations  Resp- No cough, dyspnea  GI- No N/V/D, abd pain    Vital Signs:   Temp:  [97.2 °F (36.2 °C)-98 °F (36.7 °C)] 98 °F (36.7 °C)  Heart Rate:  [62-71] 63  Resp:  [14-20] 20  BP: ()/(57-70) 112/63     Physical Exam:  Constitutional: No acute distress, awake, alert  HENT: NCAT, mucous membranes moist  Respiratory: Clear to auscultation bilaterally, respiratory effort normal   Cardiovascular: RRR, no murmurs, rubs, or gallops  Gastrointestinal: Positive bowel sounds, soft, nontender, nondistended  Musculoskeletal: No bilateral ankle edema  Psychiatric: Appropriate affect, cooperative  Neurologic: Oriented x 3, strength symmetric in all extremities, Cranial Nerves grossly intact to confrontation, speech clear  Skin: No rashes    Pertinent  and/or Most Recent Results     LAB RESULTS:      Lab 10/05/21  0853 10/04/21  1526 10/01/21  0718   WBC 7.69 10.10 10.63   HEMOGLOBIN 10.3* 11.1* 11.4*   HEMATOCRIT 30.4* 32.7* 34.4*   PLATELETS 206 235 261   NEUTROS ABS 3.54 4.47 4.25   IMMATURE GRANS (ABS) 0.02 0.03 0.03   LYMPHS ABS 3.44* 4.62* 5.35*   MONOS ABS 0.50 0.77 0.76   EOS ABS 0.15 0.15 0.18   MCV 94.7 94.0 96.6         Lab 10/06/21  1042 10/05/21  0853 10/04/21  1526 10/01/21  0718   SODIUM 137 136 137 137   POTASSIUM 4.5 4.9 5.1 5.1   CHLORIDE 106 104 104 103   CO2 19.0* 18.0* 19.0* 20.9*   ANION GAP 12.0 14.0 14.0 13.1   BUN 24* 33* 38* 40*   CREATININE 1.32* 1.67* 1.92* 1.97*   GLUCOSE 88 125* 93 94   CALCIUM 9.1 9.1 9.8 10.0   MAGNESIUM  --  1.5* 1.7  --    PHOSPHORUS  --  2.8  --   --    HEMOGLOBIN A1C  --   --   --  6.10*   TSH  --  1.210  --   --          Lab 10/06/21  1048  10/05/21  0853 10/04/21  1526 10/01/21  0718   TOTAL PROTEIN 6.8 6.6 7.2 7.2   ALBUMIN 3.70 4.00 4.30 4.40   GLOBULIN 3.1 2.6 2.9 2.8   ALT (SGPT) 75* 56* 55* 37   AST (SGOT) 77* 52* 49* 38   BILIRUBIN 0.3 0.4 0.3 0.4   ALK PHOS 131* 118* 125* 109         Lab 10/04/21  1526   TROPONIN T 0.011         Lab 10/01/21  0718   CHOLESTEROL 122   LDL CHOL 64   HDL CHOL 31*   TRIGLYCERIDES 158*         Lab 10/05/21  0853   IRON 50*   IRON SATURATION 17*   TIBC 292*   TRANSFERRIN 196*   FOLATE 10.10   VITAMIN B 12 575         Brief Urine Lab Results  (Last result in the past 365 days)      Color   Clarity   Blood   Leuk Est   Nitrite   Protein   CREAT   Urine HCG        10/04/21 1617 Yellow Clear Negative Small (1+) Negative 100 mg/dL (2+)             Microbiology Results (last 10 days)     Procedure Component Value - Date/Time    COVID PRE-OP / PRE-PROCEDURE SCREENING ORDER (NO ISOLATION) - Swab, Nasopharynx [[id number]]  (Normal) Collected: 10/04/21 1715    Lab Status: Final result Specimen: Swab from Nasopharynx Updated: 10/04/21 1751    Narrative:      The following orders were created for panel order COVID PRE-OP / PRE-PROCEDURE SCREENING ORDER (NO ISOLATION) - Swab, Nasopharynx.  Procedure                               Abnormality         Status                     ---------                               -----------         ------                     COVID-19 and FLU A/B PCR...[[id number]]  Normal              Final result                 Please view results for these tests on the individual orders.    COVID-19 and FLU A/B PCR - Swab, Nasopharynx [[id number]]  (Normal) Collected: 10/04/21 1715    Lab Status: Final result Specimen: Swab from Nasopharynx Updated: 10/04/21 1751     COVID19 Not Detected     Influenza A PCR Not Detected     Influenza B PCR Not Detected    Narrative:      Fact sheet for providers: https://www.fda.gov/media/418982/download    Fact sheet for patients:  https://www.fda.gov/media/251872/download    Test performed by Psychiatric.          CT Abdomen Pelvis Without Contrast    Result Date: 10/5/2021  EXAMINATION: CT ABDOMEN PELVIS WO CONTRAST- 10/05/2021  INDICATION: Nausea/vomiting; R42-Dizziness and giddiness; R53.1-Weakness; Z86.73-Personal history of transient ischemic attack (TIA), and cerebral infarction without residual deficits; N17.9-Acute kidney failure, unspecified; I95.1-Orthostatic hypotension; R63.4-Abnormal weight loss; R53.1-Weakness  TECHNIQUE: 5 mm unenhanced images through the abdomen and pelvis.  The radiation dose reduction device was turned on for each scan per the ALARA (As Low as Reasonably Achievable) protocol.  COMPARISON: NONE  FINDINGS: History indicates nausea, vomiting, unexplained weight loss, generalized weakness.  The included lung bases appear grossly clear. There is a water density 12 mm left lobe liver cyst. In addition, there is a heterogeneous appearance of the liver parenchyma, particularly in segments 4A, 4B, segment 7 and segment 8. This is potentially due to irregular fatty liver change, but the possibility of metastatic disease is a consideration as well. Liver capsule appears slightly irregular. Gallbladder is decompressed and otherwise unremarkable in appearance. A few granulomatous calcifications are seen in the otherwise normal-appearing spleen. No significant abnormalities are noted of the pancreas, adrenal glands, or kidneys for a non-IV contrast enhanced exam. No upper abdominal adenopathy, ascites, or acute inflammatory change is seen.  Regarding the lower abdomen and pelvis, no ascites, adenopathy or acute inflammatory change is seen. There is streak artifact from patient's right hip prosthesis. Review of the colon shows at least no gross evidence of mass or inflammation. Bladder is obscured by streak artifact but apparently nondistended. Terminal ileum cecum and appendix appear grossly normal. Bony structures appear to be  intact.      1. Small water density left lobe liver cyst, and also multiple other areas of heterogeneous liver parenchyma of uncertain significance. Unusual pattern of fatty liver change, and infiltration by tumor are considerations. Correlation with patient's serum liver chemistries is needed. Follow-up with contrast-enhanced liver protocol CT scan or liver MRI is suggested for better characterization of disease.  2. No evidence of acute inflammatory process, bowel obstruction or other clearly acute intra-abdominal or intrapelvic disease elsewhere.  D: 10/05/2021 E: 10/05/2021      XR Chest 1 View    Result Date: 10/5/2021  EXAMINATION: XR CHEST 1 VW-  INDICATION: Weak, dizzy, AMS triage protocol.  COMPARISON: 06/29/2021.  FINDINGS: Heart, mediastinum and pulmonary vasculature appear within normal limits. Mild coarsening of the pulmonary interstitial markings appears stable from the prior exam, apparently chronic. No new pulmonary parenchymal disease, evidence of edema, effusion or pneumothorax is seen.      No new chest disease.   D:  10/04/2021 E:  10/05/2021  This report was finalized on 10/5/2021 9:42 PM by Dr. Adriano Lopez MD.                    Plan for Follow-up of Pending Labs/Results:   Pending Labs     Order Current Status    Tissue Pathology Exam In process        Discharge Details        Discharge Medications      New Medications      Instructions Start Date   glucose blood test strip   Use to monitor glucose daily as instructed      glucose monitor monitoring kit   Use to monitor glucose daily      metoclopramide 5 MG tablet  Commonly known as: REGLAN   5 mg, Oral, 4 Times Daily Before Meals & Nightly      mirtazapine 15 MG tablet  Commonly known as: REMERON   15 mg, Oral, Nightly      onetouch ultrasoft lancets   Use to monitor glucose once daily      pantoprazole 40 MG EC tablet  Commonly known as: PROTONIX   40 mg, Oral, Every Early Morning   Start Date: October 7, 2021        Continue These  Medications      Instructions Start Date   aspirin 81 MG EC tablet   81 mg, Oral, Daily      escitalopram 5 MG tablet  Commonly known as: Lexapro   5 mg, Oral, Daily      rosuvastatin 40 MG tablet  Commonly known as: Crestor   40 mg, Oral, Daily      VITAMIN B-3 PO   1 tablet, Oral, Daily      Xarelto 20 MG tablet  Generic drug: rivaroxaban   20 mg, Oral, Daily         Stop These Medications    SITagliptin 50 MG tablet  Commonly known as: Januvia            No Known Allergies      Discharge Disposition:  Home or Self Care    Diet:  Hospital:  Diet Order   Procedures   • Diet Regular; GI Soft, Cardiac, Consistent Carbohydrate            CODE STATUS:    Code Status and Medical Interventions:   Ordered at: 10/04/21 173     Level Of Support Discussed With:    Patient     Code Status:    CPR     Medical Interventions (Level of Support Prior to Arrest):    Full       Future Appointments   Date Time Provider Department Center   10/19/2021  9:15 AM Meghan Watt APRN [facility]   10/21/2021  9:30 AM Naga Rosenberg MD [facility]   1/31/2022  9:15 AM Annemarie Martino APRN [facility]       Additional Instructions for the Follow-ups that You Need to Schedule     Ambulatory Referral to Oncology   As directed      First available MD appointemnt    Order Comments: First available MD appointemmajor Gamino II, DO  10/06/21      Time Spent on Discharge:  I spent  36  minutes on this discharge activity which included: face-to-face encounter with the patient, reviewing the data in the system, coordination of the care with the nursing staff as well as consultants, documentation, and entering orders.

## 2021-10-06 NOTE — ANESTHESIA PREPROCEDURE EVALUATION
Anesthesia Evaluation     Patient summary reviewed and Nursing notes reviewed   NPO Solid Status: > 8 hours  NPO Liquid Status: > 8 hours           Airway   Mallampati: III  TM distance: >3 FB  Neck ROM: limited  Possible difficult intubation  Dental    (+) poor dentition    Pulmonary    (+) a smoker Former, sleep apnea,   (-) shortness of breath, recent URICOPD: mild no MDI resumed from smoking Hx   Cardiovascular     ECG reviewed    (+) hypertension, CAD, hyperlipidemia,   (-) dysrhythmias, angina    ROS comment: ECG Normal sinus rhythm  Poss Inferior infarct    Neuro/Psych  (+) CVA (R MCA stroke June 2021thromboembolic stroke with [facility] thrombectomy ) residual symptoms, weakness,     (-) seizures, TIA  GI/Hepatic/Renal/Endo    (+) obesity,   renal disease (creat 1.6 no known etiology pt unaware ) CRI, diabetes mellitus type 2 well controlled,   (-) morbid obesity, no thyroid disorder    Musculoskeletal     Abdominal    Substance History      OB/GYN          Other        ROS/Med Hx Other: xarelto  N and V weight loss                   Anesthesia Plan    ASA 3     general and MAC   (PFL (keep MAP >65) )  intravenous induction     Anesthetic plan, all risks, benefits, and alternatives have been provided, discussed and informed consent has been obtained with: patient.    Plan discussed with CRNA.

## 2021-10-06 NOTE — ANESTHESIA POSTPROCEDURE EVALUATION
"Patient: Andry Potter    Procedure Summary     Date: 10/06/21 Room / Location: [facility] hospital ENDOSCOPY 2 /  [facility]    Anesthesia Start: 0831 Anesthesia Stop:     Procedure: ESOPHAGOGASTRODUODENOSCOPY (N/A ) Diagnosis:       Weight loss      Weakness      (Weight loss [R63.4])      (Weakness [R53.1])    Surgeons: Obed Galvez MD Provider: Parth Henderson MD    Anesthesia Type: general, MAC ASA Status: 3          Anesthesia Type: general, MAC    Vitals  Vitals Value Taken Time   /57 10/06/21 0900   Temp 97.2 °F (36.2 °C) 10/06/21 0848   Pulse 65 10/06/21 0903   Resp 20 10/06/21 0848   SpO2 94 % 10/06/21 0903   Vitals shown include unvalidated device data.        Post Anesthesia Care and Evaluation    Patient location during evaluation: PACU  Patient participation: complete - patient participated  Level of consciousness: awake and responsive to verbal stimuli  Pain score: 2  Pain management: adequate  Airway patency: patent  Anesthetic complications: No anesthetic complications    Cardiovascular status: acceptable  Respiratory status: acceptable  Hydration status: acceptable    Comments: Pt awake and responsive. SV. VSS. Report to RN. Patient Vitals in the past 24 hrs:  10/06/21 0900, BP:114/57, Pulse:66, SpO2:99 %  10/06/21 0855, BP:110/60, Pulse:63, SpO2:97 %  10/06/21 0850, BP:99/57, SpO2:98 %  10/06/21 0848, BP:99/57, Temp:97.2 °F (36.2 °C), Temp src:Temporal, Pulse:68, Resp:20, SpO2:96 %  10/06/21 0717, BP:142/70, Temp:97.8 °F (36.6 °C), Temp src:Temporal, Pulse:71, Resp:20, SpO2:97 %, Height:177.8 cm (70\"), Weight:90.3 kg (199 lb)  10/06/21 0646, BP:126/68, Pulse:70, Resp:18, SpO2:94 %  10/06/21 0317, BP:120/62, Temp:97.6 °F (36.4 °C), Temp src:Oral, Pulse:64, Resp:16, SpO2:97 %  10/05/21 2247, BP:121/67, Temp:97.8 °F (36.6 °C), Temp src:Oral, Pulse:65, Resp:16, SpO2:96 %  10/05/21 1851, BP:100/66, Temp:98 °F (36.7 °C), Temp src:Oral, Pulse:66, Resp:14  10/05/21 1600, BP:114/60, Temp:97.9 °F (36.6 " °C), Temp src:Oral, Pulse:62, Resp:18, Weight:90.4 kg (199 lb 6.4 oz)  10/05/21 1100, BP:116/64, Temp:97.7 °F (36.5 °C), Temp src:Oral, Resp:18  133/78. p 72. r 16. t 98.1

## 2021-10-06 NOTE — CASE MANAGEMENT/SOCIAL WORK
Continued Stay Note  [facility]     Patient Name: Andry Potter  MRN: [id number]  Today's Date: 10/6/2021    Admit Date: 10/4/2021    Discharge Plan     Row Name 10/06/21 1528       Plan    Plan  Home    Plan Comments  I spoke with patient in regards to arranging home health for him. He tells me he is not interested in this. His friend and sister will be checking in on him. He declines the need for any DME    Final Discharge Disposition Code  01 - home or self-care        Discharge Codes    No documentation.       Expected Discharge Date and Time     Expected Discharge Date Expected Discharge Time    Oct 6, 2021             Cathleen Carlisle RN

## 2021-10-07 ENCOUNTER — TELEPHONE (OUTPATIENT)
Dept: FAMILY MEDICINE CLINIC | Facility: CLINIC | Age: 66
End: 2021-10-07

## 2021-10-07 ENCOUNTER — TRANSITIONAL CARE MANAGEMENT TELEPHONE ENCOUNTER (OUTPATIENT)
Dept: CALL CENTER | Facility: HOSPITAL | Age: 66
End: 2021-10-07

## 2021-10-07 DIAGNOSIS — B37.0 THRUSH: Primary | ICD-10-CM

## 2021-10-07 LAB
QT INTERVAL: 358 MS
QTC INTERVAL: 420 MS

## 2021-10-07 NOTE — TELEPHONE ENCOUNTER
PATIENT HAS CALLED AND STATED HE  WOULD LIKE A NEW PRESCRIPTION FOR MAGIC MOUTHWASH.  PATIENT WAS IN HOSPITAL AND WAS TAKING FOR THRUSH.    PATIENT USES [facility] IN [address].    CALL BACK NUMBER [phone]

## 2021-10-07 NOTE — TELEPHONE ENCOUNTER
Provider had reviewed pt's labs and was getting ready to call pt, but reviewed chart and found he was hospitalized d/t hypotension and acute renal failure.  Called and spoke with pt's family member Yolis Allen who is on his HARDY form and accompanied him to his office visit.  She states that he has been hospitalized for his low b/p and his abnormal kidney functioning.  She states that since his visit he had continued to not have much oral intake.  She appreciated the call by provider.  Will continue to follow his hospital stay and f/u.  This provider will be switching clinics in January and friend states that they are thinking about sticking with [facility] for pt's PCP.  Provider agreed this was a good option for him to become established with new provider since current provider will be leaving this clinic in January.

## 2021-10-07 NOTE — OUTREACH NOTE
Call Center TCM Note      Responses   [facility] patient discharged from?  [place]   Does the patient have one of the following disease processes/diagnoses(primary or secondary)?  Other   TCM attempt successful?  Yes   Discharge diagnosis  s/p  EGD showing esophageal mass    Meds reviewed with patient/caregiver?  Yes   Is the patient having any side effects they believe may be caused by any medication additions or changes?  No   Does the patient have all medications ordered at discharge?  Yes   Is the patient taking all medications as directed (includes completed medication regime)?  Yes   Does the patient have a primary care provider?   Yes   Does the patient have an appointment with their PCP within 7 days of discharge?  Yes   Comments regarding PCP  TCM FWP with PCP ofc is 10/11/2021, pt will also have NP appt with Oncology same day.    Has the patient kept scheduled appointments due by today?  N/A   Has home health visited the patient within 72 hours of discharge?  N/A   Psychosocial issues?  No   Did the patient receive a copy of their discharge instructions?  Yes   Nursing interventions  Reviewed instructions with patient   What is the patient's perception of their health status since discharge?  Same   Is the patient/caregiver able to teach back signs and symptoms related to disease process for when to call PCP?  Yes   Is the patient/caregiver able to teach back signs and symptoms related to disease process for when to call 911?  Yes   Is the patient/caregiver able to teach back the hierarchy of who to call/visit for symptoms/problems? PCP, Specialist, Home health nurse, Urgent Care, ED, 911  Yes   If the patient is a current smoker, are they able to teach back resources for cessation?  Not a smoker   TCM call completed?  Yes   Wrap up additional comments  Pt doing ok, feels about the same, alot to process. All new meds and DME in place. No questions at this time. TCM FWP with PCP ofc is 10/11/2021,  pt will also have NP appt with Oncology same day.           Ale Fuchs MA    10/7/2021, 10:23 EDT

## 2021-10-07 NOTE — TELEPHONE ENCOUNTER
I sent in the Nystatin solution that he was taking at the hospital.  He will take 5mL 4 times per day and swish and swallow it.  He will take it for 7 days.

## 2021-10-07 NOTE — OUTREACH NOTE
Prep Survey      Responses   [facility] patient discharged from?  [place]   Is LACE score < 7 ?  No   Emergency Room discharge w/ pulse ox?  No   Eligibility  [facility]   Date of Admission  10/04/21   Date of Discharge  10/06/21   Discharge Disposition  Home or Self Care   Discharge diagnosis  s/p  EGD showing esophageal mass    Does the patient have one of the following disease processes/diagnoses(primary or secondary)?  Other   Does the patient have Home health ordered?  No   Is there a DME ordered?  No   Prep survey completed?  Yes          Renate Hutchins RN

## 2021-10-11 ENCOUNTER — LAB (OUTPATIENT)
Dept: LAB | Facility: HOSPITAL | Age: 66
End: 2021-10-11

## 2021-10-11 ENCOUNTER — OFFICE VISIT (OUTPATIENT)
Dept: FAMILY MEDICINE CLINIC | Facility: CLINIC | Age: 66
End: 2021-10-11

## 2021-10-11 ENCOUNTER — CONSULT (OUTPATIENT)
Dept: ONCOLOGY | Facility: CLINIC | Age: 66
End: 2021-10-11

## 2021-10-11 VITALS
TEMPERATURE: 98.4 F | SYSTOLIC BLOOD PRESSURE: 114 MMHG | WEIGHT: 195 LBS | DIASTOLIC BLOOD PRESSURE: 62 MMHG | OXYGEN SATURATION: 97 % | HEART RATE: 96 BPM | BODY MASS INDEX: 27.92 KG/M2 | HEIGHT: 70 IN

## 2021-10-11 VITALS
HEIGHT: 70 IN | HEART RATE: 106 BPM | BODY MASS INDEX: 27.86 KG/M2 | SYSTOLIC BLOOD PRESSURE: 110 MMHG | TEMPERATURE: 98.9 F | OXYGEN SATURATION: 96 % | WEIGHT: 194.6 LBS | DIASTOLIC BLOOD PRESSURE: 54 MMHG

## 2021-10-11 DIAGNOSIS — B37.0 THRUSH: ICD-10-CM

## 2021-10-11 DIAGNOSIS — C15.9 ESOPHAGEAL ADENOCARCINOMA (HCC): Primary | ICD-10-CM

## 2021-10-11 DIAGNOSIS — I63.511 ACUTE ISCHEMIC RIGHT MCA STROKE (HCC): ICD-10-CM

## 2021-10-11 DIAGNOSIS — E44.0 MODERATE MALNUTRITION (HCC): ICD-10-CM

## 2021-10-11 DIAGNOSIS — E11.29 TYPE 2 DIABETES MELLITUS WITH OTHER DIABETIC KIDNEY COMPLICATION, WITHOUT LONG-TERM CURRENT USE OF INSULIN (HCC): ICD-10-CM

## 2021-10-11 DIAGNOSIS — N18.2 CKD (CHRONIC KIDNEY DISEASE) STAGE 2, GFR 60-89 ML/MIN: ICD-10-CM

## 2021-10-11 PROCEDURE — 99204 OFFICE O/P NEW MOD 45 MIN: CPT | Performed by: INTERNAL MEDICINE

## 2021-10-11 PROCEDURE — 1111F DSCHRG MED/CURRENT MED MERGE: CPT | Performed by: NURSE PRACTITIONER

## 2021-10-11 PROCEDURE — 99496 TRANSJ CARE MGMT HIGH F2F 7D: CPT | Performed by: NURSE PRACTITIONER

## 2021-10-11 RX ORDER — ONDANSETRON 8 MG/1
8 TABLET, ORALLY DISINTEGRATING ORAL EVERY 8 HOURS PRN
Qty: 60 TABLET | Refills: 3 | Status: SHIPPED | OUTPATIENT
Start: 2021-10-11

## 2021-10-11 RX ORDER — BLOOD-GLUCOSE METER
EACH MISCELLANEOUS
COMMUNITY
Start: 2021-10-06

## 2021-10-11 NOTE — PROGRESS NOTES
Transitional Care Follow Up Visit  Subjective     Andry Potter is a 66 y.o. male who presents for a transitional care management visit.    Within 48 business hours after discharge our office contacted him via telephone to coordinate his care and needs.      I reviewed and discussed the details of that call along with the discharge summary, hospital problems, inpatient lab results, inpatient diagnostic studies, and consultation reports with Andry.     Current outpatient and discharge medications have been reconciled for the patient.  Reviewed by: LIZBETH Ruvalcaba      Date of TCM Phone Call 10/6/2021   [facility]   Date of Admission 10/4/2021   Date of Discharge 10/6/2021   Discharge Disposition Home or Self Care     Risk for Readmission (LACE) Score: 7 (10/6/2021  6:00 AM)      History of Present Illness   Course During Hospital Stay:  10/4/21-10/6/21    He still has issues with vomiting after eating.  He can sometimes get things down, but then it will come back up within 5 minutes.  It is mostly white phlegm.  He does feel nauseated.  He can look at food and feel nauseated.  Food doesn't have much taste at all right.  It tastes sweet.  He has Reglan, but today got Zofran.  He drank 5-6 Ensure yesterday.  He ate country ham and egg this AM and it did stay down.  Her drank an Ensure at lunch.  He has been working on drinking more water.  His urine is a light yellow in color.  He denies anymore lightheaded.  He got weak getting out of the shower this AM.  He rested and it got better.      He has been using the Nysatin switch and swallow.  He feels like this has helped with his mouth.      He doesn't have a game plan at present.  He didn't think that radiation would help at present.  They talked about Folfox.  He would get an infusion and then the bulb would stay in for 48 hours and then he would return to have it removed.      He has started sleeping better since starting the Remeron.   "    They are going to stop the Xarelto.  They think that the stroke came from the mass.      He is not having any pain at present.  He had some pain after the biopsies.  It didn't start until after he got home.      He is using the bathroom ok.      He has not been taking his b/p medication.      He is not having any trouble breathing.       The following portions of the patient's history were reviewed and updated as appropriate: allergies, current medications, past family history, past medical history, past social history, past surgical history and problem list.      Vitals:    10/11/21 1516   BP: 114/62   Pulse: 96   Temp: 98.4 °F (36.9 °C)   SpO2: 97%   Weight: 88.5 kg (195 lb)   Height: 177.8 cm (70\")   PainSc: 10-Worst pain ever     Objective   Physical Exam  Vitals reviewed.   Constitutional:       Appearance: Normal appearance.   HENT:      Head: Normocephalic and atraumatic.      Mouth/Throat:      Comments: Thick white coating to tongue.  Cardiovascular:      Rate and Rhythm: Normal rate and regular rhythm.      Heart sounds: Normal heart sounds.   Pulmonary:      Effort: Pulmonary effort is normal.      Breath sounds: Normal breath sounds.   Skin:     General: Skin is warm and dry.   Neurological:      General: No focal deficit present.      Mental Status: He is alert and oriented to person, place, and time.   Psychiatric:         Mood and Affect: Mood normal.         Behavior: Behavior normal.         Thought Content: Thought content normal.         Judgment: Judgment normal.      Comments: Patient became emotional towards the end of the visit when talking about his cancer.         Assessment/Plan   Diagnoses and all orders for this visit:    1. Esophageal adenocarcinoma (HCC) (Primary) -patient was seen by Dr. Ernandez with oncology today.  He discussed with patient that his cancer is stage IV.  Radiation would not help, but the chemotherapy is an option.  Patient told Dr. Ernandez that he would like to " discuss treatment options further with his family before deciding on a plan.  --Patient plans on having a discussion with his family tonight to determine the next step.  --Continue to use Reglan and Zofran as needed for nausea and vomiting.  --Stop Lexapro.  Continue Remeron 15mg nightly.    2. Moderate malnutrition (CMS/HCC) -patient has lost 4 pounds since his last weight check.  He has been drinking 5-6 Ensure a day.  He states that these do tastes good and he is tolerating them well.  The concern for drinking Ensure is that it can raise his blood sugars.  Discussed further reviewing eating and supplement recommendations with nutrition.  Patient is agreeable to this.  --Referral placed to nutrition.  --Continue to drink Ensure supplements for now.  Encouraged patient to continue to try to eat as tolerated.  -     Ambulatory Referral to Nutrition Services    3. CKD (chronic kidney disease) stage 2, GFR 60-89 ml/min -kidney functioning improved while hospitalized.  --Continue with adequate fluid intake.    4. Type 2 diabetes mellitus with other diabetic kidney complication, without long-term current use of insulin (HCC) -continues to be well controlled.  Does have occasional higher readings since he has started drinking the Ensure.  --Referral was placed to nutrition to discuss different supplement options since patient does continue to have issues with eating due to nausea and vomiting.    5. Thrush -still present.  --RF Nystatin swish and swallow 5mL QID x 7 days.  -     nystatin (MYCOSTATIN) 100,000 unit/mL suspension; Swish and swallow 5 mL 4 (Four) Times a Day for 7 days.  Dispense: 140 mL; Refill: 0    6. Acute ischemic right MCA stroke, status post TPA and right thrombectomy -patient's family member talked to Dr. Baldwin and the thought is that the stroke was likely caused by the cancer and he no longer needs to take The Xarelto.    Return in about 2 weeks (around 10/25/2021) for Follow-up.    Meghan  Click, APRN

## 2021-10-11 NOTE — PROGRESS NOTES
New Patient Office Visit      Date: 10/11/2021     Patient Name: Andry Potter  MRN: [id number]  : 1955  Referring Physician: Obed Galvez    Chief Complaint: Establish care for metastatic esophageal adenocarcinoma    History of Present Illness: Andry Potter is a pleasant 66 y.o. male past medical history of CVA, type 2 diabetes, hyperlipidemia and anxiety who presents today for evaluation of esophageal adenocarcinoma. The patient is accompanied by their friend who contributes to the history of their care.  Patient states that he suffered a stroke in 2021.  He has since recovered from that and had no residual neurologic deficits.  He states over the past few months he has had about a 40 pound weight loss.  States that food does not taste good and that he frequently has nausea after eating.  He was admitted for weakness as well as nausea and vomiting.  He had an EGD which was notable for an esophageal lesion.  Biopsy was consistent with adenocarcinoma.  CT chest/abdomen/pelvis was consistent with metastatic disease specifically within the liver.  He was started on Reglan during his hospitalization but continues to have nausea and food aversion.  He otherwise denies any significant abdominal pain or discomfort today and states he is compliant with his home medications    Oncology History:    Oncology/Hematology History   Esophageal adenocarcinoma (HCC)   10/11/2021 Initial Diagnosis    Esophageal adenocarcinoma (HCC)     10/11/2021 Cancer Staged    Staging form: Esophagus - Adenocarcinoma, AJCC 8th Edition  - Clinical: Stage IVB (cM1) - Signed by Sudhakar Ernandez MD on 10/11/2021         Subjective      Review of Systems:     Constitutional: Negative for fevers, chills, or weight loss  Eyes: Negative for blurred vision or discharge         Ear/Nose/Throat: Negative for difficulty swallowing, sore throat, LAD                                                       Respiratory: Negative for cough, SOA,  wheezing                                                                                        Cardiovascular: Negative for chest pain or palpitations                                                                  Gastrointestinal: Negative for nausea, vomiting or diarrhea                                                                     Genitourinary: Negative for dysuria or hematuria                                                                                           Musculoskeletal: Negative for any joint pains or muscle aches                                                                        Neurologic: Negative for any weakness, headaches, dizziness                                                                         Hematologic: Negative for any easy bleeding or bruising                                                                                   Psychiatric: Negative for anxiety or depression                             Past Medical History:   Past Medical History:   Diagnosis Date   • BPH (benign prostatic hyperplasia)    • Diabetes mellitus (HCC)    • Hyperlipidemia    • Hypertension    • Stroke (HCC)        Past Surgical History:   Past Surgical History:   Procedure Laterality Date   • CARDIAC CATHETERIZATION N/A 9/9/2020    Procedure: Left Heart Cath 98661;  Surgeon: Bang Walker MD;  Location:  [facility];  Service: Cardiology;  Laterality: N/A;   • COLONOSCOPY     • ENDOSCOPY N/A 10/6/2021    Procedure: ESOPHAGOGASTRODUODENOSCOPY;  Surgeon: Obed Galvez MD;  Location:  [facility];  Service: Gastroenterology;  Laterality: N/A;   • HAND SURGERY     • HIP BIPOLAR REPLACEMENT     • INTERVENTIONAL RADIOLOGY PROCEDURE N/A 6/29/2021    Procedure: IR MECHANICAL THROMBECTOMY - PRIMARY;  Surgeon: Dru Gomes MD;  Location:  [facility];  Service: Interventional Radiology;  Laterality: N/A;       Family History:   Family History    Problem Relation Age of Onset   • Diabetes Mother        Social History:   Social History     Socioeconomic History   • Marital status: Single   Tobacco Use   • Smoking status: Former Smoker     Types: Cigarettes   • Smokeless tobacco: Never Used   • Tobacco comment: quit 30 years ago   Vaping Use   • Vaping Use: Never used   Substance and Sexual Activity   • Alcohol use: Yes     Comment: once a month   • Drug use: Never   • Sexual activity: Defer       Medications:     Current Outpatient Medications:   •  aspirin 81 MG EC tablet, Take 1 tablet by mouth Daily., Disp: 90 tablet, Rfl: 0  •  Blood Glucose Monitoring Suppl (Accu-Chek Guide Me) w/Device kit, , Disp: , Rfl:   •  escitalopram (Lexapro) 5 MG tablet, Take 1 tablet by mouth Daily., Disp: 30 tablet, Rfl: 0  •  glucose blood test strip, Use to monitor glucose daily as instructed, Disp: 100 each, Rfl: 3  •  glucose monitor monitoring kit, Use to monitor glucose daily, Disp: 1 each, Rfl: 0  •  Lancets (onetouch ultrasoft) lancets, Use to monitor glucose once daily, Disp: 100 each, Rfl: 3  •  metoclopramide (REGLAN) 5 MG tablet, Take 1 tablet by mouth 4 (Four) Times a Day Before Meals & at Bedtime., Disp: 160 tablet, Rfl: 0  •  mirtazapine (REMERON) 15 MG tablet, Take 1 tablet by mouth Every Night., Disp: 30 tablet, Rfl: 0  •  Niacin (VITAMIN B-3 PO), Take 1 tablet by mouth Daily., Disp: , Rfl:   •  nystatin (MYCOSTATIN) 100,000 unit/mL suspension, Swish and swallow 5 mL 4 (Four) Times a Day for 7 days., Disp: 140 mL, Rfl: 0  •  pantoprazole (PROTONIX) 40 MG EC tablet, Take 1 tablet by mouth Every Morning., Disp: 30 tablet, Rfl: 0  •  rivaroxaban (Xarelto) 20 MG tablet, Take 20 mg by mouth Daily., Disp: , Rfl:   •  rosuvastatin (Crestor) 40 MG tablet, Take 1 tablet by mouth Daily., Disp: 30 tablet, Rfl: 0  •  ondansetron ODT (ZOFRAN-ODT) 8 MG disintegrating tablet, Place 1 tablet on the tongue Every 8 (Eight) Hours As Needed for Nausea or Vomiting for up to 60  "doses., Disp: 60 tablet, Rfl: 3    Allergies:   No Known Allergies    Objective     Physical Exam:  Vital Signs:   Vitals:    10/11/21 1410   BP: 110/54   Pulse: 106   Temp: 98.9 °F (37.2 °C)   SpO2: 96%   Weight: 88.3 kg (194 lb 9.6 oz)   Height: 177.8 cm (70\")   PainSc:   3     Pain Score    10/11/21 1410   PainSc:   3     ECOG Performance Status: 1 - Symptomatic but completely ambulatory    Constitutional: NAD, ECOG 1  Eyes: PERRLA, scleral anicteric  ENT: No LAD, no thyromegaly  Respiratory: CTAB, no wheezing, rales, rhonchi  Cardiovascular: RRR, no murmurs, pulses 2+ bilaterally  Abdomen: soft, NT/ND, no HSM  Musculoskeletal: strength 5/5 bilaterally, no c/c/e  Neurologic: A&O x 3, CN II-XII intact grossly  Psych: mood and affect congruent, no SI or HI    Results Review:   Admission on 10/04/2021, Discharged on 10/06/2021   Component Date Value Ref Range Status   • QT Interval 10/04/2021 358  ms Final   • QTC Interval 10/04/2021 420  ms Final   • Glucose 10/04/2021 93  65 - 99 mg/dL Final   • BUN 10/04/2021 38* 8 - 23 mg/dL Final   • Creatinine 10/04/2021 1.92* 0.76 - 1.27 mg/dL Final   • Sodium 10/04/2021 137  136 - 145 mmol/L Final   • Potassium 10/04/2021 5.1  3.5 - 5.2 mmol/L Final    Slight hemolysis detected by analyzer. Results may be affected.   • Chloride 10/04/2021 104  98 - 107 mmol/L Final   • CO2 10/04/2021 19.0* 22.0 - 29.0 mmol/L Final   • Calcium 10/04/2021 9.8  8.6 - 10.5 mg/dL Final   • Total Protein 10/04/2021 7.2  6.0 - 8.5 g/dL Final   • Albumin 10/04/2021 4.30  3.50 - 5.20 g/dL Final   • ALT (SGPT) 10/04/2021 55* 1 - 41 U/L Final   • AST (SGOT) 10/04/2021 49* 1 - 40 U/L Final   • Alkaline Phosphatase 10/04/2021 125* 39 - 117 U/L Final   • Total Bilirubin 10/04/2021 0.3  0.0 - 1.2 mg/dL Final   • eGFR Non African Amer 10/04/2021 35* >60 mL/min/1.73 Final   • Globulin 10/04/2021 2.9  gm/dL Final   • A/G Ratio 10/04/2021 1.5  g/dL Final   • BUN/Creatinine Ratio 10/04/2021 19.8  7.0 - 25.0 " Final   • Anion Gap 10/04/2021 14.0  5.0 - 15.0 mmol/L Final   • Troponin T 10/04/2021 0.011  0.000 - 0.030 ng/mL Final   • Magnesium 10/04/2021 1.7  1.6 - 2.4 mg/dL Final   • Color, UA 10/04/2021 Yellow  Yellow, Straw Final   • Appearance, UA 10/04/2021 Clear  Clear Final   • pH, UA 10/04/2021 6.0  5.0 - 8.0 Final   • Specific Gravity, UA 10/04/2021 1.025  1.005 - 1.030 Final   • Glucose, UA 10/04/2021 Negative  Negative Final   • Ketones, UA 10/04/2021 15 mg/dL (1+)* Negative Final   • Bilirubin, UA 10/04/2021 Small (1+)* Negative Final   • Blood, UA 10/04/2021 Negative  Negative Final   • Protein, UA 10/04/2021 100 mg/dL (2+)* Negative Final   • Leuk Esterase, UA 10/04/2021 Small (1+)* Negative Final   • Nitrite, UA 10/04/2021 Negative  Negative Final   • Urobilinogen, UA 10/04/2021 1.0 E.U./dL  0.2 - 1.0 E.U./dL Final   • Extra Tube 10/04/2021 Hold for add-ons.   Final    Auto resulted.   • Extra Tube 10/04/2021 hold for add-on   Final    Auto resulted   • Extra Tube 10/04/2021 Hold for add-ons.   Final    Auto resulted.   • Extra Tube 10/04/2021 Hold for add-ons.   Final    Auto resulted.   • Extra Tube 10/04/2021 hold for add-on   Final    Auto resulted   • WBC 10/04/2021 10.10  3.40 - 10.80 10*3/mm3 Final   • RBC 10/04/2021 3.48* 4.14 - 5.80 10*6/mm3 Final   • Hemoglobin 10/04/2021 11.1* 13.0 - 17.7 g/dL Final   • Hematocrit 10/04/2021 32.7* 37.5 - 51.0 % Final   • MCV 10/04/2021 94.0  79.0 - 97.0 fL Final   • MCH 10/04/2021 31.9  26.6 - 33.0 pg Final   • MCHC 10/04/2021 33.9  31.5 - 35.7 g/dL Final   • RDW 10/04/2021 11.9* 12.3 - 15.4 % Final   • RDW-SD 10/04/2021 40.8  37.0 - 54.0 fl Final   • MPV 10/04/2021 9.8  6.0 - 12.0 fL Final   • Platelets 10/04/2021 235  140 - 450 10*3/mm3 Final   • Neutrophil % 10/04/2021 44.3  42.7 - 76.0 % Final   • Lymphocyte % 10/04/2021 45.7* 19.6 - 45.3 % Final   • Monocyte % 10/04/2021 7.6  5.0 - 12.0 % Final   • Eosinophil % 10/04/2021 1.5  0.3 - 6.2 % Final   • Basophil %  10/04/2021 0.6  0.0 - 1.5 % Final   • Immature Grans % 10/04/2021 0.3  0.0 - 0.5 % Final   • Neutrophils, Absolute 10/04/2021 4.47  1.70 - 7.00 10*3/mm3 Final   • Lymphocytes, Absolute 10/04/2021 4.62* 0.70 - 3.10 10*3/mm3 Final   • Monocytes, Absolute 10/04/2021 0.77  0.10 - 0.90 10*3/mm3 Final   • Eosinophils, Absolute 10/04/2021 0.15  0.00 - 0.40 10*3/mm3 Final   • Basophils, Absolute 10/04/2021 0.06  0.00 - 0.20 10*3/mm3 Final   • Immature Grans, Absolute 10/04/2021 0.03  0.00 - 0.05 10*3/mm3 Final   • nRBC 10/04/2021 0.0  0.0 - 0.2 /100 WBC Final   • Glucose 10/04/2021 102  70 - 130 mg/dL Final    Meter: [id number] : [id number] Tyler Leyva   • RBC, UA 10/04/2021 0-2  None Seen, 0-2 /HPF Final   • WBC, UA 10/04/2021 3-5* None Seen, 0-2 /HPF Final   • Bacteria, UA 10/04/2021 Trace  None Seen, Trace /HPF Final   • Squamous Epithelial Cells, UA 10/04/2021 0-2  None Seen, 0-2 /HPF Final   • Hyaline Casts, UA 10/04/2021 21-30  0 - 6 /LPF Final   • Methodology 10/04/2021 Manual Light Microscopy   Final   • COVID19 10/04/2021 Not Detected  Not Detected - Ref. Range Final   • Influenza A PCR 10/04/2021 Not Detected  Not Detected Final   • Influenza B PCR 10/04/2021 Not Detected  Not Detected Final   • Glucose 10/04/2021 95  70 - 130 mg/dL Final    Meter: [id number] : [id number] Roshan Wade   • Glucose 10/05/2021 125* 65 - 99 mg/dL Final   • BUN 10/05/2021 33* 8 - 23 mg/dL Final   • Creatinine 10/05/2021 1.67* 0.76 - 1.27 mg/dL Final   • Sodium 10/05/2021 136  136 - 145 mmol/L Final   • Potassium 10/05/2021 4.9  3.5 - 5.2 mmol/L Final   • Chloride 10/05/2021 104  98 - 107 mmol/L Final   • CO2 10/05/2021 18.0* 22.0 - 29.0 mmol/L Final   • Calcium 10/05/2021 9.1  8.6 - 10.5 mg/dL Final   • Total Protein 10/05/2021 6.6  6.0 - 8.5 g/dL Final   • Albumin 10/05/2021 4.00  3.50 - 5.20 g/dL Final   • ALT (SGPT) 10/05/2021 56* 1 - 41 U/L Final   • AST (SGOT) 10/05/2021 52* 1 - 40 U/L Final   • Alkaline Phosphatase  10/05/2021 118* 39 - 117 U/L Final   • Total Bilirubin 10/05/2021 0.4  0.0 - 1.2 mg/dL Final   • eGFR Non African Amer 10/05/2021 41* >60 mL/min/1.73 Final   • Globulin 10/05/2021 2.6  gm/dL Final   • A/G Ratio 10/05/2021 1.5  g/dL Final   • BUN/Creatinine Ratio 10/05/2021 19.8  7.0 - 25.0 Final   • Anion Gap 10/05/2021 14.0  5.0 - 15.0 mmol/L Final   • Cortisol 10/05/2021 10.96    mcg/dL Final   • TSH 10/05/2021 1.210  0.270 - 4.200 uIU/mL Final   • Iron 10/05/2021 50* 59 - 158 mcg/dL Final   • Iron Saturation 10/05/2021 17* 20 - 50 % Final   • Transferrin 10/05/2021 196* 200 - 360 mg/dL Final   • TIBC 10/05/2021 292* 298 - 536 mcg/dL Final   • Vitamin B-12 10/05/2021 575  211 - 946 pg/mL Final   • Folate 10/05/2021 10.10  4.78 - 24.20 ng/mL Final   • Hepatitis B Surface Ag 10/04/2021 Non-Reactive  Non-Reactive Final   • Hep A IgM 10/04/2021 Non-Reactive  Non-Reactive Final   • Hep B C IgM 10/04/2021 Non-Reactive  Non-Reactive Final   • Hepatitis C Ab 10/04/2021 Non-Reactive  Non-Reactive Final   • HIV-1/ HIV-2 10/05/2021 Non-Reactive  Non-Reactive Final   • WBC 10/05/2021 7.69  3.40 - 10.80 10*3/mm3 Final   • RBC 10/05/2021 3.21* 4.14 - 5.80 10*6/mm3 Final   • Hemoglobin 10/05/2021 10.3* 13.0 - 17.7 g/dL Final   • Hematocrit 10/05/2021 30.4* 37.5 - 51.0 % Final   • MCV 10/05/2021 94.7  79.0 - 97.0 fL Final   • MCH 10/05/2021 32.1  26.6 - 33.0 pg Final   • MCHC 10/05/2021 33.9  31.5 - 35.7 g/dL Final   • RDW 10/05/2021 11.8* 12.3 - 15.4 % Final   • RDW-SD 10/05/2021 40.5  37.0 - 54.0 fl Final   • MPV 10/05/2021 9.9  6.0 - 12.0 fL Final   • Platelets 10/05/2021 206  140 - 450 10*3/mm3 Final   • Neutrophil % 10/05/2021 46.0  42.7 - 76.0 % Final   • Lymphocyte % 10/05/2021 44.7  19.6 - 45.3 % Final   • Monocyte % 10/05/2021 6.5  5.0 - 12.0 % Final   • Eosinophil % 10/05/2021 2.0  0.3 - 6.2 % Final   • Basophil % 10/05/2021 0.5  0.0 - 1.5 % Final   • Immature Grans % 10/05/2021 0.3  0.0 - 0.5 % Final   • Neutrophils,  Absolute 10/05/2021 3.54  1.70 - 7.00 10*3/mm3 Final   • Lymphocytes, Absolute 10/05/2021 3.44* 0.70 - 3.10 10*3/mm3 Final   • Monocytes, Absolute 10/05/2021 0.50  0.10 - 0.90 10*3/mm3 Final   • Eosinophils, Absolute 10/05/2021 0.15  0.00 - 0.40 10*3/mm3 Final   • Basophils, Absolute 10/05/2021 0.04  0.00 - 0.20 10*3/mm3 Final   • Immature Grans, Absolute 10/05/2021 0.02  0.00 - 0.05 10*3/mm3 Final   • nRBC 10/05/2021 0.0  0.0 - 0.2 /100 WBC Final   • Phosphorus 10/05/2021 2.8  2.5 - 4.5 mg/dL Final   • Glucose 10/05/2021 103  70 - 130 mg/dL Final    Meter: [id number] : [id number] Zak Terrell   • Magnesium 10/05/2021 1.5* 1.6 - 2.4 mg/dL Final   • Glucose 10/05/2021 129  70 - 130 mg/dL Final    Meter: [id number] : [id number] Zak Terrell   • CA 19-9 10/05/2021 1,829.0* <=35.0 U/mL Final   • ALPHA-FETOPROTEIN 10/05/2021 4.88  0 - 8.3 ng/mL Final   • CEA 10/05/2021 48.90  ng/mL Final   • Glucose 10/05/2021 110  70 - 130 mg/dL Final    Meter: [id number] : [id number] Golden Linares   • Glucose 10/05/2021 138* 70 - 130 mg/dL Final    Meter: [id number] : [id number] Roshan Wade   • Glucose 10/06/2021 88  65 - 99 mg/dL Final   • BUN 10/06/2021 24* 8 - 23 mg/dL Final   • Creatinine 10/06/2021 1.32* 0.76 - 1.27 mg/dL Final   • Sodium 10/06/2021 137  136 - 145 mmol/L Final   • Potassium 10/06/2021 4.5  3.5 - 5.2 mmol/L Final   • Chloride 10/06/2021 106  98 - 107 mmol/L Final   • CO2 10/06/2021 19.0* 22.0 - 29.0 mmol/L Final   • Calcium 10/06/2021 9.1  8.6 - 10.5 mg/dL Final   • Total Protein 10/06/2021 6.8  6.0 - 8.5 g/dL Final   • Albumin 10/06/2021 3.70  3.50 - 5.20 g/dL Final   • ALT (SGPT) 10/06/2021 75* 1 - 41 U/L Final   • AST (SGOT) 10/06/2021 77* 1 - 40 U/L Final   • Alkaline Phosphatase 10/06/2021 131* 39 - 117 U/L Final   • Total Bilirubin 10/06/2021 0.3  0.0 - 1.2 mg/dL Final   • eGFR Non African Amer 10/06/2021 54* >60 mL/min/1.73 Final   • Globulin 10/06/2021 3.1  gm/dL Final   • A/G Ratio 10/06/2021  1.2  g/dL Final   • BUN/Creatinine Ratio 10/06/2021 18.2  7.0 - 25.0 Final   • Anion Gap 10/06/2021 12.0  5.0 - 15.0 mmol/L Final   • Glucose 10/06/2021 94  70 - 130 mg/dL Final    Meter: [id number] : [id number] Sindhu Braun   • Case Report 10/06/2021    Final                    Value:Surgical Pathology Report                         Case: [id number]                                  Authorizing Provider:  Obed Galvez MD        Collected:           10/06/2021 08:39 AM          Ordering Location:     [facility]   Received:            10/06/2021 11:16 AM                                 ENDO SUITES                                                                  Pathologist:           Fabian Kaur MD                                                           Specimens:   1) - Gastric, Antrum, ANTRUM BX                                                                     2) - Esophagus, ESOPHAGEAL BX AT 38CM                                                               3) - Esophagus, ESOPHAGUS AT 35CM                                                         • Clinical Information 10/06/2021    Final                    Value:This result contains rich text formatting which cannot be displayed here.   • Final Diagnosis 10/06/2021    Final                    Value:This result contains rich text formatting which cannot be displayed here.   • Comment 10/06/2021    Final                    Value:This result contains rich text formatting which cannot be displayed here.   • Gross Description 10/06/2021    Final                    Value:This result contains rich text formatting which cannot be displayed here.   • Microscopic Description 10/06/2021    Final                    Value:This result contains rich text formatting which cannot be displayed here.   • Glucose 10/06/2021 182* 70 - 130 mg/dL Final    Meter: [id number] : [id number] Micheal Monica   Lab on 10/01/2021   Component Date Value Ref Range  Status   • Hemoglobin A1C 10/01/2021 6.10* 4.80 - 5.60 % Final   • Glucose 10/01/2021 94  65 - 99 mg/dL Final   • BUN 10/01/2021 40* 8 - 23 mg/dL Final   • Creatinine 10/01/2021 1.97* 0.76 - 1.27 mg/dL Final   • Sodium 10/01/2021 137  136 - 145 mmol/L Final   • Potassium 10/01/2021 5.1  3.5 - 5.2 mmol/L Final   • Chloride 10/01/2021 103  98 - 107 mmol/L Final   • CO2 10/01/2021 20.9* 22.0 - 29.0 mmol/L Final   • Calcium 10/01/2021 10.0  8.6 - 10.5 mg/dL Final   • Total Protein 10/01/2021 7.2  6.0 - 8.5 g/dL Final   • Albumin 10/01/2021 4.40  3.50 - 5.20 g/dL Final   • ALT (SGPT) 10/01/2021 37  1 - 41 U/L Final   • AST (SGOT) 10/01/2021 38  1 - 40 U/L Final   • Alkaline Phosphatase 10/01/2021 109  39 - 117 U/L Final   • Total Bilirubin 10/01/2021 0.4  0.0 - 1.2 mg/dL Final   • eGFR Non African Amer 10/01/2021 34* >60 mL/min/1.73 Final   • Globulin 10/01/2021 2.8  gm/dL Final   • A/G Ratio 10/01/2021 1.6  g/dL Final   • BUN/Creatinine Ratio 10/01/2021 20.3  7.0 - 25.0 Final   • Anion Gap 10/01/2021 13.1  5.0 - 15.0 mmol/L Final   • Total Cholesterol 10/01/2021 122  0 - 200 mg/dL Final   • Triglycerides 10/01/2021 158* 0 - 150 mg/dL Final   • HDL Cholesterol 10/01/2021 31* 40 - 60 mg/dL Final   • LDL Cholesterol  10/01/2021 64  0 - 100 mg/dL Final   • VLDL Cholesterol 10/01/2021 27  5 - 40 mg/dL Final   • LDL/HDL Ratio 10/01/2021 1.92   Final   • PSA 10/01/2021 3.160  0.000 - 4.000 ng/mL Final   • WBC 10/01/2021 10.63  3.40 - 10.80 10*3/mm3 Final   • RBC 10/01/2021 3.56* 4.14 - 5.80 10*6/mm3 Final   • Hemoglobin 10/01/2021 11.4* 13.0 - 17.7 g/dL Final   • Hematocrit 10/01/2021 34.4* 37.5 - 51.0 % Final   • MCV 10/01/2021 96.6  79.0 - 97.0 fL Final   • MCH 10/01/2021 32.0  26.6 - 33.0 pg Final   • MCHC 10/01/2021 33.1  31.5 - 35.7 g/dL Final   • RDW 10/01/2021 12.2* 12.3 - 15.4 % Final   • RDW-SD 10/01/2021 43.1  37.0 - 54.0 fl Final   • MPV 10/01/2021 10.5  6.0 - 12.0 fL Final   • Platelets 10/01/2021 261  140 - 450  10*3/mm3 Final   • Neutrophil % 10/01/2021 40.0* 42.7 - 76.0 % Final   • Lymphocyte % 10/01/2021 50.3* 19.6 - 45.3 % Final   • Monocyte % 10/01/2021 7.1  5.0 - 12.0 % Final   • Eosinophil % 10/01/2021 1.7  0.3 - 6.2 % Final   • Basophil % 10/01/2021 0.6  0.0 - 1.5 % Final   • Immature Grans % 10/01/2021 0.3  0.0 - 0.5 % Final   • Neutrophils, Absolute 10/01/2021 4.25  1.70 - 7.00 10*3/mm3 Final   • Lymphocytes, Absolute 10/01/2021 5.35* 0.70 - 3.10 10*3/mm3 Final   • Monocytes, Absolute 10/01/2021 0.76  0.10 - 0.90 10*3/mm3 Final   • Eosinophils, Absolute 10/01/2021 0.18  0.00 - 0.40 10*3/mm3 Final   • Basophils, Absolute 10/01/2021 0.06  0.00 - 0.20 10*3/mm3 Final   • Immature Grans, Absolute 10/01/2021 0.03  0.00 - 0.05 10*3/mm3 Final   • nRBC 10/01/2021 0.0  0.0 - 0.2 /100 WBC Final   Office Visit on 09/30/2021   Component Date Value Ref Range Status   • Color 09/30/2021 Dark Yellow  Yellow, Straw, Dark Yellow, Kerri Final   • Clarity, UA 09/30/2021 Clear  Clear Final   • Specific Gravity  09/30/2021 1.020  1.005 - 1.030 Final   • pH, Urine 09/30/2021 5.5  5.0 - 8.0 Final   • Leukocytes 09/30/2021 Small (1+)* Negative Final   • Nitrite, UA 09/30/2021 Negative  Negative Final   • Protein, POC 09/30/2021 1+* Negative mg/dL Final   • Glucose, UA 09/30/2021 Negative  Negative, 1000 mg/dL (3+) mg/dL Final   • Ketones, UA 09/30/2021 Trace* Negative Final   • Urobilinogen, UA 09/30/2021 Normal  Normal Final   • Bilirubin 09/30/2021 Small (1+)* Negative Final   • Blood, UA 09/30/2021 Negative  Negative Final   • PSA 09/30/2021 3.230  0.000 - 4.000 ng/mL Final    Results may be falsely decreased if patient taking Biotin.       CT Abdomen Pelvis Without Contrast    Result Date: 10/7/2021  Narrative: EXAMINATION: CT ABDOMEN PELVIS WO CONTRAST- 10/05/2021  INDICATION: Nausea/vomiting; R42-Dizziness and giddiness; R53.1-Weakness; Z86.73-Personal history of transient ischemic attack (TIA), and cerebral infarction without  residual deficits; N17.9-Acute kidney failure, unspecified; I95.1-Orthostatic hypotension; R63.4-Abnormal weight loss; R53.1-Weakness  TECHNIQUE: 5 mm unenhanced images through the abdomen and pelvis.  The radiation dose reduction device was turned on for each scan per the ALARA (As Low as Reasonably Achievable) protocol.  COMPARISON: NONE  FINDINGS: History indicates nausea, vomiting, unexplained weight loss, generalized weakness.  The included lung bases appear grossly clear. There is a water density 12 mm left lobe liver cyst. In addition, there is a heterogeneous appearance of the liver parenchyma, particularly in segments 4A, 4B, segment 7 and segment 8. This is potentially due to irregular fatty liver change, but the possibility of metastatic disease is a consideration as well. Liver capsule appears slightly irregular. Gallbladder is decompressed and otherwise unremarkable in appearance. A few granulomatous calcifications are seen in the otherwise normal-appearing spleen. No significant abnormalities are noted of the pancreas, adrenal glands, or kidneys for a non-IV contrast enhanced exam. No upper abdominal adenopathy, ascites, or acute inflammatory change is seen.  Regarding the lower abdomen and pelvis, no ascites, adenopathy or acute inflammatory change is seen. There is streak artifact from patient's right hip prosthesis. Review of the colon shows at least no gross evidence of mass or inflammation. Bladder is obscured by streak artifact but apparently nondistended. Terminal ileum cecum and appendix appear grossly normal. Bony structures appear to be intact.      Impression: 1. Small water density left lobe liver cyst, and also multiple other areas of heterogeneous liver parenchyma of uncertain significance. Unusual pattern of fatty liver change, and infiltration by tumor are considerations. Correlation with patient's serum liver chemistries is needed. Follow-up with contrast-enhanced liver protocol CT  scan or liver MRI is suggested for better characterization of disease.  2. No evidence of acute inflammatory process, bowel obstruction or other clearly acute intra-abdominal or intrapelvic disease elsewhere.  D: 10/05/2021 E: 10/05/2021  This report was finalized on 10/7/2021 9:57 PM by Dr. Adriano Lopez MD.      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  COMPARISON: Noncontrast exam performed one day prior  FINDINGS: CT chest: No pathologic axillary adenopathy or other worrisome body wall soft tissue finding in the chest. No pleural or pericardial effusion. No pathologic mediastinal or hilar lymphadenopathy. Minimally atherosclerotic nonaneurysmal thoracic aorta. Evaluation of the osseous structures demonstrates no evidence of acute fracture or aggressive lytic or sclerotic osseous lesion. Right hip arthroplasty noted. There is nonspecific minimal eccentric wall thickening of the distal esophagus, possibly correlating to reported primary esophageal malignancy. Evaluation of the lung fields demonstrates no evidence of acute infectious or inflammatory process. There are no distinct suspicious pulmonary nodules.  CT abdomen  pelvis: Body wall soft tissues are unremarkable. There are extensive bilateral partially enhancing hepatic parenchymal lesions compatible with diffuse metastatic involvement, with the largest conglomerate multilobular lesion near the right hepatic dome measuring 8.2 x 6.2 cm. The gallbladder is unremarkable. There is no biliary ductal dilatation. The spleen, pancreas and bilateral adrenal glands are homogeneous without evidence of additional suspicious focal lesion. The kidneys demonstrate symmetric nephrogram and contrast excretion without evidence of hydronephrosis or contour deforming mass. Small and large bowel loops are nondilated. There is no suspicious focal bowel wall thickening. The appendix is normal. The pelvic viscera are unremarkable. There is no bulky retroperitoneal lymphadenopathy. Mildly atherosclerotic nonaneurysmal abdominal aorta. No free fluid or pneumoperitoneum.       Impression: No evidence of metastatic disease in the chest. Some minimally eccentric wall thickening of the distal esophagus is noted, possibly correlating to reported known primary lesion.  Extensive bilateral hypoenhancing liver masses, largest conglomerate lesion near the right hepatic dome consistent with likely extensive hepatic parenchymal metastatic disease. No additional evidence of metastatic involvement in the abdomen and pelvis. No additional acute findings.   This report was finalized on 10/6/2021 2:31 PM by Tenzin Warren.      XR Chest 1 View    Result Date: 10/5/2021  Narrative: EXAMINATION: XR CHEST 1 VW-  INDICATION: Weak, dizzy, AMS triage protocol.  COMPARISON: 06/29/2021.  FINDINGS: Heart, mediastinum and pulmonary vasculature appear within normal limits. Mild coarsening of the pulmonary interstitial markings appears stable from the prior exam, apparently chronic. No new pulmonary parenchymal disease, evidence of edema, effusion or pneumothorax is seen.      Impression: No new chest disease.   D:  10/04/2021  E:  10/05/2021  This report was finalized on 10/5/2021 9:42 PM by Dr. Adriano Lopez MD.        Assessment / Plan      Assessment/Plan:   1. Esophageal adenocarcinoma (HCC) (Primary)  -Diagnosed during his recent hospitalization  -CT C/A/P concerning for metastatic disease within the liver  -Discussed his diagnosis, prognosis, and treatment plan with patient and his friend today  -Discussed that he has stage IV disease and that his cancer is incurable but could still be treated with chemotherapy and targeted treatment  -Discussed the chemotherapy would use would be FOLFOX plus/minus targeted treatment.  Discussed side effects including but not limited to immunosuppression, nausea, vomiting, diarrhea, cold sensitivity, neuropathy, fatigue  -Discussed other options including hospice/palliative care  -Patient would like to discuss this further with his friend and family before making a treatment decision  -We will plan to send for next generation sequencing using [employer] reveal    2.  Nausea/vomiting  -Secondary to his malignancy  -Not significantly improved with Reglan  -We will start as needed Zofran today  -Okay to continue pantoprazole at this time    3.  CVA  -History of stroke in June 2021 status post TPA  -Continue baby aspirin and Xarelto  -Continue Crestor    Other orders  -     ondansetron ODT (ZOFRAN-ODT) 8 MG disintegrating tablet; Place 1 tablet on the tongue Every 8 (Eight) Hours As Needed for Nausea or Vomiting for up to 60 doses.  Dispense: 60 tablet; Refill: 3    Follow Up:   Follow-up pending treatment decision per patient and family     Sudhakar Ernandez MD  Hematology and Oncology     Please note that portions of this note may have been completed with a voice recognition program. Efforts were made to edit the dictations, but occasionally words are mistranscribed.

## 2021-10-12 ENCOUNTER — CALL CENTER PROGRAMS (OUTPATIENT)
Dept: CALL CENTER | Facility: HOSPITAL | Age: 66
End: 2021-10-12

## 2021-10-12 DIAGNOSIS — C15.9 ESOPHAGEAL ADENOCARCINOMA (HCC): ICD-10-CM

## 2021-10-12 DIAGNOSIS — Z95.828 PORT-A-CATH IN PLACE: Primary | ICD-10-CM

## 2021-10-12 DIAGNOSIS — C15.9 ESOPHAGEAL ADENOCARCINOMA (HCC): Primary | ICD-10-CM

## 2021-10-12 RX ORDER — LIDOCAINE AND PRILOCAINE 25; 25 MG/G; MG/G
1 CREAM TOPICAL AS NEEDED
Qty: 30 G | Refills: 3 | Status: SHIPPED | OUTPATIENT
Start: 2021-10-12

## 2021-10-12 NOTE — TELEPHONE ENCOUNTER
Advised we have placed the order for the port and sent for scheduling. Patient would like to do infusions in [place]. Advised Yolis we will be in contact with more information regarding appointment times. Verbalized understanding.

## 2021-10-12 NOTE — TELEPHONE ENCOUNTER
Caller: LAYNE    Relationship: FAMILY    Best call back number: [phone]    What was the call regarding: LAYNE CALLED TO SAY THAT DESMOND HAS DECIDED TO GET CHEMO. SHE SAYS HE NEEDS TO GET HIS PORT PUT IN.    Do you require a callback: YES

## 2021-10-12 NOTE — OUTREACH NOTE
Stroke Hurt Survey      Responses   Facility patient discharged from? [facility]   Attempt successful Yes   Call start time 0927   Person spoke with today (if not patient) and relationship Patient   Did the patient die within 30 days of admission? No   Did the patient die within 30 days of discharge? No   Call end time 0930   Patient location 30 days post discharge if known Home   Was the patient readmitted within 30 days of discharge? No   Could you live alone without any help from another person? Yes   Can you do everything that you were doing right before your stroke even if slower and not as much? Yes   Are you completely back to the way you were right before your stroke? Yes   Can you walk from one room to another without help from another person? Yes   Can the patient sit up in bed without any help? Yes   Call Center Hurt Score 0   Hurt score call completed Yes   Comments Reports no residual stroke symptoms.  He is able to manage all his usual daily activites.          Shannon Garvey RN

## 2021-10-14 ENCOUNTER — TELEPHONE (OUTPATIENT)
Dept: ONCOLOGY | Facility: CLINIC | Age: 66
End: 2021-10-14

## 2021-10-14 ENCOUNTER — DOCUMENTATION (OUTPATIENT)
Dept: NUTRITION | Facility: HOSPITAL | Age: 66
End: 2021-10-14

## 2021-10-14 NOTE — TELEPHONE ENCOUNTER
Caller: LAYNE    Relationship to patient:FRIEND    Best call back number: [phone]    Type of visit: CHEMO EDUCATION    Requested date: EARLY IN THE MORNING OR LATE IN THE DAY    If rescheduling, when is the original appointment: 10/21 @ 11AM     Additional notes: SHE ALSO IS REQUESTING FOR SOMEONE TO GO IN WITH HIM FOR THE EDUCATION. SHE SAYS HE MAY NOT UNDERSTAND FULLY/MAY NOT BE ABLE TO COMMUNICATE WELL

## 2021-10-14 NOTE — PROGRESS NOTES
"   Outpatient Oncology Nutrition     Reason for Visit:     Oncology Nutrition Screening, Nutritional Assessment and Patient Education    Patient Name:  Andry Potter    :  1955    MRN:  [id number]    Date of Encounter: 10/14/2021    Nutrition Assessment     Diagnosis: Metastatic esophageal adenocarcinoma / liver involvement     Chemotherapy:  OP GE [id number] OXALIplatin / Leucovorin / Fluorouracil - 12 cycles - start date 10/19/21    Radiation: Not a candiate for radiation     Patient Active Problem List   Diagnosis   • Abnormal stress test   • Type 2 diabetes mellitus (HCC)   • Essential hypertension   • Dyslipidemia   • CAD (coronary artery disease)   • Obesity (BMI 30-39.9)   • Snoring   • Moderate obstructive sleep apnea   • Acute ischemic right MCA stroke, status post TPA and right thrombectomy    • CKD (chronic kidney disease) stage 2, GFR 60-89 ml/min   • Acute ischemic right middle cerebral artery (MCA) stroke (HCC)   • Moderate malnutrition (CMS/HCC)   • Abnormal CT of the abdomen   • Esophageal mass   • Esophageal adenocarcinoma (HCC)       Food / Nutrition Related History       Hydration Status     Goal:  90+ ounces per day    Enteral Feeding   NA    Anthropometric Measurements     Height:    Ht Readings from Last 1 Encounters:   10/11/21 177.8 cm (70\")       Weight:    Wt Readings from Last 1 Encounters:   10/11/21 88.5 kg (195 lb)       BMI: 27.95 / overweight    Weight Change: Weight loss 40 lbs over the past several months / unintentional    Review of Lab Data (Time Frame - 1 month / 2 month)   Reviewed, noting elevated liver enzymes    Medication Review   Reviewed noting Remeron, Reglan, Protonix    Nutrition Focused Physical Findings       Nutrition Impact Symptoms     Post prandial nausea  Nausea2  Food aversion    Physical Activity      Able to do little activity and spend most of the day in bed or chair    Current Nutritional Intake     Oral diet:  Regular with modification of " consistencies    Oral nutritional supplements: Ensure supplements     Malnutrition Risk Assessment     Recent weight loss over the past 6 months:  Yes    How much weight loss:  4 = 34 or more lbs    Eating poorly because of a decreased appetite:  1 = Yes    Malnutrition Screening Score:     MST = 2 more Patient at risk for malnutrition     Nutrition Diagnosis     Problem Severe malnutrition in context of chronic illness   Etiology Diagnosis related; food aversion   Signs / Symptoms 40+ lbs weight loss   Review of oral intake     Nutrition Intervention   10/14/21 Phone call to patient's friend, who is his primary caregiver.  No answer; VM left requesting return phone call to address nutritional issues / concerns that patient is presently experiencing secondary to diagnosis.  I will be out of the office 10/15/21 and if unable to communicate with patient and his friend today, the [facility] RD will follow up.     Goal   1. To maintain weight and prevent additional weight loss during chemo.  2. Achieve nutrient and hydration goals via oral intake and / or alternate source of nutritional support.  3. Provide patient education for management of nutritionally related side effects of treatment.    Monitoring / Evaluation

## 2021-10-16 ENCOUNTER — READMISSION MANAGEMENT (OUTPATIENT)
Dept: CALL CENTER | Facility: HOSPITAL | Age: 66
End: 2021-10-16

## 2021-10-16 NOTE — OUTREACH NOTE
Medical Week 2 Survey      Responses   [facility] patient discharged from? [facility]   Does the patient have one of the following disease processes/diagnoses(primary or secondary)? Other   Week 2 attempt successful? Yes   Call start time 1805   Discharge diagnosis s/p  EGD showing esophageal mass    Call end time 1806   Meds reviewed with patient/caregiver? Yes   Is the patient having any side effects they believe may be caused by any medication additions or changes? No   Does the patient have all medications ordered at discharge? Yes   Is the patient taking all medications as directed (includes completed medication regime)? Yes   Does the patient have a primary care provider?  Yes   Does the patient have an appointment with their PCP within 7 days of discharge? Yes   Has the patient kept scheduled appointments due by today? Yes   Has home health visited the patient within 72 hours of discharge? N/A   Psychosocial issues? No   Did the patient receive a copy of their discharge instructions? Yes   Nursing interventions Reviewed instructions with patient   What is the patient's perception of their health status since discharge? Improving   Is the patient/caregiver able to teach back signs and symptoms related to disease process for when to call PCP? Yes   Is the patient/caregiver able to teach back signs and symptoms related to disease process for when to call 911? Yes   Is the patient/caregiver able to teach back the hierarchy of who to call/visit for symptoms/problems? PCP, Specialist, Home health nurse, Urgent Care, ED, 911 Yes   Week 2 Call Completed? Yes          Sherri Mart RN

## 2021-10-19 ENCOUNTER — TELEPHONE (OUTPATIENT)
Dept: FAMILY MEDICINE CLINIC | Facility: CLINIC | Age: 66
End: 2021-10-19

## 2021-10-19 DIAGNOSIS — G47.00 INSOMNIA, UNSPECIFIED TYPE: Primary | ICD-10-CM

## 2021-10-19 NOTE — TELEPHONE ENCOUNTER
Unsure if this was discussed at recent office visit or if pt needs appt? Please advise, Thanks  Attempted to contact patient; No answer; Left voicemail for patient to return call with office number given.

## 2021-10-19 NOTE — TELEPHONE ENCOUNTER
Pt's caregiver Yolis returned call. Stated with pt's new dx of cancer. Pts mind has been on a lot of things and he cant seem to get to sleep. Stated its mainly hard to get to sleep but there has been times pt wakes up at 2 or sometime during the night and then cant go back to sleep. Please advise, Thanks

## 2021-10-19 NOTE — TELEPHONE ENCOUNTER
Would he be willing to increase the dose of his Remeron to 30 mg?  This can help with his sleep and potentially boost his appetite.

## 2021-10-19 NOTE — TELEPHONE ENCOUNTER
Called and spoke to pt. Informed of providers advise, pt is agreeable to increasing remeron to 30mg . Stated he will take 2 of his 15mg tonight and will let us know how it goes. No further questions/concerns at this time.

## 2021-10-19 NOTE — TELEPHONE ENCOUNTER
Caller: Andry Potter    Relationship: Self    Best call back number: [phone]    What medication are you requesting:    What are your current symptoms: INSOMNIA     How long have you been experiencing symptoms:     Have you had these symptoms before:    [] Yes  [x] No    Have you been treated for these symptoms before:   [] Yes  [x] No    If a prescription is needed, what is your preferred pharmacy and phone number:      [facility] [place] [address] [address] [facility] [phone] [facility] [phone] [facility]        Additional notes:    \

## 2021-10-21 ENCOUNTER — OFFICE VISIT (OUTPATIENT)
Dept: ONCOLOGY | Facility: CLINIC | Age: 66
End: 2021-10-21

## 2021-10-21 ENCOUNTER — TELEPHONE (OUTPATIENT)
Dept: ONCOLOGY | Facility: CLINIC | Age: 66
End: 2021-10-21

## 2021-10-21 VITALS
TEMPERATURE: 97.1 F | OXYGEN SATURATION: 99 % | BODY MASS INDEX: 26.77 KG/M2 | DIASTOLIC BLOOD PRESSURE: 59 MMHG | WEIGHT: 187 LBS | HEIGHT: 70 IN | RESPIRATION RATE: 16 BRPM | SYSTOLIC BLOOD PRESSURE: 107 MMHG | HEART RATE: 86 BPM

## 2021-10-21 DIAGNOSIS — F41.9 ANXIETY: Primary | ICD-10-CM

## 2021-10-21 DIAGNOSIS — C15.9 ESOPHAGEAL ADENOCARCINOMA (HCC): Primary | ICD-10-CM

## 2021-10-21 DIAGNOSIS — C15.9 ESOPHAGEAL ADENOCARCINOMA (HCC): ICD-10-CM

## 2021-10-21 DIAGNOSIS — F32.A DEPRESSION, UNSPECIFIED DEPRESSION TYPE: ICD-10-CM

## 2021-10-21 PROCEDURE — 99215 OFFICE O/P EST HI 40 MIN: CPT | Performed by: NURSE PRACTITIONER

## 2021-10-21 PROCEDURE — G2212 PROLONG OUTPT/OFFICE VIS: HCPCS | Performed by: NURSE PRACTITIONER

## 2021-10-21 RX ORDER — MIRTAZAPINE 30 MG/1
30 TABLET, FILM COATED ORAL NIGHTLY
COMMUNITY

## 2021-10-21 NOTE — PROGRESS NOTES
CHEMOTHERAPY PREPARATION    Andry Potter  [id number]  1955    Subjective   Chief Complaint: Treatment Preparation and Needs Assessment    History of present illness:  Andry Potter is a 66 y.o. year old male who is here today for chemotherapy preparation and needs assessment. The patient has been diagnosed with esophageal adenocarcinoma   and is scheduled to begin  IV treatment with FOLFOX and opdivo .     Oncology History:    Oncology/Hematology History   Esophageal adenocarcinoma (HCC)   10/11/2021 Initial Diagnosis    Esophageal adenocarcinoma (HCC)     10/11/2021 Cancer Staged    Staging form: Esophagus - Adenocarcinoma, AJCC 8th Edition  - Clinical: Stage IVB (cM1) - Signed by Sudhakar Ernandez MD on 10/11/2021     11/1/2021 -  Chemotherapy    OP GE mFOLFOX6 OXALIplatin / Leucovorin / Fluorouracil         The current medication list and allergy list were reviewed and reconciled.     Past Medical History, Past Surgical History, Social History, Family History have been reviewed and are without significant changes except as mentioned.      Review of Systems   Constitutional: Positive for activity change and fatigue. Negative for appetite change and fever.   HENT: Negative for congestion, dental problem, ear pain, mouth sores and nosebleeds.         Thrush- on nystatin   Eyes: Negative for photophobia, discharge, itching and visual disturbance.   Respiratory: Negative for apnea, cough, choking and wheezing.    Cardiovascular: Negative for chest pain, palpitations and leg swelling.   Gastrointestinal: Positive for nausea. Negative for abdominal distention, abdominal pain, blood in stool, constipation, diarrhea and vomiting.   Endocrine: Negative for cold intolerance, heat intolerance and polyuria.   Genitourinary: Negative for decreased urine volume, difficulty urinating, dysuria, flank pain, frequency, hematuria and testicular pain.   Musculoskeletal: Negative for arthralgias, back pain, myalgias and neck  "pain.   Skin: Negative for color change and pallor.   Allergic/Immunologic: Negative for environmental allergies and food allergies.   Neurological: Positive for weakness. Negative for dizziness, seizures, facial asymmetry and numbness.   Hematological: Negative for adenopathy. Does not bruise/bleed easily.   Psychiatric/Behavioral: Negative for agitation, behavioral problems and hallucinations. The patient is nervous/anxious.        Objective   Physical Exam  Vital Signs: /59   Pulse 86   Temp 97.1 °F (36.2 °C) (Temporal)   Resp 16   Ht 177.8 cm (70\")   Wt 84.8 kg (187 lb)   SpO2 99%   BMI 26.83 kg/m²    General Appearance:  alert, cooperative, no apparent distress and appears stated age   Neurologic/Psychiatric: A&O x 3, gait steady, appropriate affect   HEENT:  Normocephalic, without obvious abnormality, mucous membranes moist   Lungs:   Clear to auscultation bilaterally; respirations regular, even, and unlabored bilaterally   Heart:  Regular rate and rhythm, no murmurs appreciated   Extremities: Normal, atraumatic; no clubbing, cyanosis, or edema    Skin: No rashes, lesions, or abnormal coloration noted     ECOG Performance Status: 1 - Symptomatic but completely ambulatory            NEEDS ASSESSMENTS    Genetics  The patient's new diagnosis and family history have been reviewed for genetic counseling needs. A genetic referral is not recommended.     Psychosocial  The patient has completed a PHQ-9 Depression Screening and the Distress Thermometer (DT) today.   PHQ-9 results show 20-27 (Severe Depression). The patient scored their distress today as 10 on a scale of 0-10 with 0 being no distress and 10 being extreme distress.   Problems marked as being an issue for him within the last week include practical problems, emotional problems and physical problems.   Results were reviewed along with psychosocial resources offered by our cancer center. Our oncology social worker will be flagged for a DT score " "of 4 or above, and a same day call will be made for a score of 9 or 10. A mental health referral is recommended at this time. The patient is accepting of a referral to [facility].   Copies of patient's questionnaires will be scanned into EMR for details and further reference.    Barriers to care  A barriers form was also completed by the patient today. We discussed services offered by our facility to help him have adequate access to care. The patient was given the name and card for our . Based upon barriers assessment today, the patient will require a follow-up call from the  to further discuss needs.   A copy of the barriers form will also be scanned into EMR for details and further reference.     VAD Assessment  The patient and I discussed planned intervenous chemotherapy as well as other IV treatments that are often needed throughout the course of treatment. These may include, but are not limited to blood transfusions, antibiotics, and IV hydration. The patient's vasculature does not appear to be adequate for multiple peripheral IVs throughout their treatment course. Discussed risks and benefits of VADs. The patient would like to pursue Port-A-Cath insertion prior to initiation of treatment.       Advance Care Planning   The patient and I discussed advanced care planning, \"Conversations that Matter\".   This service was offered, free of charge, for development of advance directives with a certified ACP facilitator.  The patient does have an up-to-date advanced directive. This document is on file with our office. The patient is not interested in an appointment with one of our facilitators to create or update their advanced directives.         Palliative Care  The patient and I discussed palliative care services. Palliative care is not the same as Hospice care. This is specialized medical care for people living with serious illness with the goal of improving quality of life for the " patient and their family. [facility] has partnered with [facility] to offer our patients outpatient palliative care early along with their treatment to assist in coordination of care, symptom management, pain management, and medical decision making.  Oncology criteria for palliative care referral is met at this time. The patient is not interested in a palliative care consultation.     Additional Referral needs  Nutrition  Social work  Financial  Psychiatric nurse practitioner      IV CHEMOTHERAPY EDUCATION    Booklets Given: Chemotherapy and You [x]  Eating Hints [x]    Sexuality/Fertility Books []      Chemotherapy/Biotherapy Education Sheets: (list all that apply)  nausea management, acid reflux management, diarrhea management, Cancer resourse contacts information, skin and mouth care and vaccination information                                                                                                                                                                 Chemotherapy Regimen:   Treatment Plans     Name Type Plan Dates Plan Provider         Active    OP GE mFOLFOX6 OXALIplatin / Leucovorin / Fluorouracil ONCOLOGY TREATMENT  10/18/2021 - Present Sudhakar Ernandez MD                    TOPICS EDUCATION PROVIDED COMMENTS   ANEMIA:  role of RBC, cause, s/s, ways to manage, role of transfusion [x]    THROMBOCYTOPENIA:  role of platelet, cause, s/s, ways to prevent bleeding, things to avoid, when to seek help [x]    NEUTROPENIA:  role of WBC, cause, infection precautions, s/s of infection, when to call MD [x]    NUTRITION & APPETITE CHANGES:  importance of maintaining healthy diet & weight, ways to manage to improve intake, dietary consult, exercise regimen [x]    DIARRHEA:  causes, s/s of dehydration, ways to manage, dietary changes, when to call MD [x]    CONSTIPATION:  causes, ways to manage, dietary changes, when to call MD [x]    NAUSEA & VOMITING:  cause, use of antiemetics, dietary  changes, when to call MD [x]    MOUTH SORES:  causes, oral care, ways to manage [x]    ALOPECIA:  cause, ways to manage, resources [x]    INFERTILITY & SEXUALITY:  causes, fertility preservation options, sexuality changes, ways to manage, importance of birth control []    NERVOUS SYSTEM CHANGES:  causes, s/s, neuropathies, cognitive changes, ways to manage [x]    PAIN:  causes, ways to manage [x]    SKIN & NAIL CHANGES:  cause, s/s, ways to manage [x]    ORGAN TOXICITIES:  cause, s/s, need for diagnostic tests, labs, when to notify MD [x]    SURVIVORSHIP:  distress, distress assessment, secondary malignancies, early/late effects, follow-up, social issues, social support [x]    HOME CARE:  use of spill kits, how to manage bodily fluids [x]    MISCELLANEOUS:  drug interactions, administration, vesicant, et [x]            Assessment and Plan:    Diagnoses and all orders for this visit:    1. Anxiety (Primary)  -     Ambulatory Referral to Psychiatry  -     Ambulatory Referral to Psychiatry  -     Ambulatory Referral to Oncology Social Worker    2. Depression, unspecified depression type  -     Ambulatory Referral to Psychiatry  -     Ambulatory Referral to Psychiatry  -     Ambulatory Referral to Oncology Social Worker    3. Esophageal adenocarcinoma (HCC)  -     Ambulatory Referral to Psychiatry  -     Ambulatory Referral to Oncology Social Worker        This was a 90 minute face-to-face visit with 85 minutes spent in  counseling and coordination of care as documented above.   The patient and I have reviewed their new cancer diagnosis and scheduled treatment plan. Needs assessment was completed including genetics, psychosocial needs, barriers to care, VAD evaluation, advanced care planning, and palliative care services. Referrals have been ordered as appropriate based upon our evaluation and patient desires.     IV and oral chemotherapy teaching was also completed today as documented above. Adequate time was given to  answer all questions to his satisfaction. Patient and family are aware of their care team members and contact information if they have questions or problems throughout the treatment course. Needs assessments and education has been completed. The patient is adequately prepared to begin treatment as scheduled.     Zofran was sent to the pharmacy.  We discussed he will take 1 pill every 8 hours as needed.  He will continue pantoprazole as well as Reglan to help with nausea and vomiting.  EMLA cream was also sent to the pharmacy previously.  We discussed how to use.    I will consult financial counseling, social work, dietary, and psychiatric nurse practitioner.  Financial counseling will see him for possible large co-pays.  Social work to evaluate for community resources.  Psychiatric nurse practitioner for depression score of 22.    He will have port placement on October 26.  We will plan to start infusion on November 1.  He will follow-up with Dr. Ernandez on November 4.            Electronically signed by LIZBETH Blair on 10/21/21 at 13:12 EDT.

## 2021-10-21 NOTE — PATIENT INSTRUCTIONS
Persistent Depressive Disorder, Adult  Persistent depressive disorder (PDD) is a mental health condition that causes symptoms of low-level depression for 2 years or longer. This disorder may also be called long-term (chronic) depression or dysthymia. PDD may include episodes of major depressive disorder (MDD), which is more severe depression that lasts for about 2 weeks.  PDD can affect the way you think, feel, and behave. This condition may also affect your relationships. You may be more likely to get sick if you have PDD.  What are the causes?  The exact cause of this condition is not known. PDD is most likely caused by a combination of things, which may include:  · Your personality traits.  · Your learned or conditioned behaviors, thoughts or feelings that reinforce negativity.  · Substance abuse or misuse.  · How you are able to manage chronic medical or mental health illness you may have.  · Going through a traumatic experience or major life changes.  What increases the risk?  The following factors may also make someone more likely to develop PDD:  · A family history of depression.  · Being a woman.  · Abnormally low levels of certain brain chemicals.  · Traumatic or painful events in childhood, especially abuse or the loss of a parent.  · Chronic stress caused by upsetting life experiences, troubled family relationships, or losses.  · Chronic physical illness or other mental health disorders.  · Cultural stress, such as stress from poor living conditions or discrimination.  What are the signs or symptoms?  Symptoms of this condition may occur for most of the day, and may include:  · Physical symptoms. These may include:  ? Tiredness or low energy.  ? Eating or sleeping too much or too little.  ? Being restless or agitated.  ? Unexplained physical complaints.  · Mental and emotional symptoms. These may be:  ? Feeling hopeless, worthless, or guilty.  ? Anxiety.  ? Trouble focusing or making decisions.  ? Low  self-esteem.  ? Negative outlook.  ? Feeling irritable.  ? Being unable to have fun or feel pleasure.  · Behavioral symptoms. These may include:  ? Withdrawing from others.  ? Aggressive behavior or anger.  How is this diagnosed?  This condition may be diagnosed based on:  · Your symptoms.  · Your medical history, including your mental health history. This may involve tests to evaluate your level of depression. You may be asked questions about your lifestyle, including any drug and alcohol use, and how long you have had symptoms of PDD.  · A physical exam.  · Blood tests to rule out other conditions.  PDD may be diagnosed if you have had the following symptoms:  · A depressed mood or loss of interest in things for 2 years or longer.  · Other symptoms of depression.  How is this treated?  This condition is usually treated by mental health professionals, such as psychologists, psychiatrists, and clinical social workers. You may need more than one type of treatment. Treatment may include:  · Psychotherapy, also called talk therapy or counseling. Types of psychotherapy include:  ? Cognitive behavioral therapy (CBT). This teaches you to recognize unhealthy feelings, thoughts, and behaviors, and to replace them with positive thoughts and actions.  ? Interpersonal therapy (IPT). This helps you to improve the way you relate to and communicate with others.  ? Family therapy. This treatment involves members of your family.  · Medicines to treat anxiety and depression, or to help balance chemicals in your brain that affect emotions and behaviors.  · Lifestyle changes, such as:  ? Limiting alcohol and drug use.  ? Getting regular exercise.  ? Developing a consistent sleep routine.  ? Making healthy eating choices.  ? Spending more time outdoors.  A combination of psychotherapy and medicines is thought to be the most effective form of treatment.  Follow these instructions at home:  Activity    · Exercise regularly and spend time  outdoors as told.  · Find activities that you enjoy doing, and make time to do them.  · Find healthy ways to manage stress, such as:  ? Meditation or deep breathing.  ? Spending time in nature.  ? Journaling.  · Return to your normal activities as told by your health care provider.    Alcohol and drug use  · If you drink alcohol:  ? Limit how much you use to:  § 0-1 drink a day for nonpregnant women.  § 0-2 drinks a day for men.  ? Be aware of how much alcohol is in your drink. In the [place] one drink equals one 12 oz bottle of beer (355 mL), one 5 oz glass of wine (148 mL), or one 1½ oz glass of hard liquor (44 mL).  ? Discuss your alcohol use with your health care provider. Alcohol can affect any antidepressant medicines you are taking.  · Discuss any drug use with your health care provider.  General instructions    · Take over-the-counter and prescription medicines and herbal preparations only as told by your health care provider.  · Eat a healthy diet and get plenty of sleep.  · Consider joining a support group. Your health care provider may be able to recommend one.  · Keep all follow-up visits as told by your health care provider. This is important.    Where to find more information  · National [place] on Mental Illness: www.alonso.org  · [place] National [place] of [place] Health: www.nimh.nih.gov  · American Psychiatric Association: www.psychiatry.org/patients-families  Contact a health care provider if:  · Your symptoms get worse.  · You develop new symptoms.  · You have trouble sleeping or doing your daily activities.  Get help right away if:  · You harm yourself.  · You have serious thoughts about hurting yourself or others.  · You see, hear, taste, smell, or feel things that are not real (hallucinate).  If you ever feel like you may hurt yourself or others, or have thoughts about taking your own life, get help right away. Go to your nearest emergency department or:  · Call your local emergency services (193  in the [place]  · Call a suicide crisis helpline, such as the National Suicide Prevention Lifeline at 1-659.826.2056. This is open 24 hours a day in the [place]  · Text the Crisis Text Line at 973672 (in the [place]  Summary  · Persistent depressive disorder (PDD) is a mental health condition that causes symptoms of low-level depression for 2 years or longer.  · This condition is usually treated by mental health professionals. You may need more than one type of treatment. Treatment may involve psychotherapy, medicines, and lifestyle changes.  · Get help right away if you have serious thoughts about hurting yourself or others.  This information is not intended to replace advice given to you by your health care provider. Make sure you discuss any questions you have with your health care provider.  Document Revised: 11/28/2020 Document Reviewed: 11/28/2020  Elsevier Patient Education © 2021 [employer]

## 2021-10-24 ENCOUNTER — APPOINTMENT (OUTPATIENT)
Dept: PREADMISSION TESTING | Facility: HOSPITAL | Age: 66
End: 2021-10-24

## 2021-10-24 LAB — SARS-COV-2 RNA PNL SPEC NAA+PROBE: NOT DETECTED

## 2021-10-24 PROCEDURE — U0004 COV-19 TEST NON-CDC HGH THRU: HCPCS | Performed by: INTERNAL MEDICINE

## 2021-10-25 ENCOUNTER — TRANSCRIBE ORDERS (OUTPATIENT)
Dept: GENERAL RADIOLOGY | Facility: HOSPITAL | Age: 66
End: 2021-10-25

## 2021-10-25 DIAGNOSIS — C15.9 MALIGNANT NEOPLASM OF ESOPHAGUS, UNSPECIFIED LOCATION (HCC): Primary | ICD-10-CM

## 2021-10-26 ENCOUNTER — HOSPITAL ENCOUNTER (OUTPATIENT)
Dept: GENERAL RADIOLOGY | Facility: HOSPITAL | Age: 66
Discharge: HOME | End: 2021-10-26

## 2021-10-26 ENCOUNTER — READMISSION MANAGEMENT (OUTPATIENT)
Dept: CALL CENTER | Facility: HOSPITAL | Age: 66
End: 2021-10-26

## 2021-10-26 DIAGNOSIS — C15.9 MALIGNANT NEOPLASM OF ESOPHAGUS, UNSPECIFIED LOCATION (HCC): ICD-10-CM

## 2021-10-26 PROCEDURE — 71045 X-RAY EXAM CHEST 1 VIEW: CPT

## 2021-10-26 NOTE — OUTREACH NOTE
Medical Week 3 Survey      Responses   [facility] patient discharged from? [place]   Does the patient have one of the following disease processes/diagnoses(primary or secondary)? Other   Week 3 attempt successful? Yes   Call start time 0924   Call end time 0926   Discharge diagnosis s/p  EGD showing esophageal mass    Meds reviewed with patient/caregiver? Yes   Is the patient having any side effects they believe may be caused by any medication additions or changes? No   Does the patient have all medications ordered at discharge? Yes   Is the patient taking all medications as directed (includes completed medication regime)? Yes   Does the patient have a primary care provider?  Yes   Does the patient have an appointment with their PCP within 7 days of discharge? Yes   Has the patient kept scheduled appointments due by today? Yes   Comments Has had port placed today.    Has home health visited the patient within 72 hours of discharge? N/A   Psychosocial issues? No   Did the patient receive a copy of their discharge instructions? Yes   What is the patient's perception of their health status since discharge? Improving   Is the patient/caregiver able to teach back signs and symptoms related to disease process for when to call PCP? Yes   Is the patient/caregiver able to teach back signs and symptoms related to disease process for when to call 911? Yes   Is the patient/caregiver able to teach back the hierarchy of who to call/visit for symptoms/problems? PCP, Specialist, Home health nurse, Urgent Care, ED, 911 Yes   If the patient is a current smoker, are they able to teach back resources for cessation? Not a smoker   Week 3 Call Completed? Yes   Wrap up additional comments Very short call, he was just getting home from having his port placed.           Ale Kowalski RN

## 2021-10-27 ENCOUNTER — TELEPHONE (OUTPATIENT)
Dept: FAMILY MEDICINE CLINIC | Facility: CLINIC | Age: 66
End: 2021-10-27

## 2021-10-27 ENCOUNTER — TELEPHONE (OUTPATIENT)
Dept: ONCOLOGY | Facility: HOSPITAL | Age: 66
End: 2021-10-27

## 2021-10-27 DIAGNOSIS — C15.9 ESOPHAGEAL ADENOCARCINOMA (HCC): ICD-10-CM

## 2021-10-27 DIAGNOSIS — G47.00 INSOMNIA, UNSPECIFIED TYPE: ICD-10-CM

## 2021-10-27 DIAGNOSIS — R53.1 WEAKNESS: ICD-10-CM

## 2021-10-27 DIAGNOSIS — B37.0 THRUSH: Primary | ICD-10-CM

## 2021-10-27 RX ORDER — ZOLPIDEM TARTRATE 5 MG/1
5 TABLET ORAL NIGHTLY PRN
Qty: 30 TABLET | Refills: 2 | Status: SHIPPED | OUTPATIENT
Start: 2021-10-27

## 2021-10-27 NOTE — TELEPHONE ENCOUNTER
Patient's family member Holli Allen called about patient needing medications.  She is on his HARDY.  He has been very weak and they need a wheelchair to help him get places.  He has also still been having trouble sleeping.  He can fall asleep, but can't stay asleep.  The Remeron has not really been helping.  Will start Ambien 5mg daily.  Discussed SE.  Agrees to use.  Ck compliant.  They also need a refill on Nystatin mouthwash.  Renewed Nystatin.  Wheelchair order faxed to Patient Aide.

## 2021-10-28 ENCOUNTER — TELEPHONE (OUTPATIENT)
Dept: ONCOLOGY | Facility: CLINIC | Age: 66
End: 2021-10-28

## 2021-10-28 NOTE — TELEPHONE ENCOUNTER
Received [employer] FMLA forms via fax for Angeli Delroy (friend/caregiver) on 10/26/21.  Faxed 10/28/21 to [employer].

## 2021-10-29 DIAGNOSIS — C15.9 ESOPHAGEAL ADENOCARCINOMA (HCC): Primary | ICD-10-CM

## 2021-10-29 RX ORDER — FLUOROURACIL 50 MG/ML
400 INJECTION, SOLUTION INTRAVENOUS ONCE
Status: CANCELLED | OUTPATIENT
Start: 2021-11-09

## 2021-10-29 RX ORDER — DIPHENHYDRAMINE HYDROCHLORIDE 50 MG/ML
50 INJECTION INTRAMUSCULAR; INTRAVENOUS AS NEEDED
Status: CANCELLED | OUTPATIENT
Start: 2021-11-09

## 2021-10-29 RX ORDER — FAMOTIDINE 10 MG/ML
20 INJECTION, SOLUTION INTRAVENOUS AS NEEDED
Status: CANCELLED | OUTPATIENT
Start: 2021-11-09

## 2021-10-29 RX ORDER — DEXTROSE MONOHYDRATE 50 MG/ML
250 INJECTION, SOLUTION INTRAVENOUS ONCE
Status: CANCELLED | OUTPATIENT
Start: 2021-11-09

## 2021-10-29 RX ORDER — PALONOSETRON 0.05 MG/ML
0.25 INJECTION, SOLUTION INTRAVENOUS ONCE
Status: CANCELLED | OUTPATIENT
Start: 2021-11-09

## 2021-11-01 ENCOUNTER — HOSPITAL ENCOUNTER (INPATIENT)
Facility: HOSPITAL | Age: 66
LOS: 2 days | Discharge: HOME | End: 2021-11-03
Attending: INTERNAL MEDICINE | Admitting: INTERNAL MEDICINE

## 2021-11-01 ENCOUNTER — NURSE NAVIGATOR (OUTPATIENT)
Dept: ONCOLOGY | Facility: HOSPITAL | Age: 66
End: 2021-11-01

## 2021-11-01 ENCOUNTER — TELEPHONE (OUTPATIENT)
Dept: ONCOLOGY | Facility: OTHER | Age: 66
End: 2021-11-01

## 2021-11-01 ENCOUNTER — INFUSION (OUTPATIENT)
Dept: ONCOLOGY | Facility: HOSPITAL | Age: 66
End: 2021-11-01

## 2021-11-01 VITALS
BODY MASS INDEX: 25.51 KG/M2 | TEMPERATURE: 97.3 F | DIASTOLIC BLOOD PRESSURE: 63 MMHG | RESPIRATION RATE: 16 BRPM | HEART RATE: 96 BPM | WEIGHT: 177.8 LBS | OXYGEN SATURATION: 98 % | SYSTOLIC BLOOD PRESSURE: 128 MMHG

## 2021-11-01 DIAGNOSIS — C15.9 ESOPHAGEAL ADENOCARCINOMA (HCC): Primary | ICD-10-CM

## 2021-11-01 PROBLEM — N17.9 ACUTE RENAL FAILURE (ARF) (HCC): Status: ACTIVE | Noted: 2021-11-01

## 2021-11-01 LAB
ALBUMIN SERPL-MCNC: 3 G/DL (ref 3.5–5.2)
ALBUMIN/GLOB SERPL: 0.8 G/DL
ALP SERPL-CCNC: 592 U/L (ref 39–117)
ALT SERPL W P-5'-P-CCNC: 95 U/L (ref 1–41)
ANION GAP SERPL CALCULATED.3IONS-SCNC: 16.7 MMOL/L (ref 5–15)
AST SERPL-CCNC: 132 U/L (ref 1–40)
BACTERIA UR QL AUTO: ABNORMAL /HPF
BILIRUB SERPL-MCNC: 1.4 MG/DL (ref 0–1.2)
BILIRUB UR QL STRIP: NEGATIVE
BUN SERPL-MCNC: 59 MG/DL (ref 8–23)
BUN/CREAT SERPL: 26.7 (ref 7–25)
CALCIUM SPEC-SCNC: 9.8 MG/DL (ref 8.6–10.5)
CHLORIDE SERPL-SCNC: 98 MMOL/L (ref 98–107)
CLARITY UR: ABNORMAL
CO2 SERPL-SCNC: 18.3 MMOL/L (ref 22–29)
COLOR UR: YELLOW
CREAT SERPL-MCNC: 2.21 MG/DL (ref 0.76–1.27)
CREAT UR-MCNC: 96.5 MG/DL
DEPRECATED RDW RBC AUTO: 46.5 FL (ref 37–54)
ERYTHROCYTE [DISTWIDTH] IN BLOOD BY AUTOMATED COUNT: 14 % (ref 12.3–15.4)
GFR SERPL CREATININE-BSD FRML MDRD: 30 ML/MIN/1.73
GLOBULIN UR ELPH-MCNC: 3.7 GM/DL
GLUCOSE BLDC GLUCOMTR-MCNC: 125 MG/DL (ref 70–130)
GLUCOSE SERPL-MCNC: 145 MG/DL (ref 65–99)
GLUCOSE UR STRIP-MCNC: NEGATIVE MG/DL
GRAN CASTS URNS QL MICRO: ABNORMAL /LPF
HCT VFR BLD AUTO: 31.8 % (ref 37.5–51)
HGB BLD-MCNC: 10.5 G/DL (ref 13–17.7)
HGB UR QL STRIP.AUTO: ABNORMAL
HYALINE CASTS UR QL AUTO: ABNORMAL /LPF
KETONES UR QL STRIP: NEGATIVE
LEUKOCYTE ESTERASE UR QL STRIP.AUTO: NEGATIVE
LYMPHOCYTES # BLD MANUAL: 3.96 10*3/MM3 (ref 0.7–3.1)
LYMPHOCYTES NFR BLD MANUAL: 26 % (ref 19.6–45.3)
LYMPHOCYTES NFR BLD MANUAL: 3 % (ref 5–12)
MCH RBC QN AUTO: 30.2 PG (ref 26.6–33)
MCHC RBC AUTO-ENTMCNC: 33 G/DL (ref 31.5–35.7)
MCV RBC AUTO: 91.4 FL (ref 79–97)
MONOCYTES # BLD AUTO: 0.44 10*3/MM3 (ref 0.1–0.9)
NEUTROPHILS # BLD AUTO: 10.26 10*3/MM3 (ref 1.7–7)
NEUTROPHILS NFR BLD MANUAL: 67 % (ref 42.7–76)
NEUTS BAND NFR BLD MANUAL: 3 % (ref 0–5)
NITRITE UR QL STRIP: NEGATIVE
PH UR STRIP.AUTO: <=5 [PH] (ref 5–8)
PLATELET # BLD AUTO: 265 10*3/MM3 (ref 140–450)
PMV BLD AUTO: 9.9 FL (ref 6–12)
POTASSIUM SERPL-SCNC: 4.1 MMOL/L (ref 3.5–5.2)
PROT SERPL-MCNC: 6.7 G/DL (ref 6–8.5)
PROT UR QL STRIP: ABNORMAL
RBC # BLD AUTO: 3.48 10*6/MM3 (ref 4.14–5.8)
RBC # UR: ABNORMAL /HPF
RBC MORPH BLD: NORMAL
REF LAB TEST METHOD: ABNORMAL
SARS-COV-2 RDRP RESP QL NAA+PROBE: NORMAL
SCAN SLIDE: NORMAL
SMALL PLATELETS BLD QL SMEAR: ADEQUATE
SODIUM SERPL-SCNC: 133 MMOL/L (ref 136–145)
SP GR UR STRIP: 1.02 (ref 1–1.03)
SQUAMOUS #/AREA URNS HPF: ABNORMAL /HPF
UROBILINOGEN UR QL STRIP: ABNORMAL
VARIANT LYMPHS NFR BLD MANUAL: 1 % (ref 0–5)
WBC # BLD AUTO: 14.66 10*3/MM3 (ref 3.4–10.8)
WBC MORPH BLD: NORMAL
WBC UR QL AUTO: ABNORMAL /HPF

## 2021-11-01 PROCEDURE — 87635 SARS-COV-2 COVID-19 AMP PRB: CPT | Performed by: INTERNAL MEDICINE

## 2021-11-01 PROCEDURE — 85025 COMPLETE CBC W/AUTO DIFF WBC: CPT

## 2021-11-01 PROCEDURE — 99223 1ST HOSP IP/OBS HIGH 75: CPT | Performed by: INTERNAL MEDICINE

## 2021-11-01 PROCEDURE — 25010000002 DEXAMETHASONE PER 1 MG: Performed by: NURSE PRACTITIONER

## 2021-11-01 PROCEDURE — 82962 GLUCOSE BLOOD TEST: CPT

## 2021-11-01 PROCEDURE — 82570 ASSAY OF URINE CREATININE: CPT | Performed by: INTERNAL MEDICINE

## 2021-11-01 PROCEDURE — 96360 HYDRATION IV INFUSION INIT: CPT

## 2021-11-01 PROCEDURE — 85007 BL SMEAR W/DIFF WBC COUNT: CPT

## 2021-11-01 PROCEDURE — 81001 URINALYSIS AUTO W/SCOPE: CPT | Performed by: INTERNAL MEDICINE

## 2021-11-01 PROCEDURE — 80053 COMPREHEN METABOLIC PANEL: CPT

## 2021-11-01 PROCEDURE — 25010000002 HEPARIN (PORCINE) PER 1000 UNITS: Performed by: INTERNAL MEDICINE

## 2021-11-01 PROCEDURE — 84300 ASSAY OF URINE SODIUM: CPT | Performed by: INTERNAL MEDICINE

## 2021-11-01 PROCEDURE — 36415 COLL VENOUS BLD VENIPUNCTURE: CPT

## 2021-11-01 RX ORDER — ZOLPIDEM TARTRATE 5 MG/1
5 TABLET ORAL NIGHTLY PRN
Status: DISCONTINUED | OUTPATIENT
Start: 2021-11-01 | End: 2021-11-02

## 2021-11-01 RX ORDER — SODIUM CHLORIDE 9 MG/ML
1000 INJECTION, SOLUTION INTRAVENOUS ONCE
Status: CANCELLED
Start: 2021-11-01 | End: 2021-11-01

## 2021-11-01 RX ORDER — HEPARIN SODIUM 5000 [USP'U]/ML
5000 INJECTION, SOLUTION INTRAVENOUS; SUBCUTANEOUS EVERY 8 HOURS SCHEDULED
Status: DISCONTINUED | OUTPATIENT
Start: 2021-11-01 | End: 2021-11-03 | Stop reason: HOSPADM

## 2021-11-01 RX ORDER — ASPIRIN 81 MG/1
81 TABLET ORAL DAILY
Status: DISCONTINUED | OUTPATIENT
Start: 2021-11-01 | End: 2021-11-03 | Stop reason: HOSPADM

## 2021-11-01 RX ORDER — DEXAMETHASONE SODIUM PHOSPHATE 4 MG/ML
4 INJECTION, SOLUTION INTRA-ARTICULAR; INTRALESIONAL; INTRAMUSCULAR; INTRAVENOUS; SOFT TISSUE ONCE
Status: COMPLETED | OUTPATIENT
Start: 2021-11-01 | End: 2021-11-01

## 2021-11-01 RX ORDER — MIRTAZAPINE 15 MG/1
30 TABLET, FILM COATED ORAL NIGHTLY
Status: DISCONTINUED | OUTPATIENT
Start: 2021-11-01 | End: 2021-11-03 | Stop reason: HOSPADM

## 2021-11-01 RX ORDER — ONDANSETRON 2 MG/ML
4 INJECTION INTRAMUSCULAR; INTRAVENOUS EVERY 6 HOURS PRN
Status: DISCONTINUED | OUTPATIENT
Start: 2021-11-01 | End: 2021-11-03 | Stop reason: HOSPADM

## 2021-11-01 RX ORDER — SODIUM CHLORIDE 0.9 % (FLUSH) 0.9 %
10 SYRINGE (ML) INJECTION AS NEEDED
Status: DISCONTINUED | OUTPATIENT
Start: 2021-11-01 | End: 2021-11-03 | Stop reason: HOSPADM

## 2021-11-01 RX ORDER — DEXAMETHASONE 4 MG/1
4 TABLET ORAL
Status: DISCONTINUED | OUTPATIENT
Start: 2021-11-02 | End: 2021-11-03 | Stop reason: HOSPADM

## 2021-11-01 RX ORDER — SODIUM CHLORIDE 9 MG/ML
1000 INJECTION, SOLUTION INTRAVENOUS ONCE
Status: COMPLETED | OUTPATIENT
Start: 2021-11-01 | End: 2021-11-01

## 2021-11-01 RX ORDER — DEXTROSE MONOHYDRATE 25 G/50ML
25 INJECTION, SOLUTION INTRAVENOUS
Status: DISCONTINUED | OUTPATIENT
Start: 2021-11-01 | End: 2021-11-03 | Stop reason: HOSPADM

## 2021-11-01 RX ORDER — SODIUM CHLORIDE 0.9 % (FLUSH) 0.9 %
10 SYRINGE (ML) INJECTION EVERY 12 HOURS SCHEDULED
Status: DISCONTINUED | OUTPATIENT
Start: 2021-11-01 | End: 2021-11-03 | Stop reason: HOSPADM

## 2021-11-01 RX ORDER — SODIUM CHLORIDE 0.9 % (FLUSH) 0.9 %
10 SYRINGE (ML) INJECTION AS NEEDED
Status: CANCELLED | OUTPATIENT
Start: 2021-11-01

## 2021-11-01 RX ORDER — NICOTINE POLACRILEX 4 MG
15 LOZENGE BUCCAL
Status: DISCONTINUED | OUTPATIENT
Start: 2021-11-01 | End: 2021-11-03 | Stop reason: HOSPADM

## 2021-11-01 RX ORDER — SODIUM CHLORIDE 9 MG/ML
75 INJECTION, SOLUTION INTRAVENOUS CONTINUOUS
Status: DISCONTINUED | OUTPATIENT
Start: 2021-11-01 | End: 2021-11-03 | Stop reason: HOSPADM

## 2021-11-01 RX ORDER — METOCLOPRAMIDE 10 MG/1
5 TABLET ORAL
Status: DISCONTINUED | OUTPATIENT
Start: 2021-11-01 | End: 2021-11-03 | Stop reason: HOSPADM

## 2021-11-01 RX ORDER — PANTOPRAZOLE SODIUM 40 MG/1
40 TABLET, DELAYED RELEASE ORAL
Status: DISCONTINUED | OUTPATIENT
Start: 2021-11-02 | End: 2021-11-03 | Stop reason: HOSPADM

## 2021-11-01 RX ORDER — HEPARIN SODIUM (PORCINE) LOCK FLUSH IV SOLN 100 UNIT/ML 100 UNIT/ML
500 SOLUTION INTRAVENOUS AS NEEDED
Status: CANCELLED | OUTPATIENT
Start: 2021-11-01

## 2021-11-01 RX ADMIN — DEXAMETHASONE SODIUM PHOSPHATE 4 MG: 4 INJECTION, SOLUTION INTRAMUSCULAR; INTRAVENOUS at 18:05

## 2021-11-01 RX ADMIN — METOCLOPRAMIDE 5 MG: 10 TABLET ORAL at 18:05

## 2021-11-01 RX ADMIN — MIRTAZAPINE 30 MG: 15 TABLET, FILM COATED ORAL at 21:58

## 2021-11-01 RX ADMIN — SODIUM CHLORIDE, PRESERVATIVE FREE 10 ML: 5 INJECTION INTRAVENOUS at 14:34

## 2021-11-01 RX ADMIN — HEPARIN SODIUM 5000 UNITS: 5000 INJECTION, SOLUTION INTRAVENOUS; SUBCUTANEOUS at 21:58

## 2021-11-01 RX ADMIN — ASPIRIN 81 MG: 81 TABLET, COATED ORAL at 14:39

## 2021-11-01 RX ADMIN — SODIUM CHLORIDE 1000 ML: 9 INJECTION, SOLUTION INTRAVENOUS at 10:52

## 2021-11-01 RX ADMIN — HEPARIN SODIUM 5000 UNITS: 5000 INJECTION, SOLUTION INTRAVENOUS; SUBCUTANEOUS at 14:40

## 2021-11-01 RX ADMIN — SODIUM CHLORIDE, PRESERVATIVE FREE 10 ML: 5 INJECTION INTRAVENOUS at 21:58

## 2021-11-01 RX ADMIN — ZOLPIDEM TARTRATE 5 MG: 5 TABLET ORAL at 21:58

## 2021-11-01 RX ADMIN — SODIUM CHLORIDE 75 ML/HR: 9 INJECTION, SOLUTION INTRAVENOUS at 14:33

## 2021-11-01 RX ADMIN — METOCLOPRAMIDE 5 MG: 10 TABLET ORAL at 21:58

## 2021-11-01 NOTE — CONSULTS
HEMATOLOGY/ONCOLOGY INPATIENT CONSULT    REASON FOR CONSULT: Metastatic esophageal adenocarcinoma    Subjective   HISTORY OF PRESENT ILLNESS;   Mr. Potter is a 66-year-old gentleman with past medical history of metastatic esophageal adenocarcinoma, hyperlipidemia, stroke, hypertension, type 2 diabetes who presents to [facility] with failure to thrive.  Patient was scheduled to begin his first cycle of chemotherapy today however he presented to the infusion center and was found to have a significant LOTTIE as well as elevated LFTs.  Over the past 3 weeks, he has lost almost 20 pounds.  He states that food does not taste good but denies any significant nausea or vomiting.  Does note occasional vomiting which he states is mostly water and not undigested food.  He has been having worsening weakness and fatigue as a result of all this.  He denies any diarrhea or throat pain at this time.  Does note some left shoulder discomfort but is otherwise compliant with his home medications      Past Medical History:   Diagnosis Date   • BPH (benign prostatic hyperplasia)    • Diabetes mellitus (HCC)    • Hyperlipidemia    • Hypertension    • Stroke (HCC)      Past Surgical History:   Procedure Laterality Date   • CARDIAC CATHETERIZATION N/A 9/9/2020    Procedure: Left Heart Cath 16516;  Surgeon: Bang Walker MD;  Location:  [facility] CATH INVASIVE LOCATION;  Service: Cardiology;  Laterality: N/A;   • COLONOSCOPY     • ENDOSCOPY N/A 10/6/2021    Procedure: ESOPHAGOGASTRODUODENOSCOPY;  Surgeon: Obed Galvez MD;  Location:  [facility] ENDOSCOPY;  Service: Gastroenterology;  Laterality: N/A;   • HAND SURGERY     • HIP BIPOLAR REPLACEMENT     • INTERVENTIONAL RADIOLOGY PROCEDURE N/A 6/29/2021    Procedure: IR MECHANICAL THROMBECTOMY - PRIMARY;  Surgeon: Dru Gomes MD;  Location:  [facility] CATH INVASIVE LOCATION;  Service: Interventional Radiology;  Laterality: N/A;       Current Facility-Administered Medications on  File Prior to Encounter   Medication Dose Route Frequency Provider Last Rate Last Admin   • [COMPLETED] sodium chloride 0.9 % infusion 1,000 mL  1,000 mL Intravenous Once Sudhakar Ernandez MD   Stopped at 11/01/21 1137     Current Outpatient Medications on File Prior to Encounter   Medication Sig Dispense Refill   • aspirin 81 MG EC tablet Take 1 tablet by mouth Daily. 90 tablet 0   • Blood Glucose Monitoring Suppl (Accu-Chek Guide Me) w/Device kit      • glucose blood test strip Use to monitor glucose daily as instructed 100 each 3   • glucose monitor monitoring kit Use to monitor glucose daily 1 each 0   • Lancets (onetouch ultrasoft) lancets Use to monitor glucose once daily 100 each 3   • lidocaine-prilocaine (EMLA) 2.5-2.5 % cream Apply 1 application topically to the appropriate area as directed As Needed (45-60 minutes prior to port access.  Cover with saran/plastic wrap.). 30 g 3   • metoclopramide (REGLAN) 5 MG tablet Take 1 tablet by mouth 4 (Four) Times a Day Before Meals & at Bedtime. 160 tablet 0   • mirtazapine (REMERON) 30 MG tablet Take 30 mg by mouth Every Night.     • nystatin (MYCOSTATIN) 100,000 unit/mL suspension Swish and swallow 5 mL 4 (Four) Times a Day. 280 mL 0   • ondansetron ODT (ZOFRAN-ODT) 8 MG disintegrating tablet Place 1 tablet on the tongue Every 8 (Eight) Hours As Needed for Nausea or Vomiting for up to 60 doses. 60 tablet 3   • pantoprazole (PROTONIX) 40 MG EC tablet Take 1 tablet by mouth Every Morning. 30 tablet 0   • rosuvastatin (Crestor) 40 MG tablet Take 1 tablet by mouth Daily. 30 tablet 0   • zolpidem (AMBIEN) 5 MG tablet Take 1 tablet by mouth At Night As Needed for Sleep. 30 tablet 2       No Known Allergies    Social History     Socioeconomic History   • Marital status: Single   Tobacco Use   • Smoking status: Former Smoker     Types: Cigarettes   • Smokeless tobacco: Never Used   • Tobacco comment: quit 30 years ago   Vaping Use   • Vaping Use: Never used   Substance  "and Sexual Activity   • Alcohol use: Yes     Comment: once a month   • Drug use: Never   • Sexual activity: Defer       Family History   Problem Relation Age of Onset   • Diabetes Mother          REVIEW OF SYSTEMS:  A 12 point review of systems was performed and is negative except as noted above.    Objective   PHYSICAL EXAM:    /63 (BP Location: Left arm, Patient Position: Lying)   Pulse 90   Temp 98.4 °F (36.9 °C) (Oral)   Resp 18   Ht 177.8 cm (70\")   Wt 82.6 kg (182 lb)   SpO2 97%   BMI 26.11 kg/m²     General: Frail appearing male in no acute distress  HEENT: sclerae anicteric, oropharynx clear  Lymphatics: no cervical, supraclavicular, inguinal, or axillary adenopathy  Neck: Supple. No thyromegaly.  Cardiovascular: regular rate and rhythm, no murmurs  Lungs: clear to auscultation bilaterally. No respiratory distress  Abdomen: soft, nontender, nondistended.  No palpable organomegaly  Extremities: no lower extremity edema, cyanosis, or clubbing  Skin: no rashes, lesions, bruising, or petechiae  Neuro: Alert and oriented x3. Moves all extremities.    Results:    Results from last 7 days   Lab Units 11/01/21  0933   WBC 10*3/mm3 14.66*   HEMOGLOBIN g/dL 10.5*   PLATELETS 10*3/mm3 265     Results from last 7 days   Lab Units 11/01/21  0933   SODIUM mmol/L 133*   POTASSIUM mmol/L 4.1   CO2 mmol/L 18.3*   BUN mg/dL 59*   CREATININE mg/dL 2.21*   GLUCOSE mg/dL 145*     Results from last 7 days   Lab Units 11/01/21  0933   AST (SGOT) U/L 132*   ALT (SGPT) U/L 95*   BILIRUBIN mg/dL 1.4*   ALK PHOS U/L 592*         XR Chest 1 View    Result Date: 10/26/2021  Interval placement right chest wall Port-A-Cath terminating within the mid to distal SVC. No postprocedure pneumothorax.  D: 10/26/2021 E: 10/26/2021  This report was finalized on 10/26/2021 4:36 PM by Dr. Jefe Martin.        Assessment    ASSESSMENT & PLAN:  Metastatic esophageal adenocarcinoma  -Recently diagnosed with known metastatic disease to the " liver  -PD-L1 positive, HER-2/lisa negative  -Scheduled to start FOLFOX today however given his failure to thrive and LOTTIE he is not appropriate for treatment at this time  -Had goals of care discussion with patient today and he would like to try and pursue aggressive systemic therapy if possible.  Did discuss that should he not have any clinical improvement, he would not be a candidate for systemic therapy in the future  -Advised patient to try to increase his oral intake as well as work with PT/OT while inpatient to try and improve his clinical status  -Palliative consulted for further goals of care discussion and symptom management    Failure to thrive  -Secondary to his malignancy  -Management as above    LOTTIE on CKD  -Creatinine up to 2.1 from 1.3 today  -Likely secondary to him not eating and drinking as well    Elevated LFTs and hyperbilirubinemia  -Likely secondary to his known metastatic disease    Leukocytosis   -Unclear etiology  -Patient not currently on steroids as he did not receive any in infusion today  -Consider infectious work-up should patient become febrile    Thank you for the consult.  Please do not hesitate to contact with any questions or concerns.  We will continue to follow inpatient    Sudhakar Ernandez MD  Hematology and Oncology    11/1/2021  17:02 EDT

## 2021-11-01 NOTE — PROGRESS NOTES
"Met with patient today to introduce navigator role. Gave patient quilt and navigator card. Pt. Stated, \" I am pretty weak\". Pt. Denies any needs at this time.  "

## 2021-11-01 NOTE — CONSULTS
"Meghan Watt APRN  Consulting physician: Cathleen Samaniego  Reason for referral: symptom management  No chief complaint on file.    HPI:   66 y.o. male with recent diagnosis of esophageal adenocarcinoma, previous CVA, obesity, HTN, CAD, T2DM who presents as a direct admission at the prompting of his oncologist, Dr Ernandez, due to abnormal lab work on 11/1/2021. Patient was due to start chemotherapy for his esophageal cancer today, however on labs he was noted to have elevated bili 1.4, ALT 95, , WBC 14k, Cr 2.2. Patient was advised for direct admission for further treatment/workup.   Symptoms:   Patient reports primary symptom is fatigue, anorexia. + taste changes after stroke.  + weight loss 15# over last few weeks.  Left shoulder pain - \"achy\" intermittent  5/10 currently.  Prn acetaminophen has been effective  No nausea but episodes of intermittent emesis  No dyspnea or anxiety  Advance care planning discussed:   Code Status:   Current Code Status     Date Active Code Status Order ID Comments User Context       11/1/2021 1417 No CPR [id number]  Cathleen Samaniego MD Inpatient     Advance Care Planning Activity      Questions for Current Code Status     Question Answer    Code Status No CPR    Medical Interventions (Level of Support Prior to Arrest) Limited    Limited Support to NOT Include Cardioversion/Defibrillation     Intubation     Vasopressors        Advance Directive: reviewed on medical record  Surrogate decision maker: Angeli Potts  Past Medical History:   Diagnosis Date   • BPH (benign prostatic hyperplasia)    • Diabetes mellitus (HCC)    • Hyperlipidemia    • Hypertension    • Stroke (HCC)      Past Surgical History:   Procedure Laterality Date   • CARDIAC CATHETERIZATION N/A 9/9/2020    Procedure: Left Heart Cath 32299;  Surgeon: Bang Walker MD;  Location: [facility] CATH INVASIVE LOC[facility];  Service: Cardiology;  Laterality: N/A;   • COLONOSCOPY     • ENDOSCOPY N/A 10/6/2021    Procedure: " ESOPHAGOGASTRODUODENOSCOPY;  Surgeon: Obed Galvez MD;  Location: [facility] ENDOSCOPY;  Service: Gastroenterology;  Laterality: N/A;   • HAND SURGERY     • HIP BIPOLAR REPLACEMENT     • INTERVENTIONAL RADIOLOGY PROCEDURE N/A 6/29/2021    Procedure: IR MECHANICAL THROMBECTOMY - PRIMARY;  Surgeon: Dru Gomes MD;  Location:  [facility] CATH INVASIVE LOCATION;  Service: Interventional Radiology;  Laterality: N/A;       Reviewed current scheduled and prn medications for route, type, dose and frequency.    Current Facility-Administered Medications   Medication Dose Route Frequency Provider Last Rate Last Admin   • aspirin EC tablet 81 mg  81 mg Oral Daily Cathleen Samaniego MD   81 mg at 11/01/21 1439   • dextrose (D50W) (25 g/50 mL) IV injection 25 g  25 g Intravenous Q15 Min PRN Cathleen Samaniego MD       • dextrose (GLUTOSE) oral gel 15 g  15 g Oral Q15 Min PRN Cathleen Samaniego MD       • glucagon (human recombinant) (GLUCAGEN DIAGNOSTIC) injection 1 mg  1 mg Subcutaneous Q15 Min PRN Cathleen Samaniego MD       • heparin (porcine) 5000 UNIT/ML injection 5,000 Units  5,000 Units Subcutaneous Q8H Cathleen Samaniego MD   5,000 Units at 11/01/21 1440   • insulin lispro (humaLOG) injection 0-7 Units  0-7 Units Subcutaneous TID AC Cathleen Samaniego MD       • metoclopramide (REGLAN) tablet 5 mg  5 mg Oral 4x Daily AC & at Bedtime Cathleen Samaniego MD       • mirtazapine (REMERON) tablet 30 mg  30 mg Oral Nightly Cathleen Samaniego MD       • [START ON 11/2/2021] pantoprazole (PROTONIX) EC tablet 40 mg  40 mg Oral Q AM Cathleen Samaniego MD       • sodium chloride 0.9 % flush 10 mL  10 mL Intravenous Q12H Cathleen Samaniego MD   10 mL at 11/01/21 1434   • sodium chloride 0.9 % flush 10 mL  10 mL Intravenous PRN Cathleen Samaniego MD       • sodium chloride 0.9 % infusion  75 mL/hr Intravenous Continuous Cathleen Samaniego MD 75 mL/hr at 11/01/21 1433 75 mL/hr at 11/01/21 1433   • zolpidem (AMBIEN) tablet 5 mg  5 mg Oral Nightly PRN Aden  "Cathleen GRECO MD         sodium chloride, 75 mL/hr, Last Rate: 75 mL/hr (11/01/21 6703)      dextrose  •  dextrose  •  glucagon (human recombinant)  •  sodium chloride  •  zolpidem  No Known Allergies  Family History   Problem Relation Age of Onset   • Diabetes Mother      Social History     Socioeconomic History   • Marital status: Single   Tobacco Use   • Smoking status: Former Smoker     Types: Cigarettes   • Smokeless tobacco: Never Used   • Tobacco comment: quit 30 years ago   Vaping Use   • Vaping Use: Never used   Substance and Sexual Activity   • Alcohol use: Yes     Comment: once a month   • Drug use: Never   • Sexual activity: Defer       Review of Systems - +/- per HPI and symptom review.      PPS: 50%  /63 (BP Location: Left arm, Patient Position: Lying)   Pulse 90   Temp 98.4 °F (36.9 °C) (Oral)   Resp 18   Ht 177.8 cm (70\")   Wt 82.6 kg (182 lb)   SpO2 97%   BMI 26.11 kg/m²   82.6 kg (182 lb) Body mass index is 26.11 kg/m².  Intake & Output (last day)       10/31 0701  11/01 0700 11/01 0701 11/02 0700          Urine Unmeasured Occurrence  2 x          Physical Exam:  General Appearance: No acute distress, ill and tired appearing, unable to keep eyes open  Head: Normocephalic without obvious abnormality, atraumatic  Eyes: Conjunctivae and sclerae normal, no icterus, EVETTE  Throat: No oral lesions, no thrush, oral mucosa moist  Neck: No adenopathy, supple trachea, midline  Back: No kyphosis present, no erythema, no tenderness midline  Lungs: Clear to auscultation, respirations regular, even and non-labored, diminished in bases  Heart: Regular rhythm and normal rate, normal S1  Abdomen: Normal bowel sounds, soft, non-distended, non-tender  Extremities: Moves all extremities, no redness, no cyanosis, no edema  Pulses: radial pulses palpable and equal bilaterally  Skin: Warm and dry  Neurological: awake, interactive, appropriate conversation, no myoclonus, ambulates independently    Reviewed labs " and diagnostic results.  Lab Results   Component Value Date    HGBA1C 6.10 (H) 10/01/2021     Results from last 7 days   Lab Units 11/01/21  0933   WBC 10*3/mm3 14.66*   HEMOGLOBIN g/dL 10.5*   HEMATOCRIT % 31.8*   PLATELETS 10*3/mm3 265     Results from last 7 days   Lab Units 11/01/21  0933   SODIUM mmol/L 133*   POTASSIUM mmol/L 4.1   CHLORIDE mmol/L 98   CO2 mmol/L 18.3*   BUN mg/dL 59*   CREATININE mg/dL 2.21*   CALCIUM mg/dL 9.8   BILIRUBIN mg/dL 1.4*   ALK PHOS U/L 592*   ALT (SGPT) U/L 95*   AST (SGOT) U/L 132*   GLUCOSE mg/dL 145*     Results from last 7 days   Lab Units 11/01/21  0933   SODIUM mmol/L 133*   POTASSIUM mmol/L 4.1   CHLORIDE mmol/L 98   CO2 mmol/L 18.3*   BUN mg/dL 59*   CREATININE mg/dL 2.21*   GLUCOSE mg/dL 145*   CALCIUM mg/dL 9.8     Imaging Results (Last 72 Hours)     ** No results found for the last 72 hours. **        Impression: 66 y.o. male with esophageal adenocarcinoma stage IV  Plan:   Tiredness, anorexia/taste changes - continue with remeron, add daily dexamethasone    Left shoulder pain to base of neck - diclofenac ointment scheduled, prn acetaminophen    Emesis, reflux - continue with metoclopramide, continue to monitor    Goals of care - patient was scheduled to start chemotherapy today. Some discussion about choices with treatment or not, especially with stage IV disease and functional decline with diminished po intake.  Dr Ernandez initiated and involved in discussion.   Palliative team provide support to pt/family       Trinidad Estrella, APRN  [phone]  11/01/21  15:50 EDT      Time: 60 minutes spent reviewing medical and medication records, assessing and examining patient, discussing with patient, family and consulting provider, answering questions, formulating a plan and documentation of care. > 50% time spent face to face

## 2021-11-01 NOTE — H&P
"    [facility] Medicine Services  HISTORY AND PHYSICAL    Patient Name: Andry Potter  : 1955  MRN: [id number]  Primary Care Physician: Meghan Watt APRN  Date of admission: 2021      Subjective   Subjective     Chief Complaint:  Abnormal labs    HPI:  Andry Potter is a 66 y.o. male with recent diagnosis of esophageal adenocarcinoma, previous CVA, obesity, HTN, CAD, T2DM who presents as a direct admission at the prompting of his oncologist, Dr Ernandez, due to abnl lab work. Patient was due to start chemotherapy for his esophageal cancer today, however on labs he was noted to have new abnls noting elevated bili 1.4, ALT 95, , WBC 14k, Cr 2.2. Patient was advised for direct admission for further treatment/workup.     Patient presents with his sister today. Reports ongoing fatigue at home, not sleeping well but otherwise no specific complaints. Had port placed last  in [place], this is doing well with no patient complaints. Denies f/c abdominal pain or any other infectious symptoms. Does have some trouble with urinating reports \"slightly less\". Has been eating ok- noting today he had protein shake and bar already. No trouble swallowing.         COVID Details:    Symptoms:    [x] NONE [] Fever []  Cough [] Shortness of breath [] Change in taste/smell      The patient has a COVID HM Topic on their chart, and they are fully vaccinated.      Review of Systems   Gen- No fevers, chills  CV- No chest pain, palpitations  Resp- No cough, dyspnea  GI- No N/V/D, abd pain    +slightly less urine  All other systems reviewed and are negative.     Personal History     Past Medical History:   Diagnosis Date   • BPH (benign prostatic hyperplasia)    • Diabetes mellitus (HCC)    • Hyperlipidemia    • Hypertension    • Stroke (HCC)        Past Surgical History:   Procedure Laterality Date   • CARDIAC CATHETERIZATION N/A 2020    Procedure: Left Heart Cath 02196;  Surgeon: Aaron" Bang BOND MD;  Location:  [facility] CATH INVASIVE [facility];  Service: Cardiology;  Laterality: N/A;   • COLONOSCOPY     • ENDOSCOPY N/A 10/6/2021    Procedure: ESOPHAGOGASTRODUODENOSCOPY;  Surgeon: Obed Galvez MD;  Location:  [facility] ENDOSCOPY;  Service: Gastroenterology;  Laterality: N/A;   • HAND SURGERY     • HIP BIPOLAR REPLACEMENT     • INTERVENTIONAL RADIOLOGY PROCEDURE N/A 6/29/2021    Procedure: IR MECHANICAL THROMBECTOMY - PRIMARY;  Surgeon: Dru Gomes MD;  Location:  [facility] CATH [facility]VASIVE [facility];  Service: Interventional Radiology;  Laterality: N/A;       Family History:  family history includes Diabetes in his mother. Otherwise pertinent FHx was reviewed and unremarkable.     Social History:  reports that he has quit smoking. His smoking use included cigarettes. He has never used smokeless tobacco. He reports current alcohol use. He reports that he does not use drugs.  Social History     Social History Narrative   • Not on file       Medications:  Available home medication information reviewed.  Medications Prior to Admission   Medication Sig Dispense Refill Last Dose   • aspirin 81 MG EC tablet Take 1 tablet by mouth Daily. 90 tablet 0    • Blood Glucose Monitoring Suppl (Accu-Chek Guide Me) w/Device kit       • glucose blood test strip Use to monitor glucose daily as instructed 100 each 3    • glucose monitor monitoring kit Use to monitor glucose daily 1 each 0    • Lancets (onetouch ultrasoft) lancets Use to monitor glucose once daily 100 each 3    • lidocaine-prilocaine (EMLA) 2.5-2.5 % cream Apply 1 application topically to the appropriate area as directed As Needed (45-60 minutes prior to port access.  Cover with saran/plastic wrap.). 30 g 3    • metoclopramide (REGLAN) 5 MG tablet Take 1 tablet by mouth 4 (Four) Times a Day Before Meals & at Bedtime. 160 tablet 0    • mirtazapine (REMERON) 30 MG tablet Take 30 mg by mouth Every Night.      • nystatin (MYCOSTATIN) 100,000 unit/mL suspension  Swish and swallow 5 mL 4 (Four) Times a Day. 280 mL 0    • ondansetron ODT (ZOFRAN-ODT) 8 MG disintegrating tablet Place 1 tablet on the tongue Every 8 (Eight) Hours As Needed for Nausea or Vomiting for up to 60 doses. 60 tablet 3    • pantoprazole (PROTONIX) 40 MG EC tablet Take 1 tablet by mouth Every Morning. 30 tablet 0    • rosuvastatin (Crestor) 40 MG tablet Take 1 tablet by mouth Daily. 30 tablet 0    • zolpidem (AMBIEN) 5 MG tablet Take 1 tablet by mouth At Night As Needed for Sleep. 30 tablet 2        No Known Allergies    Objective   Objective     Vital Signs:   Temp:  [97.3 °F (36.3 °C)-98.1 °F (36.7 °C)] 98.1 °F (36.7 °C)  Heart Rate:  [84-96] 84  Resp:  [16-18] 18  BP: (115-128)/(63-71) 115/71       Physical Exam   GEN- resting in bed, drowsy  HEENT- atraumatic, normocephalic, eomi  NECK- supple, trachea midline, no masses, port present on left chest wall   RESP: ctab, normal effort, on RA  CV: no murmurs, s1/s2, rrr  MSK: no edema noted, spontaneous movement of all extremities  NEURO: alert, oriented, no focal deficits  SKIN: no rashes  PSYCH: appropriate mood and affect       Result Review:  I have personally reviewed the results from the time of this admission to 11/1/2021 14:17 EDT and agree with these findings:  [x]  Laboratory  [x]  Microbiology  [x]  Radiology  []  EKG/Telemetry   []  Cardiology/Vascular   [x]  Pathology  []  Old records  []  Other:  Most notable findings include:     See labs      LAB RESULTS:      Lab 11/01/21  0933   WBC 14.66*   HEMOGLOBIN 10.5*   HEMATOCRIT 31.8*   PLATELETS 265   NEUTROS ABS 10.26*   MCV 91.4         Lab 11/01/21  0933   SODIUM 133*   POTASSIUM 4.1   CHLORIDE 98   CO2 18.3*   ANION GAP 16.7*   BUN 59*   CREATININE 2.21*   GLUCOSE 145*   CALCIUM 9.8         Lab 11/01/21  0933   TOTAL PROTEIN 6.7   ALBUMIN 3.00*   GLOBULIN 3.7   ALT (SGPT) 95*   AST (SGOT) 132*   BILIRUBIN 1.4*   ALK PHOS 592*                     UA    Urinalysis 6/30/21 6/30/21 9/30/21  10/4/21 10/4/21    1826 1826  1617 1617   Squamous Epithelial Cells, UA  None Seen   0-2   Specific Gravity, UA 1.011   1.025    Ketones, UA Negative  Trace (A) 15 mg/dL (1+) (A)    Blood, UA Negative   Negative    Leukocytes, UA Trace (A)  Small (1+) (A) Small (1+) (A)    Nitrite, UA Negative   Negative    RBC, UA  None Seen   0-2   WBC, UA  0-2   3-5 (A)   Bacteria, UA  None Seen   Trace   (A) Abnormal value              Microbiology Results (last 10 days)     Procedure Component Value - Date/Time    COVID PRE-OP / PRE-PROCEDURE SCREENING ORDER (NO ISOLATION) - Swab, Oropharynx [[id number]]  (Normal) Collected: 10/24/21 0936    Lab Status: Final result Specimen: Swab from Oropharynx Updated: 10/24/21 1649    Narrative:      The following orders were created for panel order COVID PRE-OP / PRE-PROCEDURE SCREENING ORDER (NO ISOLATION) - Swab, Oropharynx.  Procedure                               Abnormality         Status                     ---------                               -----------         ------                     COVID-19, APTIMA PANTHER...[[id number]]  Normal              Final result                 Please view results for these tests on the individual orders.    COVID-19, APTIMA PANTHER [facility] IN-HOUSE NP/OP SWAB IN UTM/VTM/SALINE TRANSPORT MEDIA 24HR TAT - Swab, Oropharynx [[id number]]  (Normal) Collected: 10/24/21 0936    Lab Status: Final result Specimen: Swab from Oropharynx Updated: 10/24/21 1649     COVID19 Not Detected    Narrative:      Fact sheet for providers: https://www.fda.gov/media/471890/download     Fact sheet for patients: https://www.fda.gov/media/295963/download    Test performed by RT PCR.          No radiology results from the last 24 hrs        Assessment/Plan   Assessment & Plan     Active Hospital Problems    Diagnosis  POA   • Acute renal failure (ARF) (HCC) [N17.9]  Yes   • Esophageal adenocarcinoma (HCC) [C15.9]  Yes   • Acute ischemic right MCA stroke, status post TPA and right  "thrombectomy  [I63.511]  Yes   • Essential hypertension [I10]  Yes   • CAD (coronary artery disease) [I25.10]  Yes   • Obesity (BMI 30-39.9) [E66.9]  Yes         Andry Potter is a 66 y.o. male with recent diagnosis of esophageal adenocarcinoma, previous CVA, obesity, HTN, CAD, T2DM who presents as a direct admission at the prompting of his oncologist, Dr Ernandez, due to abnl lab work. Patient was due to start chemotherapy for his esophageal cancer today, however on labs he was noted to have new abnls noting elevated bili 1.4, ALT 95, , WBC 14k, Cr 2.2. Patient was advised for direct admission for further treatment/workup.     Esophageal Adenocarcinoma, metastatic  --recently diagnosed, concern for metastatic dz in liver with CT imaging from 10/6 with multiple liver lesions  --due to start chemo today but abnl lab workup prompted admission  --consult to Dr Ernandez    Leukocytosis  --etiology unclear, has not had recent steroids  --send UA as below--> reveiwed and not c/w infection    LOTTIE on ? CKD  --previous Cr 1.3, but does appear to have some degree of CKD as has levels up to 2 in earlier Oct 2021  --IVF, urine lytes, strict I/Os  --consider nephro consult if no improvement    Elevated LFTs/Tbili  --liver function has progressively been worsening since earlier October (was normal previously)  --likely etiology is liver mets as CT imaging from 10/6 noting \"Extensive bilateral hypoenhancing liver masses, largest conglomerate  lesion near the right hepatic dome consistent with likely extensive  hepatic parenchymal metastatic disease\". Consider liver USN if worsening but given such recent imaging hold for now  --notably, GI saw during last admission and underwent EGD which led to diagnosis of esophageal cancer as above    HTN  --recently stopped BP medications due to weight loss given above malignancy. Monitor     History of CVA   ---continue home ASA/statin on hold given LFT bump    T2DM-- A1C 6.1 last " admit  --monitor, will add SSI     GERD  --continue PPI     DVT prophylaxis:  heparin      CODE STATUS: DNR DNI d/w him his living will  Code Status and Medical Interventions:   Ordered at: 11/01/21 1417     Limited Support to NOT Include:    Cardioversion/Defibrillation    Intubation    Vasopressors     Code Status:    No CPR     Medical Interventions (Level of Support Prior to Arrest):    Limited       Admission Status:  I believe this patient meets INPATIENT status due to close monitoring, IVF, oncology consult.  I feel patient’s risk for adverse outcomes and need for care warrant INPATIENT evaluation and I predict the patient’s care encounter to likely last beyond 2 midnights.      Cathleen Samaniego MD  11/01/21

## 2021-11-01 NOTE — TELEPHONE ENCOUNTER
CORINE FROM [facility] CALLED WITH HOSPITAL CONSULT. WARM TRANSFERRED TO OFFICE.

## 2021-11-02 ENCOUNTER — READMISSION MANAGEMENT (OUTPATIENT)
Dept: CALL CENTER | Facility: HOSPITAL | Age: 66
End: 2021-11-02

## 2021-11-02 LAB
ALBUMIN SERPL-MCNC: 3.2 G/DL (ref 3.5–5.2)
ALBUMIN/GLOB SERPL: 1 G/DL
ALP SERPL-CCNC: 718 U/L (ref 39–117)
ALT SERPL W P-5'-P-CCNC: 124 U/L (ref 1–41)
ANION GAP SERPL CALCULATED.3IONS-SCNC: 20 MMOL/L (ref 5–15)
AST SERPL-CCNC: 156 U/L (ref 1–40)
BASOPHILS # BLD AUTO: 0.01 10*3/MM3 (ref 0–0.2)
BASOPHILS NFR BLD AUTO: 0.1 % (ref 0–1.5)
BILIRUB SERPL-MCNC: 1.6 MG/DL (ref 0–1.2)
BUN SERPL-MCNC: 55 MG/DL (ref 8–23)
BUN/CREAT SERPL: 27.9 (ref 7–25)
CALCIUM SPEC-SCNC: 9.4 MG/DL (ref 8.6–10.5)
CHLORIDE SERPL-SCNC: 100 MMOL/L (ref 98–107)
CO2 SERPL-SCNC: 16 MMOL/L (ref 22–29)
CREAT SERPL-MCNC: 1.97 MG/DL (ref 0.76–1.27)
CYTO UR: NORMAL
DEPRECATED RDW RBC AUTO: 48.3 FL (ref 37–54)
EOSINOPHIL # BLD AUTO: 0.01 10*3/MM3 (ref 0–0.4)
EOSINOPHIL NFR BLD AUTO: 0.1 % (ref 0.3–6.2)
ERYTHROCYTE [DISTWIDTH] IN BLOOD BY AUTOMATED COUNT: 14.3 % (ref 12.3–15.4)
GFR SERPL CREATININE-BSD FRML MDRD: 34 ML/MIN/1.73
GLOBULIN UR ELPH-MCNC: 3.1 GM/DL
GLUCOSE BLDC GLUCOMTR-MCNC: 128 MG/DL (ref 70–130)
GLUCOSE BLDC GLUCOMTR-MCNC: 148 MG/DL (ref 70–130)
GLUCOSE BLDC GLUCOMTR-MCNC: 176 MG/DL (ref 70–130)
GLUCOSE BLDC GLUCOMTR-MCNC: 187 MG/DL (ref 70–130)
GLUCOSE SERPL-MCNC: 182 MG/DL (ref 65–99)
HCT VFR BLD AUTO: 33.2 % (ref 37.5–51)
HGB BLD-MCNC: 10.6 G/DL (ref 13–17.7)
IMM GRANULOCYTES # BLD AUTO: 0.08 10*3/MM3 (ref 0–0.05)
IMM GRANULOCYTES NFR BLD AUTO: 0.5 % (ref 0–0.5)
LAB AP CASE REPORT: NORMAL
LAB AP CLINICAL INFORMATION: NORMAL
LAB AP DIAGNOSIS COMMENT: NORMAL
LAB AP INTEGRATED ONCOLOGY, ADDENDUM: NORMAL
LYMPHOCYTES # BLD AUTO: 4.06 10*3/MM3 (ref 0.7–3.1)
LYMPHOCYTES NFR BLD AUTO: 26.5 % (ref 19.6–45.3)
Lab: NORMAL
MCH RBC QN AUTO: 30.1 PG (ref 26.6–33)
MCHC RBC AUTO-ENTMCNC: 31.9 G/DL (ref 31.5–35.7)
MCV RBC AUTO: 94.3 FL (ref 79–97)
MONOCYTES # BLD AUTO: 0.97 10*3/MM3 (ref 0.1–0.9)
MONOCYTES NFR BLD AUTO: 6.3 % (ref 5–12)
NEUTROPHILS NFR BLD AUTO: 10.19 10*3/MM3 (ref 1.7–7)
NEUTROPHILS NFR BLD AUTO: 66.5 % (ref 42.7–76)
NRBC BLD AUTO-RTO: 0 /100 WBC (ref 0–0.2)
PATH REPORT.ADDENDUM SPEC: NORMAL
PATH REPORT.FINAL DX SPEC: NORMAL
PATH REPORT.GROSS SPEC: NORMAL
PLATELET # BLD AUTO: 295 10*3/MM3 (ref 140–450)
PMV BLD AUTO: 10.2 FL (ref 6–12)
POTASSIUM SERPL-SCNC: 4.4 MMOL/L (ref 3.5–5.2)
PROT SERPL-MCNC: 6.3 G/DL (ref 6–8.5)
RBC # BLD AUTO: 3.52 10*6/MM3 (ref 4.14–5.8)
SODIUM SERPL-SCNC: 136 MMOL/L (ref 136–145)
SODIUM UR-SCNC: <20 MMOL/L
WBC # BLD AUTO: 15.32 10*3/MM3 (ref 3.4–10.8)

## 2021-11-02 PROCEDURE — 63710000001 INSULIN LISPRO (HUMAN) PER 5 UNITS: Performed by: INTERNAL MEDICINE

## 2021-11-02 PROCEDURE — 97165 OT EVAL LOW COMPLEX 30 MIN: CPT

## 2021-11-02 PROCEDURE — 99233 SBSQ HOSP IP/OBS HIGH 50: CPT | Performed by: INTERNAL MEDICINE

## 2021-11-02 PROCEDURE — 99232 SBSQ HOSP IP/OBS MODERATE 35: CPT | Performed by: INTERNAL MEDICINE

## 2021-11-02 PROCEDURE — 97162 PT EVAL MOD COMPLEX 30 MIN: CPT

## 2021-11-02 PROCEDURE — 85025 COMPLETE CBC W/AUTO DIFF WBC: CPT | Performed by: INTERNAL MEDICINE

## 2021-11-02 PROCEDURE — 63710000001 DEXAMETHASONE PER 0.25 MG: Performed by: NURSE PRACTITIONER

## 2021-11-02 PROCEDURE — 25010000002 HEPARIN (PORCINE) PER 1000 UNITS: Performed by: INTERNAL MEDICINE

## 2021-11-02 PROCEDURE — 82962 GLUCOSE BLOOD TEST: CPT

## 2021-11-02 PROCEDURE — 80053 COMPREHEN METABOLIC PANEL: CPT | Performed by: INTERNAL MEDICINE

## 2021-11-02 RX ORDER — AMOXICILLIN 250 MG
2 CAPSULE ORAL NIGHTLY
Status: DISCONTINUED | OUTPATIENT
Start: 2021-11-02 | End: 2021-11-03

## 2021-11-02 RX ORDER — ZOLPIDEM TARTRATE 5 MG/1
5 TABLET ORAL NIGHTLY
Status: DISCONTINUED | OUTPATIENT
Start: 2021-11-02 | End: 2021-11-03 | Stop reason: HOSPADM

## 2021-11-02 RX ORDER — ACETAMINOPHEN 325 MG/1
650 TABLET ORAL EVERY 4 HOURS PRN
Status: DISCONTINUED | OUTPATIENT
Start: 2021-11-02 | End: 2021-11-02

## 2021-11-02 RX ORDER — OXYCODONE HYDROCHLORIDE 5 MG/1
5 TABLET ORAL EVERY 6 HOURS PRN
Status: DISCONTINUED | OUTPATIENT
Start: 2021-11-02 | End: 2021-11-03 | Stop reason: HOSPADM

## 2021-11-02 RX ADMIN — ZOLPIDEM TARTRATE 5 MG: 5 TABLET ORAL at 20:39

## 2021-11-02 RX ADMIN — ASPIRIN 81 MG: 81 TABLET, COATED ORAL at 08:12

## 2021-11-02 RX ADMIN — NYSTATIN 500000 UNITS: 100000 SUSPENSION ORAL at 20:44

## 2021-11-02 RX ADMIN — OXYCODONE 5 MG: 5 TABLET ORAL at 14:24

## 2021-11-02 RX ADMIN — INSULIN LISPRO 2 UNITS: 100 INJECTION, SOLUTION INTRAVENOUS; SUBCUTANEOUS at 17:09

## 2021-11-02 RX ADMIN — DEXAMETHASONE 4 MG: 4 TABLET ORAL at 08:12

## 2021-11-02 RX ADMIN — SODIUM CHLORIDE 75 ML/HR: 9 INJECTION, SOLUTION INTRAVENOUS at 15:42

## 2021-11-02 RX ADMIN — METOCLOPRAMIDE 5 MG: 10 TABLET ORAL at 08:11

## 2021-11-02 RX ADMIN — METOCLOPRAMIDE 5 MG: 10 TABLET ORAL at 11:42

## 2021-11-02 RX ADMIN — DICLOFENAC 2 G: 10 GEL TOPICAL at 11:42

## 2021-11-02 RX ADMIN — DICLOFENAC 2 G: 10 GEL TOPICAL at 08:12

## 2021-11-02 RX ADMIN — NYSTATIN 500000 UNITS: 100000 SUSPENSION ORAL at 13:54

## 2021-11-02 RX ADMIN — HEPARIN SODIUM 5000 UNITS: 5000 INJECTION, SOLUTION INTRAVENOUS; SUBCUTANEOUS at 13:54

## 2021-11-02 RX ADMIN — MIRTAZAPINE 30 MG: 15 TABLET, FILM COATED ORAL at 20:39

## 2021-11-02 RX ADMIN — DICLOFENAC 2 G: 10 GEL TOPICAL at 17:08

## 2021-11-02 RX ADMIN — HEPARIN SODIUM 5000 UNITS: 5000 INJECTION, SOLUTION INTRAVENOUS; SUBCUTANEOUS at 21:26

## 2021-11-02 RX ADMIN — NYSTATIN 500000 UNITS: 100000 SUSPENSION ORAL at 17:08

## 2021-11-02 RX ADMIN — DICLOFENAC 2 G: 10 GEL TOPICAL at 20:41

## 2021-11-02 RX ADMIN — SODIUM CHLORIDE, PRESERVATIVE FREE 10 ML: 5 INJECTION INTRAVENOUS at 20:41

## 2021-11-02 RX ADMIN — DOCUSATE SODIUM 50MG AND SENNOSIDES 8.6MG 2 TABLET: 8.6; 5 TABLET, FILM COATED ORAL at 20:38

## 2021-11-02 RX ADMIN — INSULIN LISPRO 2 UNITS: 100 INJECTION, SOLUTION INTRAVENOUS; SUBCUTANEOUS at 12:44

## 2021-11-02 RX ADMIN — METOCLOPRAMIDE 5 MG: 10 TABLET ORAL at 17:08

## 2021-11-02 RX ADMIN — METOCLOPRAMIDE 5 MG: 10 TABLET ORAL at 20:39

## 2021-11-02 RX ADMIN — HEPARIN SODIUM 5000 UNITS: 5000 INJECTION, SOLUTION INTRAVENOUS; SUBCUTANEOUS at 05:37

## 2021-11-02 RX ADMIN — PANTOPRAZOLE SODIUM 40 MG: 40 TABLET, DELAYED RELEASE ORAL at 05:37

## 2021-11-02 RX ADMIN — SODIUM CHLORIDE, PRESERVATIVE FREE 10 ML: 5 INJECTION INTRAVENOUS at 08:12

## 2021-11-02 NOTE — THERAPY EVALUATION
Patient Name: Andry Potter  : 1955    MRN: [id number]                              Today's Date: 2021       Admit Date: 2021    Visit Dx: No diagnosis found.  Patient Active Problem List   Diagnosis   • Abnormal stress test   • Type 2 diabetes mellitus (HCC)   • Essential hypertension   • Dyslipidemia   • CAD (coronary artery disease)   • Obesity (BMI 30-39.9)   • Snoring   • Moderate obstructive sleep apnea   • Acute ischemic right MCA stroke, status post TPA and right thrombectomy    • CKD (chronic kidney disease) stage 2, GFR 60-89 ml/min   • Acute ischemic right middle cerebral artery (MCA) stroke (HCC)   • Moderate malnutrition (CMS/HCC)   • Abnormal CT of the abdomen   • Esophageal mass   • Esophageal adenocarcinoma (HCC)   • Acute renal failure (ARF) (HCC)     Past Medical History:   Diagnosis Date   • BPH (benign prostatic hyperplasia)    • Diabetes mellitus (HCC)    • Hyperlipidemia    • Hypertension    • Stroke (HCC)      Past Surgical History:   Procedure Laterality Date   • CARDIAC CATHETERIZATION N/A 2020    Procedure: Left Heart Cath 11878;  Surgeon: Bang Walker MD;  Location:  [facility] CATH INVASIVE LOCATION;  Service: Cardiology;  Laterality: N/A;   • COLONOSCOPY     • ENDOSCOPY N/A 10/6/2021    Procedure: ESOPHAGOGASTRODUODENOSCOPY;  Surgeon: Obed Galvez MD;  Location:  [facility] ENDOSCOPY;  Service: Gastroenterology;  Laterality: N/A;   • HAND SURGERY     • HIP BIPOLAR REPLACEMENT     • INTERVENTIONAL RADIOLOGY PROCEDURE N/A 2021    Procedure: IR MECHANICAL THROMBECTOMY - PRIMARY;  Surgeon: Dru Gomes MD;  Location:  [facility] CATH INVASIVE LOCATION;  Service: Interventional Radiology;  Laterality: N/A;      General Information     Row Name 21 1152          Physical Therapy Time and Intention    Document Type evaluation  -SS     Mode of Treatment physical therapy  -SS     Row Name 21 1152          General Information    Patient Profile Reviewed yes   -     Prior Level of Function independent:; all household mobility; community mobility; transfer; gait; bed mobility; ADL's; driving  -     Existing Precautions/Restrictions fall; other (see comments)  R subclavian port, monitor vitals  -     Barriers to Rehab medically complex  -     Row Name 11/02/21 1152          Living Environment    Lives With friend(s)  -     Row Name 11/02/21 1152          Home Main Entrance    Number of Stairs, Main Entrance two  -     Stair Railings, Main Entrance railing on right side (ascending)  -     Row Name 11/02/21 1152          Stairs Within Home, Primary    Stairs, Within Home, Primary flight to basement, does not need to navigate  -     Row Name 11/02/21 1152          Cognition    Orientation Status (Cognition) oriented x 3  -     Row Name 11/02/21 1152          Safety Issues, Functional Mobility    Safety Issues Affecting Function (Mobility) insight into deficits/self-awareness; safety precaution awareness; safety precautions follow-through/compliance; sequencing abilities  -     Impairments Affecting Function (Mobility) balance; endurance/activity tolerance; strength; postural/trunk control  -           User Key  (r) = Recorded By, (t) = Taken By, (c) = Cosigned By    Initials Name Provider Type     Cathleen Estrada, PT Physical Therapist               Mobility     Row Name 11/02/21 1154          Bed Mobility    Comment (Bed Mobility) pt in bathroom upon arrival, returned to bedside chair  -     Row Name 11/02/21 1154          Transfers    Comment (Transfers) VC for hand placement, lowering with eccentric control  -     Row Name 11/02/21 1154          Sit-Stand Transfer    Sit-Stand [place] (Transfers) contact guard; verbal cues  -     Assistive Device (Sit-Stand Transfers) other (see comments)  none  -     Row Name 11/02/21 1154          Gait/Stairs (Locomotion)    [place] Level (Gait) contact guard; verbal cues  -     Assistive Device  (Gait) other (see comments)  none  -SS     Distance in Feet (Gait) 200  -SS     Deviations/Abnormal Patterns (Gait) bilateral deviations; sania decreased; gait speed decreased; weight shifting decreased  -SS     Bilateral Gait Deviations forward flexed posture; heel strike decreased  -SS     Comment (Gait/Stairs) Pt ambulated with a step through gait pattern at a decreased speed. VC for safety recommendations, upright posture. Gait limited by fatigue, generalized weakness.  -           User Key  (r) = Recorded By, (t) = Taken By, (c) = Cosigned By    Initials Name Provider Type     Cathleen Estrada PT Physical Therapist               Obj/Interventions     Row Name 11/02/21 1156          Range of Motion Comprehensive    General Range of Motion bilateral lower extremity ROM WFL  -SS     Row Name 11/02/21 1156          Strength Comprehensive (MMT)    Comment, General Manual Muscle Testing (MMT) Assessment BLE gross 4/5  -     Row Name 11/02/21 1156          Motor Skills    Motor Skills functional endurance  -SS     Functional Endurance decreased functional endurance/generalized weakness  -     Row Name 11/02/21 1156          Balance    Balance Assessment sitting static balance; standing static balance; sitting dynamic balance; standing dynamic balance  -SS     Static Sitting Balance WFL; unsupported; sitting in chair  -SS     Dynamic Sitting Balance WFL; unsupported; sitting in chair  -SS     Static Standing Balance WFL; unsupported  -SS     Dynamic Standing Balance mild impairment; unsupported  -SS     Balance Interventions sitting; standing; sit to stand; supported; static; dynamic  -           User Key  (r) = Recorded By, (t) = Taken By, (c) = Cosigned By    Initials Name Provider Type     Cathleen Estrada PT Physical Therapist               Goals/Plan     Row Name 11/02/21 1159          Bed Mobility Goal 1 (PT)    Activity/Assistive Device (Bed Mobility Goal 1, PT) bed mobility activities, all  -SS      [place] Level/Cues Needed (Bed Mobility Goal 1, PT) independent  -SS     Time Frame (Bed Mobility Goal 1, PT) long term goal (LTG); 10 days  -SS     Row Name 11/02/21 1159          Transfer Goal 1 (PT)    Activity/Assistive Device (Transfer Goal 1, PT) transfers, all; sit-to-stand/stand-to-sit; bed-to-chair/chair-to-bed  -SS     [place] Level/Cues Needed (Transfer Goal 1, PT) independent  -SS     Time Frame (Transfer Goal 1, PT) long term goal (LTG); 10 days  -SS     Row Name 11/02/21 1159          Gait Training Goal 1 (PT)    Activity/Assistive Device (Gait Training Goal 1, PT) gait (walking locomotion)  -SS     [place] Level (Gait Training Goal 1, PT) independent  -SS     Distance (Gait Training Goal 1, PT) 500  -SS     Time Frame (Gait Training Goal 1, PT) long term goal (LTG); 10 days  -SS     Row Name 11/02/21 1159          Stairs Goal 1 (PT)    Activity/Assistive Device (Stairs Goal 1, PT) stairs, all skills; ascending stairs; using handrail, right; descending stairs; using handrail, left  -SS     [place] Level/Cues Needed (Stairs Goal 1, PT) modified independence  -SS     Number of Stairs (Stairs Goal 1, PT) 2  -SS     Time Frame (Stairs Goal 1, PT) long term goal (LTG); 10 days  -SS           User Key  (r) = Recorded By, (t) = Taken By, (c) = Cosigned By    Initials Name Provider Type    SS Cathleen Estrada, PT Physical Therapist               Clinical Impression     Row Name 11/02/21 115          Pain    Additional Documentation Pain Scale: Numbers Pre/Post-Treatment (Group)  -     Row Name 11/02/21 1159          Pain Scale: Numbers Pre/Post-Treatment    Pretreatment Pain Rating 0/10 - no pain  -     Posttreatment Pain Rating 0/10 - no pain  -     Pain Intervention(s) Repositioned; Ambulation/increased activity  -     Row Name 11/02/21 4245          Plan of Care Review    Plan of Care Reviewed With patient  -SS     Outcome Summary PT eval complete. Pt. performed sit to stand  transfer w/contact guard assist. He ambulated 200' w/no assistive device, contact guard assist. Gait limited by fatigue, generalized weakness. Recommend home with assist and home health PT upon discharge.  -     Row Name 11/02/21 1157          Therapy Assessment/Plan (PT)    Rehab Potential (PT) good, to achieve stated therapy goals  -     Criteria for Skilled Interventions Met (PT) yes; meets criteria; skilled treatment is necessary  -     Row Name 11/02/21 1157          Vital Signs    Pre Systolic BP Rehab 101  -SS     Pre Treatment Diastolic BP 60  -SS     Post Systolic BP Rehab 126  -SS     Post Treatment Diastolic BP 84  -SS     Pretreatment Heart Rate (beats/min) 92  -SS     Posttreatment Heart Rate (beats/min) 99  -SS     Post SpO2 (%) 96  -SS     O2 Delivery Post Treatment room air  -SS     Pre Patient Position Sitting  -SS     Post Patient Position Sitting  -     Row Name 11/02/21 1157          Positioning and Restraints    Pre-Treatment Position bathroom  -SS     Post Treatment Position chair  -SS     In Chair notified nsg; reclined; call light within reach; encouraged to call for assist; exit alarm on; legs elevated  -           User Key  (r) = Recorded By, (t) = Taken By, (c) = Cosigned By    Initials Name Provider Type    SS Cathleen Estrada, PT Physical Therapist               Outcome Measures     Row Name 11/02/21 1200 11/02/21 0800       How much help from another person do you currently need...    Turning from your back to your side while in flat bed without using bedrails? 3  -SS 4  -LC    Moving from lying on back to sitting on the side of a flat bed without bedrails? 3  -SS 4  -LC    Moving to and from a bed to a chair (including a wheelchair)? 3  -SS 3  -LC    Standing up from a chair using your arms (e.g., wheelchair, bedside chair)? 3  -SS 3  -LC    Climbing 3-5 steps with a railing? 3  -SS 3  -LC    To walk in hospital room? 3  -SS 3  -LC    AM-PAC 6 Clicks Score (PT) 18  -SS 20  -LC     Row Name 11/02/21 1200 11/02/21 1007       Functional Assessment    Outcome Measure Options AM-PAC 6 Clicks Basic Mobility (PT)  -SS AM-PAC 6 Clicks Daily Activity (OT)  -          User Key  (r) = Recorded By, (t) = Taken By, (c) = Cosigned By    Initials Name Provider Type     Cristina Raymundo, RN Registered Nurse    Jeancarlos Pathak, OT Occupational Therapist    Cathleen Shelton, PT Physical Therapist                             Physical Therapy Education                 Title: PT OT SLP Therapies (In Progress)     Topic: Physical Therapy (In Progress)     Point: Mobility training (Done)     Learning Progress Summary           Patient Eager, E, VU,NR by  at 11/2/2021 1200    Comment: Educated pt. safety/technique with transfers, ambulation, PT POC                   Point: Home exercise program (Not Started)     Learner Progress:  Not documented in this visit.          Point: Body mechanics (Done)     Learning Progress Summary           Patient Eager, E, VU,NR by  at 11/2/2021 1200    Comment: Educated pt. safety/technique with transfers, ambulation, PT POC                   Point: Precautions (Done)     Learning Progress Summary           Patient Eager, E, VU,NR by  at 11/2/2021 1200    Comment: Educated pt. safety/technique with transfers, ambulation, PT POC                               User Key     Initials Effective Dates Name Provider Type [facility] 06/01/21 -  Cathleen Estrada, PT Physical Therapist PT              PT Recommendation and Plan  Planned Therapy Interventions (PT): balance training, bed mobility training, gait training, home exercise program, neuromuscular re-education, patient/family education, postural re-education, ROM (range of motion), stair training, strengthening, stretching, transfer training  Plan of Care Reviewed With: patient  Outcome Summary: PT eval complete. Pt. performed sit to stand transfer w/contact guard assist. He ambulated 200' w/no assistive device,  contact guard assist. Gait limited by fatigue, generalized weakness. Recommend home with assist and home health PT upon discharge.     Time Calculation:    PT Charges     Row Name 11/02/21 1201             Time Calculation    Start Time 0934  -SS      Stop Time 0947  -SS      Time Calculation (min) 13 min  -SS      PT Received On 11/02/21  -SS      PT Goal Re-Cert Due Date 11/12/21  -SS              Untimed Charges    PT Eval/Re-eval Minutes 50  -SS              Total Minutes    Untimed Charges Total Minutes 50  -SS       Total Minutes 50  -SS            User Key  (r) = Recorded By, (t) = Taken By, (c) = Cosigned By    Initials Name Provider Type    SS Cathleen Estrada, PT Physical Therapist              Therapy Charges for Today     Code Description Service Date Service Provider Modifiers Qty    [id number] HC PT EVAL MOD COMPLEXITY 4 11/2/2021 Cathleen Estrada, PT GP 1          PT G-Codes  Outcome Measure Options: AM-PAC 6 Clicks Basic Mobility (PT)  AM-PAC 6 Clicks Score (PT): 18  AM-PAC 6 Clicks Score (OT): 22    Cathleen Estrada PT  11/2/2021

## 2021-11-02 NOTE — PLAN OF CARE
Alert and oriented.  Respirations unlabored.  Monitor SR w/inverted T wave.  Face jaundice with petechiae.  Roxicodone once for generalized pain.  Portacath infusing.  Appetite fair.  Ambulated in [place] with standby assist.  Family bedside for visit.

## 2021-11-02 NOTE — PROGRESS NOTES
Malnutrition Severity Assessment    Patient Name:  Andry Potter  YOB: 1955  MRN: [id number]  Admit Date:  11/1/2021    Patient meets criteria for : Moderate (non-severe) Malnutrition (Pt meets criteria for moderate (non-severe) malnutrition in the context of chronic illness as evidenced by moderate muscle loss and moderate subcutaneous fat loss; 53 lb unintentional wt loss over 12 months -- 23% (severe).)    Malnutrition Severity Assessment  Malnutrition Type: Chronic Disease - Related Malnutrition  Malnutrition Type (last 8 hours)     Malnutrition Severity Assessment     Row Name 11/02/21 1147       Malnutrition Severity Assessment    Malnutrition Type Chronic Disease - Related Malnutrition    Row Name 11/02/21 1147       Unintentional Weight Loss     Unintentional Weight Loss Findings Severe    Unintentional Weight Loss  Weight loss greater than 20% in one year    Row Name 11/02/21 1147       Muscle Loss    Loss of Muscle Mass Findings Moderate    Judaism Region Moderate - slight depression    Clavicle Bone Region Moderate - some protrusion in females, visible in males    Acromion Bone Region Moderate - acromion may slightly protrude    Anterior Thigh Region Moderate - mild depression on inner thigh    Posterior Calf Region Moderate - some roundness, slight firmness    Row Name 11/02/21 1147       Fat Loss    Subcutaneous Fat Loss Findings Moderate    Orbital Region  Moderate -  somewhat hollowness, slightly dark circles    Upper Arm Region Moderate - some fat tissue, not ample    Row Name 11/02/21 1147       Criteria Met (Must meet criteria for severity in at least 2 of these categories: M Wasting, Fat Loss, Fluid, Secondary Signs, Wt. Status, Intake)    Patient meets criteria for  Moderate (non-severe) Malnutrition  Pt meets criteria for moderate (non-severe) malnutrition in the context of chronic illness as evidenced by moderate muscle loss and moderate subcutaneous fat loss; 53 lb unintentional  wt loss over 12 months -- 23% (severe).                Electronically signed by:  Chilango Centeno RD  11/02/21 11:50 EDT

## 2021-11-02 NOTE — PROGRESS NOTES
"Palliative Care Progress Note    Date of Admission: 11/1/2021    Code Status:   Current Code Status     Date Active Code Status Order ID Comments User Context       11/2/2021 0738 No CPR (Do Not Attempt to Resuscitate) [id number]  Matilde Macedo MD Inpatient     Advance Care Planning Activity      Questions for Current Code Status     Question Answer    Code Status (Patient has no pulse and is not breathing) No CPR (Do Not Attempt to Resuscitate)    Medical Interventions (Patient has pulse or is breathing) Limited Support    Medical Intervention Limits: NO intubation (DNI)     NO vasopressors     NO cardioversion    Level Of Support Discussed With Patient        Subjective:  Patient more awake, slightly lethargy, received oxy IR 5mg around one hour prior to visit. Reports had abdominal pain and pain med reduced pain \"somewhat\"  + constipation - last BM over weekend.   No nausea, able to eat both breakfast and lunch with no emesis, but intermittent hiccups.  Ambulated length of halls with therapy today  No anxiety.    + insomnia, received ambien later - early am. Reports usually takes ambien nightly scheduled  Sister, Rubina, at bedside    Reviewed current scheduled and prn medications for route, type, dose, and frequency.  Reviewed medical record  sodium chloride, 75 mL/hr, Last Rate: 75 mL/hr (11/02/21 1542)      dextrose  •  dextrose  •  glucagon (human recombinant)  •  ondansetron  •  oxyCODONE  •  sodium chloride    Objective:  PPS 60% /75 (BP Location: Left arm, Patient Position: Lying)   Pulse 79   Temp 97.7 °F (36.5 °C) (Oral)   Resp 18   Ht 177.8 cm (70\")   Wt 82.6 kg (182 lb)   SpO2 96%   BMI 26.11 kg/m²    Intake & Output (last day)       11/01 0701 11/02 0700 11/02 0701 11/03 0700    P.O. 525     I.V. (mL/kg)  482 (5.8)    Total Intake(mL/kg) 525 (6.4) 482 (5.8)    Net +525 +482          Urine Unmeasured Occurrence 7 x 5 x        Lab Results (last 24 hours)     Procedure Component Value " Units Date/Time    POC Glucose Once [[id number]]  (Abnormal) Collected: 11/02/21 1213    Specimen: Blood Updated: 11/02/21 1215     Glucose 176 mg/dL      Comment: Meter: [id number] : [id number] Braydon Acevedo       Comprehensive Metabolic Panel [[id number]]  (Abnormal) Collected: 11/02/21 0957    Specimen: Blood Updated: 11/02/21 1116     Glucose 182 mg/dL      BUN 55 mg/dL      Creatinine 1.97 mg/dL      Sodium 136 mmol/L      Potassium 4.4 mmol/L      Chloride 100 mmol/L      CO2 16.0 mmol/L      Calcium 9.4 mg/dL      Total Protein 6.3 g/dL      Albumin 3.20 g/dL      ALT (SGPT) 124 U/L      AST (SGOT) 156 U/L      Alkaline Phosphatase 718 U/L      Total Bilirubin 1.6 mg/dL      eGFR Non African Amer 34 mL/min/1.73      Globulin 3.1 gm/dL      Comment: Calculated Result        A/G Ratio 1.0 g/dL      BUN/Creatinine Ratio 27.9     Anion Gap 20.0 mmol/L     Narrative:      GFR Normal >60  Chronic Kidney Disease <60  Kidney Failure <15      CBC & Differential [[id number]]  (Abnormal) Collected: 11/02/21 0957    Specimen: Blood Updated: 11/02/21 1052    Narrative:      The following orders were created for panel order CBC & Differential.  Procedure                               Abnormality         Status                     ---------                               -----------         ------                     CBC Auto Differential[[id number]]        Abnormal            Final result                 Please view results for these tests on the individual orders.    CBC Auto Differential [[id number]]  (Abnormal) Collected: 11/02/21 0957    Specimen: Blood Updated: 11/02/21 1052     WBC 15.32 10*3/mm3      RBC 3.52 10*6/mm3      Hemoglobin 10.6 g/dL      Hematocrit 33.2 %      MCV 94.3 fL      MCH 30.1 pg      MCHC 31.9 g/dL      RDW 14.3 %      RDW-SD 48.3 fl      MPV 10.2 fL      Platelets 295 10*3/mm3      Neutrophil % 66.5 %      Lymphocyte % 26.5 %      Monocyte % 6.3 %      Eosinophil % 0.1 %      Basophil % 0.1 %       Immature Grans % 0.5 %      Neutrophils, Absolute 10.19 10*3/mm3      Lymphocytes, Absolute 4.06 10*3/mm3      Monocytes, Absolute 0.97 10*3/mm3      Eosinophils, Absolute 0.01 10*3/mm3      Basophils, Absolute 0.01 10*3/mm3      Immature Grans, Absolute 0.08 10*3/mm3      nRBC 0.0 /100 WBC     Sodium, Urine, Random - Urine, Clean Catch [[id number]] Collected: 11/01/21 1522    Specimen: Urine, Clean Catch Updated: 11/02/21 0816     Sodium, Urine <20 mmol/L     Narrative:      Reference intervals for random urine have not been established.  Clinical usage is dependent upon physician's interpretation in combination with other laboratory tests.       POC Glucose Once [[id number]]  (Normal) Collected: 11/02/21 0735    Specimen: Blood Updated: 11/02/21 0736     Glucose 128 mg/dL      Comment: Meter: [id number] : [id number] Braydon Acevedo       Creatinine, Urine, Random - Urine, Clean Catch [[id number]] Collected: 11/01/21 1523    Specimen: Urine, Clean Catch Updated: 11/01/21 2345     Creatinine, Urine 96.5 mg/dL     Narrative:      Reference intervals for random urine have not been established.  Clinical usage is dependent upon physician's interpretation in combination with other laboratory tests.       POC Glucose Once [[id number]]  (Normal) Collected: 11/01/21 1716    Specimen: Blood Updated: 11/01/21 1717     Glucose 125 mg/dL      Comment: Meter: [id number] : [id number] Carson       COVID PRE-OP / PRE-PROCEDURE SCREENING ORDER (NO ISOLATION) - Swab, Nasopharynx [[id number]]  (Normal) Collected: 11/01/21 1439    Specimen: Swab from Nasopharynx Updated: 11/01/21 1707    Narrative:      The following orders were created for panel order COVID PRE-OP / PRE-PROCEDURE SCREENING ORDER (NO ISOLATION) - Swab, Nasopharynx.  Procedure                               Abnormality         Status                     ---------                               -----------         ------                     COVID-19,  ABBOTT IN-HOUS...[[id number]]  Normal              Final result                 Please view results for these tests on the individual orders.    COVID-19, ABBOTT IN-HOUSE,NASAL Swab (NO TRANSPORT MEDIA) 2 HR TAT - Swab, Nasopharynx [[id number]]  (Normal) Collected: 11/01/21 1439    Specimen: Swab from Nasopharynx Updated: 11/01/21 1707     COVID19 Presumptive Negative    Narrative:      Fact sheet for providers: https://www.fda.gov/media/490423/download     Fact sheet for patients: https://www.fda.gov/media/843429/download    Test performed by PCR.  If inconsistent with clinical signs and symptoms patient should be tested with different authorized molecular test.        Imaging Results (Last 24 Hours)     ** No results found for the last 24 hours. **          Physical Exam:  Gen: NAD, up in chair  Skin: warm, dry   Eyes: EVETTE, conjunctivae clear and moist   Resp/thorax: even effort, nonlabored, CTA   CV: RRR   ABD: soft, bowel sounds +, nontender  Ext: no edema, no redness   Neuro: awake but lethargic, interactive, no myoclonus     Reviewed labs and diagnostic results.   Lab Results   Component Value Date    HGBA1C 6.10 (H) 10/01/2021     Results from last 7 days   Lab Units 11/02/21  0957   WBC 10*3/mm3 15.32*   HEMOGLOBIN g/dL 10.6*   HEMATOCRIT % 33.2*   PLATELETS 10*3/mm3 295     Results from last 7 days   Lab Units 11/02/21  0957   SODIUM mmol/L 136   POTASSIUM mmol/L 4.4   CHLORIDE mmol/L 100   CO2 mmol/L 16.0*   BUN mg/dL 55*   CREATININE mg/dL 1.97*   CALCIUM mg/dL 9.4   BILIRUBIN mg/dL 1.6*   ALK PHOS U/L 718*   ALT (SGPT) U/L 124*   AST (SGOT) U/L 156*   GLUCOSE mg/dL 182*       Impression: 66 y.o. male with esophageal adenocarcinoma stage IV    Plan:   Left shoulder/base of neck pain - diminished with diclofenac ointment    Abd pain - prn oxy IR 5mg, recommend reduce dose to 2.5mg if patient attributes any additional drowsiness to it.  Senna/docusate dose scheduled tonight  Dexamethasone will be effective  for any liver or tumor burden pain    Anorexia, dyspepsia, reflux - overall improved, continue scheduled dexamethasone 4mg daily with breakfast.   Noted started on 11/1 - may contribute to leukocytosis    Insomnia - scheduled ambien nightly, patient usually takes at night    Goals of care discussion - patient is better today, ambulated in halls and able to eat both meals without emesis, having some belches.   Talked about giving chemotherapy a try, patient seems unsure if it will give him more time and if it will be worth it.   Palliative team provide support to patient/sister  Time: 50 minutes   > 50% time spent in counseling and education concerning current orders for symptom management with patient and sister    Trinidad Estrella, LIZBETH  [phone]  11/02/21  16:30 EDT

## 2021-11-02 NOTE — PROGRESS NOTES
Clinical Nutrition   Reason For Visit: MST score 2+    Patient Name: Andry Potter  YOB: 1955  MRN: [id number]  Date of Encounter: 11/02/21 11:36 EDT  Admission date: 11/1/2021     Comments:  - Pt meets criteria for moderate (non-severe) malnutrition in the context of chronic illness as evidenced by moderate muscle loss and moderate subcutaneous fat loss; 53 lb unintentional wt loss over 12 months -- 23% (severe). See MSA note.    Nutrition Assessment     Admission Problem List:    Essential hypertension    CAD (coronary artery disease)    Obesity (BMI 30-39.9)    Acute ischemic right MCA stroke, status post TPA and right thrombectomy     Esophageal adenocarcinoma (HCC)    Acute renal failure (ARF) (HCC)    FTT    Elevated LFTs    Applicable medical tests/procedures since admission:  Oncology following. Palliative consult pending.    PMH: He  has a past medical history of BPH (benign prostatic hyperplasia), Diabetes mellitus (HCC), Hyperlipidemia, Hypertension, and Stroke (HCC).   PSxH: He  has a past surgical history that includes Hip Bipolar; Hand surgery; Colonoscopy; Cardiac catheterization (N/A, 9/9/2020); Interventional radiology procedure (N/A, 6/29/2021); and Esophagogastroduodenoscopy (N/A, 10/6/2021).     Reported/Observed/Food/Nutrition Related History     Pt sitting in chair at time of RD visit. Pt reported UBW of 235 lbs earlier this year with unintentional wt loss related to poor appetite. Pt reported food has not tasted good and he has had occasional N/V symptoms. Pt had an episode of vomiting on Monday PTA. NKFA. No chewing or swallowing issues. Pt drinks 3-4 cartons of ONS daily. Pt agreeable to vanilla ONS on trays. Pt reported he ate about 50% of tray this AM for breakfast. RD encouraged intake.    Anthropometrics   Height: 70 in  Weight: 182 lb (standing scale on 11/1)  BMI: 26.11  BMI classification: Overweight: 25.0-29.9kg/m2    IBW: 160 lb  UBW: 235 lb per pt report  Weight  change: 53 lb unintentional wt loss over 12 months -- 23% (severe).    Weight Weight (kg) Weight (lbs) Weight Method   11/1/2021 82.555 kg 182 lb Standing scale   11/1/2021 80.65 kg 177 lb 12.8 oz -   10/21/2021 84.823 kg 187 lb -   10/11/2021 88.451 kg 195 lb -   10/11/2021 88.27 kg 194 lb 9.6 oz -   10/6/2021 90.266 kg 199 lb Stated   10/5/2021 90.447 kg 199 lb 6.4 oz Standing scale   10/4/2021 88.905 kg 196 lb Stated   10/4/2021 89.086 kg 196 lb 6.4 oz -   10/4/2021 89.359 kg 197 lb -   9/30/2021 87.091 kg 192 lb -   9/21/2021 92.08 kg 203 lb -   6/29/2021 102.059 kg 225 lb Stated   1/29/2021 107.14 kg 236 lb 3.2 oz -   11/5/2020 102.785 kg 226 lb 9.6 oz -     Nutrition Focused Physical Exam - 11/2     Subcutaneous fat:  Orbital Moderate   Buccal Moderate   Tricep Moderate   Ribs- thoracic and lumbar region, lower back, midaxillary line Unable to determine at this time     Muscle:  Temple/temporalis Moderate   Clavicle/acromion- pectoralis, deltoid Moderate   Shoulder- deltoid Moderate   Scapula- trapezius, latissimus dorsi Unable to determine at this time   Interosseous- dorsal hand Unable to determine at this time   Thigh- quadriceps Moderate   Calf- gastrocnemius Moderate     Labs reviewed   Labs reviewed: Yes    Results from last 7 days   Lab Units 11/02/21  0957 11/01/21  0933   SODIUM mmol/L 136 133*   POTASSIUM mmol/L 4.4 4.1   CHLORIDE mmol/L 100 98   CO2 mmol/L 16.0* 18.3*   BUN mg/dL 55* 59*   CREATININE mg/dL 1.97* 2.21*   GLUCOSE mg/dL 182* 145*   CALCIUM mg/dL 9.4 9.8     Results from last 7 days   Lab Units 11/02/21  0957 11/01/21  0933   WBC 10*3/mm3 15.32* 14.66*   ALBUMIN g/dL 3.20* 3.00*     Results from last 7 days   Lab Units 11/02/21  0735 11/01/21  1716   GLUCOSE mg/dL 128 125     Lab Results   Lab Value Date/Time    HGBA1C 6.10 (H) 10/01/2021 0718    HGBA1C 6.20 (H) 06/30/2021 0410     Medications reviewed   Medications reviewed: Yes  Pertinent: decadron, heparin, protonix, insulin, reglan,  remeron  NS at 75 ml/hr    Current Nutrition Prescription   PO: Diet Regular  Oral Nutrition Supplement:     Average PO intake: 25% / 1 meal    Nutrition Diagnosis     Problem Malnutrition    Etiology Energy needs > energy intake; cancer   Signs/Symptoms Moderate muscle loss and moderate subcutaneous fat loss; 53 lb unintentional wt loss over 12 months -- 23% (severe).     Intervention   Intervention: Follow treatment progress, Care plan reviewed, Interview for preferences, Encourage intake, Supplement provided    - RD ordering vanilla Boost Glucose Control TID.    Goal:   General: Nutrition support treatment  PO: Establish PO, Tolerate PO     Additional goals: No further weight loss    Monitoring/Evaluation:   Monitoring/Evaluation: Per protocol, PO intake, Supplement intake, Pertinent labs, Weight, Symptoms, POC/GOC    Chilango Centeno RD  Time Spent: 40 min

## 2021-11-02 NOTE — PLAN OF CARE
Problem: Adult Inpatient Plan of Care  Goal: Plan of Care Review  Recent Flowsheet Documentation  Taken 11/2/2021 0958 by Jeancarlos Hemphill OT  Progress: (OT evaluation completed) no change  Plan of Care Reviewed With: patient  Outcome Summary: Initial OT evaluation completed. Pt presents w/ generalized deconditioning, however at baseline for ADL performance. Pt supervision (IV mngt) for transfers, in-room mobility, toileting, and sinkside grooming - SBA for dynamic sit-stand while donning pants. OT signing off, defer to PT for further mobility needs. Recommend d/c to home w/ assist prn.

## 2021-11-02 NOTE — OUTREACH NOTE
Medical Week 4 Survey      Responses   [facility] patient discharged from? [place]   Does the patient have one of the following disease processes/diagnoses(primary or secondary)? Other   Week 4 attempt successful? No   Revoke Readmitted          Dhara Giles RN

## 2021-11-02 NOTE — PROGRESS NOTES
[facility] Medicine Services  PROGRESS NOTE    Patient Name: Andry Potter  : 1955  MRN: [id number]    Date of Admission: 2021  Primary Care Physician: Meghan Watt APRN    Subjective   Subjective     CC:  Failure to thrive, LOTTIE    HPI:  Patient did not eat much breakfast this morning.  He does not have an appetite.  Has been drinking very well at home.  Overall feeling a bit stronger today.    ROS:  General: denies fevers or chills  CV: denies chest pain  Resp: denies shortness of breath  Abd: denies abd pain, nausea      Objective   Objective     Vital Signs:   Temp:  [97.3 °F (36.3 °C)-98.4 °F (36.9 °C)] 98 °F (36.7 °C)  Heart Rate:  [78-96] 81  Resp:  [16-18] 18  BP: (109-130)/(63-75) 130/75     Physical Exam:  Constitutional: No acute distress, sitting in bedside chair, awake and alert  Respiratory: Clear to auscultation bilaterally, respiratory effort normal on room air  Cardiovascular: RRR, no murmurs, rubs, or gallops  Gastrointestinal: Positive bowel sounds, soft, nontender, nondistended  Musculoskeletal: No lower extremity edema  Psychiatric: Appropriate affect, cooperative  Neurologic: Oriented x 3, no focal neurological deficits  Skin: No rashes      Results Reviewed:  LAB RESULTS:      Lab 21  0933   WBC 14.66*   HEMOGLOBIN 10.5*   HEMATOCRIT 31.8*   PLATELETS 265   NEUTROS ABS 10.26*   MCV 91.4         Lab 21  0933   SODIUM 133*   POTASSIUM 4.1   CHLORIDE 98   CO2 18.3*   ANION GAP 16.7*   BUN 59*   CREATININE 2.21*   GLUCOSE 145*   CALCIUM 9.8         Lab 21  0933   TOTAL PROTEIN 6.7   ALBUMIN 3.00*   GLOBULIN 3.7   ALT (SGPT) 95*   AST (SGOT) 132*   BILIRUBIN 1.4*   ALK PHOS 592*                     Brief Urine Lab Results  (Last result in the past 365 days)      Color   Clarity   Blood   Leuk Est   Nitrite   Protein   CREAT   Urine HCG        21 1523             96.5               Microbiology Results Abnormal     Procedure Component  Value - Date/Time    COVID PRE-OP / PRE-PROCEDURE SCREENING ORDER (NO ISOLATION) - Swab, Nasopharynx [[id number]]  (Normal) Collected: 11/01/21 1439    Lab Status: Final result Specimen: Swab from Nasopharynx Updated: 11/01/21 1707    Narrative:      The following orders were created for panel order COVID PRE-OP / PRE-PROCEDURE SCREENING ORDER (NO ISOLATION) - Swab, Nasopharynx.  Procedure                               Abnormality         Status                     ---------                               -----------         ------                     COVID-19, ABBOTT IN-HOUS...[[id number]]  Normal              Final result                 Please view results for these tests on the individual orders.    COVID-19, ABBOTT IN-HOUSE,NASAL Swab (NO TRANSPORT MEDIA) 2 HR TAT - Swab, Nasopharynx [[id number]]  (Normal) Collected: 11/01/21 1439    Lab Status: Final result Specimen: Swab from Nasopharynx Updated: 11/01/21 1707     COVID19 Presumptive Negative    Narrative:      Fact sheet for providers: https://www.fda.gov/media/764345/download     Fact sheet for patients: https://www.fda.gov/media/657035/download    Test performed by PCR.  If inconsistent with clinical signs and symptoms patient should be tested with different authorized molecular test.          No radiology results from the last 24 hrs        I have reviewed the medications:  Scheduled Meds:aspirin, 81 mg, Oral, Daily  dexamethasone, 4 mg, Oral, Daily With Breakfast  Diclofenac Sodium, 2 g, Topical, 4x Daily  heparin (porcine), 5,000 Units, Subcutaneous, Q8H  insulin lispro, 0-7 Units, Subcutaneous, TID AC  metoclopramide, 5 mg, Oral, 4x Daily AC & at Bedtime  mirtazapine, 30 mg, Oral, Nightly  pantoprazole, 40 mg, Oral, Q AM  sodium chloride, 10 mL, Intravenous, Q12H      Continuous Infusions:sodium chloride, 75 mL/hr, Last Rate: 75 mL/hr (11/02/21 0320)      PRN Meds:.dextrose  •  dextrose  •  glucagon (human recombinant)  •  ondansetron  •  sodium  "chloride  •  zolpidem    Assessment/Plan   Assessment & Plan     Active Hospital Problems    Diagnosis  POA   • Acute renal failure (ARF) (HCC) [N17.9]  Yes   • Esophageal adenocarcinoma (HCC) [C15.9]  Yes   • Acute ischemic right MCA stroke, status post TPA and right thrombectomy  [I63.511]  Yes   • Essential hypertension [I10]  Yes   • CAD (coronary artery disease) [I25.10]  Yes   • Obesity (BMI 30-39.9) [E66.9]  Yes      Resolved Hospital Problems   No resolved problems to display.        Andry Potter is a 66 y.o. male with recent diagnosis of esophageal adenocarcinoma, previous CVA, obesity, HTN, CAD, T2DM who presents as a direct admission at the prompting of his oncologist, Dr Ernandez, due to abnl lab work. Patient was due to start chemotherapy for his esophageal cancer today, however on labs he was noted to have new abnls noting elevated bili 1.4, ALT 95, , WBC 14k, Cr 2.2. Patient was advised for direct admission for further treatment/workup.      Esophageal Adenocarcinoma, metastatic  --recently diagnosed, concern for metastatic dz in liver with CT imaging from 10/6 with multiple liver lesions  --due to start chemo today but abnl lab workup prompted admission  - Dr. Ernandez consulted. Patient interested in aggressive systemic therapy. Needs to increase oral intake and work with PT/OT  -Encouraged him to work with physical therapy today and eat food.     Leukocytosis  --etiology unclear, has not had recent steroids  --UA no consistent with UTI  - Awaiting repeat CBC     LOTTIE on ? CKD  --previous Cr 1.3, but does appear to have some degree of CKD as has levels up to 2 in earlier Oct 2021  --Continue IVFs  --consider nephro consult if no improvement  - Awaiting BMP this am     Elevated LFTs/Tbili  --liver function has progressively been worsening since earlier October (was normal previously)  --likely etiology is liver mets as CT imaging from 10/6 noting \"Extensive bilateral hypoenhancing liver masses, " "largest conglomerate lesion near the right hepatic dome consistent with likely extensive  hepatic parenchymal metastatic disease\". Consider liver USN if worsening but given such recent imaging hold for now  --notably, GI saw during last admission and underwent EGD which led to diagnosis of esophageal cancer as above     HTN  --recently stopped BP medications due to weight loss given above malignancy. Monitor      History of CVA   ---continue home ASA/statin on hold given LFT bump     T2DM-- A1C 6.1 last admit  --monitor, will add SSI      GERD  --continue PPI      DVT prophylaxis:  heparin    DVT prophylaxis:  Medical DVT prophylaxis orders are present.       AM-PAC 6 Clicks Score (PT): 21 (11/01/21 1945)    Disposition: I expect the patient to be discharged TBD.    CODE STATUS:   Code Status and Medical Interventions:   Ordered at: 11/01/21 1417     Limited Support to NOT Include:    Cardioversion/Defibrillation    Intubation    Vasopressors     Code Status:    No CPR     Medical Interventions (Level of Support Prior to Arrest):    Limited     This patient's problems and plans were partially entered by my partner and updated as appropriate by me 11/02/21. Today is my first day evaluating this patient's active medical problems. I Personally reviewed chart and adjusted note to reflect daily changes in management/clinical condition      Matilde Macedo MD  11/02/21              "

## 2021-11-02 NOTE — PLAN OF CARE
Problem: Palliative Care  Goal: Enhanced Quality of Life  Intervention: Promote Advance Care Planning  Flowsheets (Taken 11/2/2021 1030)  Life Transition/Adjustment:   palliative care discussed   palliative care initiated     Problem: Palliative Care  Goal: Enhanced Quality of Life  Intervention: Optimize Psychosocial Wellbeing  Flowsheets (Taken 11/2/2021 1030)  Supportive Measures:   verbalization of feelings encouraged   decision-making supported   active listening utilized   Goal Outcome Evaluation:  Plan of Care Reviewed With: patient        Progress: no change  Outcome Summary: new palliative consult for symptom management from Dr Samaniego.  Pt somewhat tired today and was awakened in the night. Pt was able to eat most of his breakfast without any nausea or reflux. Pt's left shoulder pain well controlled with diclofenac ointment, dexamethasone and prn tylenol.  Pt plans to pursue palliative chemo. continue palliative support. Pt is limited interventions. Living will on file with Angeli Potts as hcs. Team Meeting 1300 Des Friedman DO,  Lise Johnson LCSW, Trinidad Estrella  APRN, Pamella Parker RN CHPN, Yohana Romero RN,  Nidhi Miller RN OCN, BIA, Angella Schmidt RN CHPN

## 2021-11-02 NOTE — PROGRESS NOTES
HEMATOLOGY/ONCOLOGY PROGRESS NOTE    Subjective      CC: Metastatic esophageal adenocarcinoma.  Failure to thrive    SUBJECTIVE:   No acute events overnight.  Patient resting comfortably in chair this morning.  States that he feels a little better after IV fluids and dexamethasone yesterday.  States he was able to tolerate his breakfast this morning.  Denies any nausea or vomiting or abdominal pain.        Past Medical History, Past Surgical History, Social History, Family History have been reviewed and are without significant changes except as mentioned.      Medications:  The current medication list was reviewed in the EMR    ALLERGIES: No Known Allergies    ROS:  A comprehensive 10 point review of systems was performed and was negative except as mentioned.      Objective      Vitals:    11/02/21 0345 11/02/21 0500 11/02/21 0736 11/02/21 0800   BP: 130/75  136/91    BP Location: Left arm  Left arm    Patient Position: Lying  Lying    Pulse: 81 77 81 91   Resp: 18  18    Temp: 98 °F (36.7 °C)  98 °F (36.7 °C)    TempSrc: Oral  Oral    SpO2: 95% 94% 96% 96%   Weight:       Height:              General: Frail appearing, in no acute distress  HEENT: sclerae anicteric, oropharynx clear  Lymphatics: no cervical, supraclavicular, inguinal, or axillary adenopathy  Cardiovascular: regular rate and rhythm, no murmurs  Lungs: clear to auscultation bilaterally  Abdomen: soft, nontender, nondistended.  No palpable organomegaly  Extremities: no lower extremity edema  Skin: no rashes, lesions, bruising, or petechiae  Neuro: Alert and oriented x 3. Moves all extremities.    RECENT LABS:    Results from last 7 days   Lab Units 11/01/21  0933   WBC 10*3/mm3 14.66*   HEMOGLOBIN g/dL 10.5*   PLATELETS 10*3/mm3 265     Results from last 7 days   Lab Units 11/01/21  0933   SODIUM mmol/L 133*   POTASSIUM mmol/L 4.1   CO2 mmol/L 18.3*   BUN mg/dL 59*   CREATININE mg/dL 2.21*   GLUCOSE mg/dL 145*     Results from last 7 days   Lab Units  11/01/21  0933   AST (SGOT) U/L 132*   ALT (SGPT) U/L 95*   BILIRUBIN mg/dL 1.4*   ALK PHOS U/L 592*         XR Chest 1 View    Result Date: 10/26/2021  Interval placement right chest wall Port-A-Cath terminating within the mid to distal SVC. No postprocedure pneumothorax.  D: 10/26/2021 E: 10/26/2021  This report was finalized on 10/26/2021 4:36 PM by Dr. Jefe Martin.            Assessment   ASSESSMENT & PLAN:  Metastatic esophageal adenocarcinoma  -Recently diagnosed with known metastatic disease to the liver  -PD-L1 positive, HER-2/lisa negative  -Scheduled to start FOLFOX today however given his failure to thrive and LOTTIE he is not appropriate for treatment at this time  -Had goals of care discussion with patient today and he would like to try and pursue aggressive systemic therapy if possible.  Did discuss that should he not have any clinical improvement, he would not be a candidate for systemic therapy in the future  -Advised patient to try to increase his oral intake as well as work with PT/OT while inpatient to try and improve his clinical status  -Palliative consulted for further goals of care discussion and symptom management  -Discussed his updated case with his friend Angeli Valenzuelaerday evening     Failure to thrive  -Secondary to his malignancy  -States he feels better today  -Management as above     LOTTIE on CKD  -Creatinine up to 2.1 from 1.3 today  -Likely secondary to him not eating and drinking as well  -Labs pending today     Elevated LFTs and hyperbilirubinemia  -Likely secondary to his known metastatic disease     Leukocytosis   -Unclear etiology  -Started on dexamethasone yesterday.  -Consider infectious work-up should patient become febrile  -Labs pending today     Thank you for the consult.  Please do not hesitate to contact with any questions or concerns.  We will continue to follow inpatient    Sudhakar Ernandez MD  Hematology and Oncology    11/2/2021  09:25 EDT

## 2021-11-02 NOTE — PLAN OF CARE
Goal Outcome Evaluation:  Plan of Care Reviewed With: patient           Outcome Summary: PT eval complete. Pt. performed sit to stand transfer w/contact guard assist. He ambulated 200' w/no assistive device, contact guard assist. Gait limited by fatigue, generalized weakness. Recommend home with assist and home health PT upon discharge.

## 2021-11-02 NOTE — THERAPY DISCHARGE NOTE
Acute Care - Occupational Therapy Discharge  [facility]    Patient Name: Andry Potter  : 1955    MRN: [id number]                              Today's Date: 2021       Admit Date: 2021    Visit Dx: No diagnosis found.  Patient Active Problem List   Diagnosis   • Abnormal stress test   • Type 2 diabetes mellitus (HCC)   • Essential hypertension   • Dyslipidemia   • CAD (coronary artery disease)   • Obesity (BMI 30-39.9)   • Snoring   • Moderate obstructive sleep apnea   • Acute ischemic right MCA stroke, status post TPA and right thrombectomy    • CKD (chronic kidney disease) stage 2, GFR 60-89 ml/min   • Acute ischemic right middle cerebral artery (MCA) stroke (HCC)   • Moderate malnutrition (CMS/HCC)   • Abnormal CT of the abdomen   • Esophageal mass   • Esophageal adenocarcinoma (HCC)   • Acute renal failure (ARF) (HCC)     Past Medical History:   Diagnosis Date   • BPH (benign prostatic hyperplasia)    • Diabetes mellitus (HCC)    • Hyperlipidemia    • Hypertension    • Stroke (HCC)      Past Surgical History:   Procedure Laterality Date   • CARDIAC CATHETERIZATION N/A 2020    Procedure: Left Heart Cath 30219;  Surgeon: Bang Walker MD;  Location:  [facility];  Service: Cardiology;  Laterality: N/A;   • COLONOSCOPY     • ENDOSCOPY N/A 10/6/2021    Procedure: ESOPHAGOGASTRODUODENOSCOPY;  Surgeon: Obed Galvez MD;  Location:  [facility];  Service: Gastroenterology;  Laterality: N/A;   • HAND SURGERY     • HIP BIPOLAR REPLACEMENT     • INTERVENTIONAL RADIOLOGY PROCEDURE N/A 2021    Procedure: IR MECHANICAL THROMBECTOMY - PRIMARY;  Surgeon: Dru Gomes MD;  Location:  [facility];  Service: Interventional Radiology;  Laterality: N/A;      General Information     Row Name 21 0949          OT Time and Intention    Document Type discharge evaluation/summary  -CS     Mode of Treatment occupational therapy  -CS     Row Name 21  0949          General Information    Patient Profile Reviewed yes  -CS     Prior Level of Function independent:; bed mobility; transfer; all household mobility; community mobility; ADL's; driving  sister/friends able to assist as needed, moved up to main floor - no longer uses stairs to basement, reports continued independence w/ mobility and ADLs w/ no DME/AE  -CS     Existing Precautions/Restrictions fall; other (see comments)  R subclavian port, monitor BP  -CS     Barriers to Rehab medically complex  -CS     Row Name 11/02/21 0949          Living Environment    Lives With friend(s)  -CS     Row Name 11/02/21 0949          Home Main Entrance    Number of Stairs, Main Entrance two  -CS     Stair Railings, Main Entrance railing on right side (ascending)  -CS     Row Name 11/02/21 0949          Stairs Within Home, Primary    Stairs, Within Home, Primary Pt now lives on main floor (formerly basement), owns SPC/RW however reports does not use, bathroom: walk-in shower and raised toilet seat  -CS     Number of Stairs, Within Home, Primary other (see comments)  -CS     Row Name 11/02/21 0949          Cognition    Orientation Status (Cognition) oriented x 3  -CS     Row Name 11/02/21 0949          Safety Issues, Functional Mobility    Safety Issues Affecting Function (Mobility) insight into deficits/self-awareness; safety precaution awareness; safety precautions follow-through/compliance  -CS     Impairments Affecting Function (Mobility) balance; endurance/activity tolerance; strength  -CS           User Key  (r) = Recorded By, (t) = Taken By, (c) = Cosigned By    Initials Name Provider Type    CS Jeancarlos Hemphill OT Occupational Therapist               Mobility/ADL's     Row Name 11/02/21 0953          Bed Mobility    Comment (Bed Mobility) UIC upon arrival  -CS     Row Name 11/02/21 0953          Transfers    Transfers sit-stand transfer; toilet transfer  -CS     Comment (Transfers) STS x 2 from recliner, cues for  grab bar use during toilet transfer, no AD or LOB  -CS     Sit-Stand [place] (Transfers) supervision  -CS     [place] Level (Toilet Transfer) supervision  -CS     Assistive Device (Toilet Transfer) grab bars/safety frame  -CS     Row Name 11/02/21 0953          Toilet Transfer    Type (Toilet Transfer) sit-stand; stand-sit  -     Row Name 11/02/21 0953          Functional Mobility    Functional Mobility- Comment Supervision for in-room distance to bathroom  -     Row Name 11/02/21 0953          Activities of Daily Living    BADL Assessment/Intervention lower body dressing; grooming; toileting  -CS     Row Name 11/02/21 0953          Lower Body Dressing Assessment/Training    [place] Level (Lower Body Dressing) don; socks; pants/bottoms; standby assist  -CS     Position (Lower Body Dressing) unsupported sitting; unsupported standing  -CS     Row Name 11/02/21 0953          Grooming Assessment/Training    [place] Level (Grooming) wash face, hands; supervision  -CS     Position (Grooming) sink side; unsupported standing  -CS     Row Name 11/02/21 0953          Toileting Assessment/Training    [place] Level (Toileting) adjust/manage clothing; perform perineal hygiene; independent  -CS     Position (Toileting) unsupported standing  -CS           User Key  (r) = Recorded By, (t) = Taken By, (c) = Cosigned By    Initials Name Provider Type    CS Jeancarlos Hemphill OT Occupational Therapist               Obj/Interventions     Row Name 11/02/21 0955          Sensory Assessment (Somatosensory)    Sensory Assessment (Somatosensory) UE sensation intact  -     Row Name 11/02/21 0955          Vision Assessment/Intervention    Visual Impairment/Limitations corrective lenses for reading  -     Row Name 11/02/21 0955          Range of Motion Comprehensive    General Range of Motion bilateral upper extremity ROM WFL  -CS     Row Name 11/02/21 0955          Strength Comprehensive (MMT)    General Manual  Muscle Testing (MMT) Assessment upper extremity strength deficits identified  -CS     Comment, General Manual Muscle Testing (MMT) Assessment BUE grossly  -     Row Name 11/02/21 0955          Motor Skills    Motor Skills coordination; functional endurance  -     Coordination bilateral; upper extremity; bimanual skills; WFL  -     Functional Endurance reports general fatigue, O2 sats WNL on RA, decrease in BP following standing dressing task  -     Row Name 11/02/21 0955          Balance    Balance Assessment sitting static balance; sitting dynamic balance; standing static balance; standing dynamic balance  -     Static Sitting Balance WNL; sitting, edge of bed  -CS     Dynamic Sitting Balance WFL; sitting, edge of bed  -CS     Static Standing Balance WFL; unsupported; standing  -     Dynamic Standing Balance mild impairment; unsupported; standing  -CS     Balance Interventions sitting; sit to stand; standing; occupation based/functional task; other (see comments)  -CS     Comment, Balance Pt maintained all balance(s) during ADL completion mild instability observed during dynamic sit to stand task (donning pants - SBA)  -           User Key  (r) = Recorded By, (t) = Taken By, (c) = Cosigned By    Initials Name Provider Type    CS Jeancarlos Hemphill OT Occupational Therapist               Goals/Plan    No documentation.                Clinical Impression     Row Name 11/02/21 0958          Pain Assessment    Additional Documentation Pain Scale: Numbers Pre/Post-Treatment (Group)  -     Row Name 11/02/21 0958          Pain Scale: Numbers Pre/Post-Treatment    Pretreatment Pain Rating 0/10 - no pain  -CS     Posttreatment Pain Rating 0/10 - no pain  -CS     Pre/Posttreatment Pain Comment tolerated eval  -     Pain Intervention(s) Repositioned; Ambulation/increased activity; Rest  -     Row Name 11/02/21 0958          Plan of Care Review    Plan of Care Reviewed With patient  -     Progress no  change  OT evaluation completed  -CS     Outcome Summary Initial OT evaluation completed. Pt presents w/ generalized deconditioning, however at baseline for ADL performance. Pt supervision (IV mngt) for transfers, in-room mobility, toileting, and sinkside grooming - SBA for dynamic sit-stand while donning pants. OT signing off, defer to PT for further mobility needs. Recommend d/c to home w/ assist prn.  -CS     Row Name 11/02/21 0958          Therapy Assessment/Plan (OT)    Criteria for Skilled Therapeutic Interventions Met (OT) no; does not meet criteria for skilled intervention  -CS     Therapy Frequency (OT) evaluation only  -CS     Row Name 11/02/21 0958          Therapy Plan Review/Discharge Plan (OT)    Anticipated Discharge Disposition (OT) home; home with assist  -CS     Row Name 11/02/21 0958          Vital Signs    Pre Systolic BP Rehab 101  RN cleared for eval, VSS on RA  -CS     Pre Treatment Diastolic BP 60  -CS     Posttreatment Heart Rate (beats/min) 80  -CS     O2 Delivery Pre Treatment room air  -CS     O2 Delivery Intra Treatment room air  -CS     Post SpO2 (%) 98  -CS     O2 Delivery Post Treatment room air  -CS     Pre Patient Position Sitting  -CS     Intra Patient Position Standing  -CS     Post Patient Position Standing  -CS     Row Name 11/02/21 0958          Positioning and Restraints    Pre-Treatment Position sitting in chair/recliner  -CS     Post Treatment Position bathroom  -CS     Bathroom with PT  sinkside  -CS           User Key  (r) = Recorded By, (t) = Taken By, (c) = Cosigned By    Initials Name Provider Type    CS Jeancarlos Hemphill, OT Occupational Therapist               Outcome Measures     Row Name 11/02/21 1007          How much help from another is currently needed...    Putting on and taking off regular lower body clothing? 3  -CS     Bathing (including washing, rinsing, and drying) 3  -CS     Toileting (which includes using toilet bed pan or urinal) 4  -CS     Putting on  and taking off regular upper body clothing 4  -CS     Taking care of personal grooming (such as brushing teeth) 4  -CS     Eating meals 4  -CS     AM-PAC 6 Clicks Score (OT) 22  -CS     Row Name 11/02/21 0800          How much help from another person do you currently need...    Turning from your back to your side while in flat bed without using bedrails? 4  -LC     Moving from lying on back to sitting on the side of a flat bed without bedrails? 4  -LC     Moving to and from a bed to a chair (including a wheelchair)? 3  -LC     Standing up from a chair using your arms (e.g., wheelchair, bedside chair)? 3  -LC     Climbing 3-5 steps with a railing? 3  -LC     To walk in hospital room? 3  -LC     AM-PAC 6 Clicks Score (PT) 20  -LC     Row Name 11/02/21 1007          Functional Assessment    Outcome Measure Options AM-PAC 6 Clicks Daily Activity (OT)  -           User Key  (r) = Recorded By, (t) = Taken By, (c) = Cosigned By    Initials Name Provider Type    Cristina Vogel RN Registered Nurse     Jeancarlos Hemphill OT Occupational Therapist              Occupational Therapy Education                 Title: PT OT SLP Therapies (Done)     Topic: Occupational Therapy (Done)     Point: Precautions (Done)     Description:   Instruct learner(s) on prescribed precautions during self-care and functional transfers.              Learning Progress Summary           Patient Acceptance, E,D, DU by  at 11/2/2021 1007    Comment: OT role, in-room safety awareness                               User Key     Initials Effective Dates Name Provider Type Discipline     06/16/21 -  Jeancarlos Hemphill OT Occupational Therapist OT              OT Recommendation and Plan  Retired Outcome Summary/Treatment Plan (OT)  Anticipated Discharge Disposition (OT): home, home with assist  Therapy Frequency (OT): evaluation only  Plan of Care Review  Plan of Care Reviewed With: patient  Progress: no change (OT evaluation completed)  Outcome  Summary: Initial OT evaluation completed. Pt presents w/ generalized deconditioning, however at baseline for ADL performance. Pt supervision (IV mngt) for transfers, in-room mobility, toileting, and sinkside grooming - SBA for dynamic sit-stand while donning pants. OT signing off, defer to PT for further mobility needs. Recommend d/c to home w/ assist prn.  Plan of Care Reviewed With: patient  Outcome Summary: Initial OT evaluation completed. Pt presents w/ generalized deconditioning, however at baseline for ADL performance. Pt supervision (IV mngt) for transfers, in-room mobility, toileting, and sinkside grooming - SBA for dynamic sit-stand while donning pants. OT signing off, defer to PT for further mobility needs. Recommend d/c to home w/ assist prn.     Time Calculation:    Time Calculation- OT     Row Name 11/02/21 1008             Time Calculation- OT    OT Start Time 0921  -CS      OT Received On 11/02/21  -CS              Untimed Charges    OT Eval/Re-eval Minutes 44  -CS              Total Minutes    Untimed Charges Total Minutes 44  -CS       Total Minutes 44  -CS            User Key  (r) = Recorded By, (t) = Taken By, (c) = Cosigned By    Initials Name Provider Type    CS Jeancarlos Hemphill, OT Occupational Therapist              Therapy Charges for Today     Code Description Service Date Service Provider Modifiers Qty    [id number]  OT EVAL LOW COMPLEXITY 3 11/2/2021 Jeancarlos Hemphill OT GO 1               Jeancarlos Hemphill OT  11/2/2021

## 2021-11-03 ENCOUNTER — READMISSION MANAGEMENT (OUTPATIENT)
Dept: CALL CENTER | Facility: HOSPITAL | Age: 66
End: 2021-11-03

## 2021-11-03 VITALS
BODY MASS INDEX: 26.05 KG/M2 | HEIGHT: 70 IN | OXYGEN SATURATION: 100 % | WEIGHT: 182 LBS | TEMPERATURE: 97.5 F | RESPIRATION RATE: 16 BRPM | HEART RATE: 90 BPM | SYSTOLIC BLOOD PRESSURE: 134 MMHG | DIASTOLIC BLOOD PRESSURE: 86 MMHG

## 2021-11-03 DIAGNOSIS — C15.9 ESOPHAGEAL ADENOCARCINOMA (HCC): Primary | ICD-10-CM

## 2021-11-03 LAB
ANION GAP SERPL CALCULATED.3IONS-SCNC: 16 MMOL/L (ref 5–15)
BUN SERPL-MCNC: 52 MG/DL (ref 8–23)
BUN/CREAT SERPL: 30.6 (ref 7–25)
CALCIUM SPEC-SCNC: 9.2 MG/DL (ref 8.6–10.5)
CHLORIDE SERPL-SCNC: 101 MMOL/L (ref 98–107)
CO2 SERPL-SCNC: 18 MMOL/L (ref 22–29)
CREAT SERPL-MCNC: 1.7 MG/DL (ref 0.76–1.27)
GFR SERPL CREATININE-BSD FRML MDRD: 40 ML/MIN/1.73
GLUCOSE BLDC GLUCOMTR-MCNC: 112 MG/DL (ref 70–130)
GLUCOSE BLDC GLUCOMTR-MCNC: 138 MG/DL (ref 70–130)
GLUCOSE SERPL-MCNC: 118 MG/DL (ref 65–99)
POTASSIUM SERPL-SCNC: 4.6 MMOL/L (ref 3.5–5.2)
SODIUM SERPL-SCNC: 135 MMOL/L (ref 136–145)

## 2021-11-03 PROCEDURE — 82962 GLUCOSE BLOOD TEST: CPT

## 2021-11-03 PROCEDURE — 25010000002 HEPARIN (PORCINE) PER 1000 UNITS: Performed by: INTERNAL MEDICINE

## 2021-11-03 PROCEDURE — 63710000001 DEXAMETHASONE PER 0.25 MG: Performed by: NURSE PRACTITIONER

## 2021-11-03 PROCEDURE — 25010000002 HEPARIN LOCK FLUSH PER 10 UNITS: Performed by: INTERNAL MEDICINE

## 2021-11-03 PROCEDURE — 99232 SBSQ HOSP IP/OBS MODERATE 35: CPT | Performed by: INTERNAL MEDICINE

## 2021-11-03 PROCEDURE — 99239 HOSP IP/OBS DSCHRG MGMT >30: CPT | Performed by: INTERNAL MEDICINE

## 2021-11-03 PROCEDURE — 80048 BASIC METABOLIC PNL TOTAL CA: CPT | Performed by: INTERNAL MEDICINE

## 2021-11-03 RX ORDER — BISACODYL 5 MG/1
5 TABLET, DELAYED RELEASE ORAL DAILY PRN
Status: DISCONTINUED | OUTPATIENT
Start: 2021-11-03 | End: 2021-11-03 | Stop reason: HOSPADM

## 2021-11-03 RX ORDER — POLYETHYLENE GLYCOL 3350 17 G/17G
17 POWDER, FOR SOLUTION ORAL DAILY PRN
Status: DISCONTINUED | OUTPATIENT
Start: 2021-11-03 | End: 2021-11-03 | Stop reason: HOSPADM

## 2021-11-03 RX ORDER — AMOXICILLIN 250 MG
2 CAPSULE ORAL 2 TIMES DAILY
Status: DISCONTINUED | OUTPATIENT
Start: 2021-11-03 | End: 2021-11-03 | Stop reason: HOSPADM

## 2021-11-03 RX ORDER — HEPARIN SODIUM (PORCINE) LOCK FLUSH IV SOLN 100 UNIT/ML 100 UNIT/ML
500 SOLUTION INTRAVENOUS ONCE
Status: COMPLETED | OUTPATIENT
Start: 2021-11-03 | End: 2021-11-03

## 2021-11-03 RX ORDER — DEXAMETHASONE 4 MG/1
4 TABLET ORAL
Qty: 30 TABLET | Refills: 0 | Status: SHIPPED | OUTPATIENT
Start: 2021-11-04

## 2021-11-03 RX ORDER — BISACODYL 10 MG
10 SUPPOSITORY, RECTAL RECTAL DAILY PRN
Status: DISCONTINUED | OUTPATIENT
Start: 2021-11-03 | End: 2021-11-03 | Stop reason: HOSPADM

## 2021-11-03 RX ADMIN — DICLOFENAC 2 G: 10 GEL TOPICAL at 11:58

## 2021-11-03 RX ADMIN — NYSTATIN 500000 UNITS: 100000 SUSPENSION ORAL at 08:35

## 2021-11-03 RX ADMIN — METOCLOPRAMIDE 5 MG: 10 TABLET ORAL at 11:55

## 2021-11-03 RX ADMIN — DICLOFENAC 2 G: 10 GEL TOPICAL at 08:35

## 2021-11-03 RX ADMIN — HEPARIN SODIUM 5000 UNITS: 5000 INJECTION, SOLUTION INTRAVENOUS; SUBCUTANEOUS at 05:49

## 2021-11-03 RX ADMIN — ASPIRIN 81 MG: 81 TABLET, COATED ORAL at 08:35

## 2021-11-03 RX ADMIN — HEPARIN 500 UNITS: 100 SYRINGE at 12:56

## 2021-11-03 RX ADMIN — NYSTATIN 500000 UNITS: 100000 SUSPENSION ORAL at 11:55

## 2021-11-03 RX ADMIN — METOCLOPRAMIDE 5 MG: 10 TABLET ORAL at 08:35

## 2021-11-03 RX ADMIN — SODIUM CHLORIDE 75 ML/HR: 9 INJECTION, SOLUTION INTRAVENOUS at 05:46

## 2021-11-03 RX ADMIN — SODIUM CHLORIDE, PRESERVATIVE FREE 10 ML: 5 INJECTION INTRAVENOUS at 08:36

## 2021-11-03 RX ADMIN — PANTOPRAZOLE SODIUM 40 MG: 40 TABLET, DELAYED RELEASE ORAL at 05:49

## 2021-11-03 RX ADMIN — DOCUSATE SODIUM 50MG AND SENNOSIDES 8.6MG 2 TABLET: 8.6; 5 TABLET, FILM COATED ORAL at 11:55

## 2021-11-03 RX ADMIN — DEXAMETHASONE 4 MG: 4 TABLET ORAL at 08:35

## 2021-11-03 NOTE — PROGRESS NOTES
HEMATOLOGY/ONCOLOGY PROGRESS NOTE    Subjective      CC: Metastatic esophageal adenocarcinoma    SUBJECTIVE:   No acute events overnight.  Patient states that he feels better today.  Working with PT/OT and ambulating the halls with nurses.  States that he ate about half his breakfast this morning tolerated it well.  Denies any nausea, vomiting, diarrhea        Past Medical History, Past Surgical History, Social History, Family History have been reviewed and are without significant changes except as mentioned.      Medications:  The current medication list was reviewed in the EMR    ALLERGIES: No Known Allergies    ROS:  A comprehensive 10 point review of systems was performed and was negative except as mentioned.      Objective      Vitals:    11/02/21 1800 11/02/21 1900 11/02/21 2249 11/03/21 0334   BP:  130/74 135/90 126/85   BP Location:  Left arm Left arm Left arm   Patient Position:  Lying Lying Lying   Pulse: 73 72 90 80   Resp:  18 16 16   Temp:  98 °F (36.7 °C) 98.1 °F (36.7 °C) 97.9 °F (36.6 °C)   TempSrc:  Oral Oral Oral   SpO2:   94% 95%   Weight:       Height:         General: Sitting in chair comfortably, in no acute distress  HEENT: sclerae anicteric, oropharynx clear  Lymphatics: no cervical, supraclavicular, inguinal, or axillary adenopathy  Cardiovascular: regular rate and rhythm, no murmurs  Lungs: clear to auscultation bilaterally  Abdomen: soft, nontender, nondistended.  No palpable organomegaly  Extremities: no lower extremity edema  Skin: no rashes, lesions, bruising, or petechiae  Neuro: Alert and oriented x 3. Moves all extremities.    RECENT LABS:    Results from last 7 days   Lab Units 11/02/21  0957 11/01/21  0933   WBC 10*3/mm3 15.32* 14.66*   HEMOGLOBIN g/dL 10.6* 10.5*   PLATELETS 10*3/mm3 295 265     Results from last 7 days   Lab Units 11/03/21  0831 11/02/21  0957 11/01/21  0933   SODIUM mmol/L 135* 136 133*   POTASSIUM mmol/L 4.6 4.4 4.1   CO2 mmol/L 18.0* 16.0* 18.3*   BUN mg/dL  52* 55* 59*   CREATININE mg/dL 1.70* 1.97* 2.21*   GLUCOSE mg/dL 118* 182* 145*     Results from last 7 days   Lab Units 11/02/21  0957 11/01/21  0933   AST (SGOT) U/L 156* 132*   ALT (SGPT) U/L 124* 95*   BILIRUBIN mg/dL 1.6* 1.4*   ALK PHOS U/L 718* 592*         No radiology results for the last 7 days        Assessment   ASSESSMENT & PLAN:  Metastatic esophageal adenocarcinoma  -Recently diagnosed with known metastatic disease to the liver  -PD-L1 positive, HER-2/lisa negative  -Scheduled to start FOLFOX on 11/1/2021 however given his failure to thrive and LOTTIE he is not appropriate for treatment at this time  -Advised patient to try to increase his oral intake as well as work with PT/OT while inpatient to try and improve his clinical status  -Had further goals of care discussion with patient and his friend Angeli today.  Patient states that he feels better today would like to try to resume chemotherapy.  Clinically he appears appropriate and his labs are improving.  We will attempt to restart chemotherapy next Tuesday (11/9/2021) with follow-up with me on 11/11/2021.  Should he clinically fell at home or his labs not significantly improve by Tuesday, would likely focus more on palliative care options     Failure to thrive  -Secondary to his malignancy  -States he feels better today  -Management as above     LOTTIE on CKD  -Creatinine on presentation 2.1  -Improved today to 1.7 with IV fluids  -Likely secondary to him not eating and drinking as well  -Labs pending today     Elevated LFTs and hyperbilirubinemia  -Likely secondary to his known metastatic disease     Leukocytosis   -Unclear etiology  -Started on dexamethasone on 11/1/2021.  -Consider infectious work-up should patient become febrile  -Labs pending today     Thank you for the consult.  Please do not hesitate to contact with any questions or concerns.  We will continue to follow inpatient    Sudhakar Ernandez MD  Hematology and Oncology    11/3/2021  10:02 EDT

## 2021-11-03 NOTE — DISCHARGE SUMMARY
[facility] Medicine Services  DISCHARGE SUMMARY    Patient Name: Andry Potter  : 1955  MRN: [id number]    Date of Admission: 2021 12:53 PM  Date of Discharge: 11/3/2021  Primary Care Physician: Meghan Watt APRN    Consults     Date and Time Order Name Status Description    2021  1:47 PM Inpatient Hematology & Oncology Consult Completed     2021  1:47 PM Inpatient Palliative Care MD Consult Completed     10/4/2021 10:25 PM Inpatient Gastroenterology Consult Completed           Hospital Course     Presenting Problem:   ARF (acute renal failure) (HCC) [N17.9]  Acute renal failure (ARF) (HCC) [N17.9]    Active Hospital Problems    Diagnosis  POA   • Acute renal failure (ARF) (HCC) [N17.9]  Yes   • Esophageal adenocarcinoma (HCC) [C15.9]  Yes   • Acute ischemic right MCA stroke, status post TPA and right thrombectomy  [I63.511]  Yes   • Essential hypertension [I10]  Yes   • CAD (coronary artery disease) [I25.10]  Yes   • Obesity (BMI 30-39.9) [E66.9]  Yes      Resolved Hospital Problems   No resolved problems to display.          Hospital Course:  Andry Potter is a 66 y.o. male with recent diagnosis of esophageal adenocarcinoma, previous CVA, obesity, HTN, CAD, T2DM who presents as a direct admission at the prompting of his oncologist, Dr Ernandez, due to abnl lab work. Patient was due to start chemotherapy for his esophageal cancer today, however on labs he was noted to have new abnls noting elevated bili 1.4, ALT 95, , WBC 14k, Cr 2.2. Patient was advised for direct admission for further treatment/workup.  Creatinine was elevated on presentation.  Suspect secondary to dehydration given poor p.o. intake.  Improved with IV fluids and he is taking adequate fluids p.o.  Still remains above baseline although the patient has some element of CKD as his creatinine has been up to 2 in the past.  Discharge though trending the right direction.  Patient wishes to go  home and recuperate.  I said that I was okay with this as long as he continued to dehydrated at home.  Patient will follow up with Dr. Ernandez on Tuesday of next week.  Counseled patient to avoid any NSAIDs.  Patient is not on any medications that could contribute to LOTTIE.  Discussed plan of care with caregiver.    Noted care was also consulted during admission.  Recommended dexamethasone to help with liver pain and appetite.  They will follow as an outpatient.      Discharge Follow Up Recommendations for outpatient labs/diagnostics:  Follow-up with Dr. Ernandez on 11/11/2021.    Day of Discharge     HPI:   Patient is doing well this morning.  Ambulating with physical therapy.  Reports that he feels like he wants to go home.  Having good urine output.    Review of Systems  General: denies fevers or chills  CV: denies chest pain  Resp: denies shortness of breath  Abd: denies abd pain, nausea      Vital Signs:   Temp:  [97.5 °F (36.4 °C)-98.1 °F (36.7 °C)] 97.5 °F (36.4 °C)  Heart Rate:  [72-96] 90  Resp:  [16-18] 16  BP: (126-152)/(74-90) 134/86     Physical Exam:  Constitutional: No acute distress, awake, alert, pleasant, chronically ill gentleman  Respiratory: Clear to auscultation bilaterally, respiratory effort normal   Cardiovascular: RRR, no murmurs, rubs, or gallops  Gastrointestinal: Positive bowel sounds, soft, nontender, nondistended  Musculoskeletal: No bilateral ankle edema  Psychiatric: Appropriate affect, cooperative  Neurologic: Oriented x 3, no focal neurological deficits  Skin: No rashes      Pertinent  and/or Most Recent Results     LAB RESULTS:      Lab 11/02/21  0957 11/01/21  0933   WBC 15.32* 14.66*   HEMOGLOBIN 10.6* 10.5*   HEMATOCRIT 33.2* 31.8*   PLATELETS 295 265   NEUTROS ABS 10.19* 10.26*   IMMATURE GRANS (ABS) 0.08*  --    LYMPHS ABS 4.06*  --    MONOS ABS 0.97*  --    EOS ABS 0.01  --    MCV 94.3 91.4         Lab 11/03/21  0831 11/02/21  0957 11/01/21  0933   SODIUM 135* 136 133*    POTASSIUM 4.6 4.4 4.1   CHLORIDE 101 100 98   CO2 18.0* 16.0* 18.3*   ANION GAP 16.0* 20.0* 16.7*   BUN 52* 55* 59*   CREATININE 1.70* 1.97* 2.21*   GLUCOSE 118* 182* 145*   CALCIUM 9.2 9.4 9.8         Lab 11/02/21  0957 11/01/21  0933   TOTAL PROTEIN 6.3 6.7   ALBUMIN 3.20* 3.00*   GLOBULIN 3.1 3.7   ALT (SGPT) 124* 95*   AST (SGOT) 156* 132*   BILIRUBIN 1.6* 1.4*   ALK PHOS 718* 592*                     Brief Urine Lab Results  (Last result in the past 365 days)      Color   Clarity   Blood   Leuk Est   Nitrite   Protein   CREAT   Urine HCG        11/01/21 1523             96.5             Microbiology Results (last 10 days)     Procedure Component Value - Date/Time    COVID PRE-OP / PRE-PROCEDURE SCREENING ORDER (NO ISOLATION) - Swab, Nasopharynx [[id number]]  (Normal) Collected: 11/01/21 1439    Lab Status: Final result Specimen: Swab from Nasopharynx Updated: 11/01/21 1707    Narrative:      The following orders were created for panel order COVID PRE-OP / PRE-PROCEDURE SCREENING ORDER (NO ISOLATION) - Swab, Nasopharynx.  Procedure                               Abnormality         Status                     ---------                               -----------         ------                     COVID-19, ABBOTT IN-HOUS...[[id number]]  Normal              Final result                 Please view results for these tests on the individual orders.    COVID-19, ABBOTT IN-HOUSE,NASAL Swab (NO TRANSPORT MEDIA) 2 HR TAT - Swab, Nasopharynx [[id number]]  (Normal) Collected: 11/01/21 1439    Lab Status: Final result Specimen: Swab from Nasopharynx Updated: 11/01/21 1707     COVID19 Presumptive Negative    Narrative:      Fact sheet for providers: https://www.fda.gov/media/050045/download     Fact sheet for patients: https://www.fda.gov/media/355859/download    Test performed by PCR.  If inconsistent with clinical signs and symptoms patient should be tested with different authorized molecular test.          XR Chest 1  View    Result Date: 10/26/2021  EXAMINATION: XR CHEST 1 VW- 10/26/2021  INDICATION: C15.9-Malignant neoplasm of esophagus, unspecified  COMPARISON: Chest x-ray 10/04/2021  FINDINGS: Right chest wall Port-A-Cath in place. Cardiac size within normal limits. Increased perihilar lung markings of central vascular congestion without overt edema.        Interval placement right chest wall Port-A-Cath terminating within the mid to distal SVC. No postprocedure pneumothorax.  D: 10/26/2021 E: 10/26/2021  This report was finalized on 10/26/2021 4:36 PM by Dr. Jefe Martin.                    Plan for Follow-up of Pending Labs/Results:     Discharge Details        Discharge Medications      New Medications      Instructions Start Date   dexamethasone 4 MG tablet  Commonly known as: DECADRON   4 mg, Oral, Daily With Breakfast   Start Date: November 4, 2021        Continue These Medications      Instructions Start Date   Accu-Chek Guide Me w/Device kit   No dose, route, or frequency recorded.      aspirin 81 MG EC tablet   81 mg, Oral, Daily      glucose blood test strip   Use to monitor glucose daily as instructed      glucose monitor monitoring kit   Use to monitor glucose daily      lidocaine-prilocaine 2.5-2.5 % cream  Commonly known as: EMLA   1 application, Topical, As Needed      metoclopramide 5 MG tablet  Commonly known as: REGLAN   5 mg, Oral, 4 Times Daily Before Meals & Nightly      mirtazapine 30 MG tablet  Commonly known as: REMERON   30 mg, Oral, Nightly      nystatin 100,000 unit/mL suspension  Commonly known as: MYCOSTATIN   500,000 Units, Swish & Swallow, 4 Times Daily      ondansetron ODT 8 MG disintegrating tablet  Commonly known as: ZOFRAN-ODT   8 mg, Translingual, Every 8 Hours PRN      onetouch ultrasoft lancets   Use to monitor glucose once daily      pantoprazole 40 MG EC tablet  Commonly known as: PROTONIX   40 mg, Oral, Every Early Morning      rosuvastatin 40 MG tablet  Commonly known as: Crestor   40  mg, Oral, Daily      zolpidem 5 MG tablet  Commonly known as: AMBIEN   5 mg, Oral, Nightly PRN             No Known Allergies      Discharge Disposition:  Home or Self Care    Diet:  Hospital:  Diet Order   Procedures   • Diet Regular       Activity:  Activity Instructions     Activity as Tolerated            Restrictions or Other Recommendations:         CODE STATUS:    Code Status and Medical Interventions:   Ordered at: 11/02/21 0738     Medical Intervention Limits:    NO intubation (DNI)    NO vasopressors    NO cardioversion     Level Of Support Discussed With:    Patient     Code Status (Patient has no pulse and is not breathing):    No CPR (Do Not Attempt to Resuscitate)     Medical Interventions (Patient has pulse or is breathing):    Limited Support       Future Appointments   Date Time Provider Department Center   11/9/2021  8:30 AM CHAIR 3 - [facility]   11/9/2021 11:30 AM Meghan Watt APRN [facility]   11/11/2021  2:15 PM Sudhakar Ernandez MD [facility]   11/11/2021  2:30 PM CHAIR 5 - [facility]   11/22/2021  9:30 AM CHAIR 9 - [facility]   11/24/2021  3:00 PM CHAIR 1 - [facility]   12/7/2021  8:30 AM CHAIR 5 - [facility]   12/9/2021  2:30 PM CHAIR 4 - [facility]   1/4/2022  1:30 PM Kerri Alcantar LCSW [facility]                 Matilde Macedo MD  11/03/21      Time Spent on Discharge:  I spent  35  minutes on this discharge activity which included: face-to-face encounter with the patient, reviewing the data in the system, coordination of the care with the nursing staff as well as consultants, documentation, and entering orders.

## 2021-11-03 NOTE — OUTREACH NOTE
Prep Survey      Responses   [facility], [place], operated by [facility] patient discharged from? [place]   Is LACE score < 7 ? No   Emergency Room discharge w/ pulse ox? No   Eligibility [facility]   Date of Admission 11/01/21   Date of Discharge 11/03/21   Discharge Disposition Home or Self Care   Discharge diagnosis acute renal failure, esophageal cancer   Does the patient have one of the following disease processes/diagnoses(primary or secondary)? Other   Does the patient have Home health ordered? No   Is there a DME ordered? No   Comments regarding appointments 11/9  1130 APRN [facility]   Prep survey completed? Yes          Renate Hutchins, RN

## 2021-11-03 NOTE — CASE MANAGEMENT/SOCIAL WORK
Case Management Discharge Note      Final Note: Plan is home. Friend will transport. Patient denies any discharge needs.         Selected Continued Care - Admitted Since 11/1/2021     Destination    No services have been selected for the patient.              Durable Medical Equipment    No services have been selected for the patient.              Dialysis/Infusion    No services have been selected for the patient.              Home Medical Care    No services have been selected for the patient.              Therapy    No services have been selected for the patient.              Community Resources    No services have been selected for the patient.              Community & DME    No services have been selected for the patient.                       Final Discharge Disposition Code: 01 - home or self-care

## 2021-11-03 NOTE — CASE MANAGEMENT/SOCIAL WORK
Discharge Planning Assessment  [facility]     Patient Name: Andry Potter  MRN: [id number]  Today's Date: 11/3/2021    Admit Date: 11/1/2021     Discharge Needs Assessment     Row Name 11/03/21 0840       Living Environment    Lives With alone    Current Living Arrangements home/apartment/condo    Primary Care Provided by self    Provides Primary Care For no one    Family Caregiver if Needed sibling(s)    Family Caregiver Names Rubina Baldwin (sister) [phone]    Able to Return to Prior Arrangements yes       Resource/Environmental Concerns    Resource/Environmental Concerns none    Transportation Concerns car, none       Transition Planning    Patient/Family Anticipates Transition to home    Patient/Family Anticipated Services at Transition none    Transportation Anticipated family or friend will provide       Discharge Needs Assessment    Readmission Within the Last 30 Days no previous admission in last 30 days    Equipment Currently Used at Home none    Concerns to be Addressed denies needs/concerns at this time    Anticipated Changes Related to Illness none    Equipment Needed After Discharge none               Discharge Plan     Row Name 11/03/21 0841       Plan    Plan Home    Patient/Family in Agreement with Plan yes    Plan Comments Spoke with patient at bedside. Lives alone in [facility]. Contact is Rubina Baldwin (sister) [phone]. Is independent with ADL's. No problems with [employer] Medicare or medications. Uses no DME at home. [facility] will and POA on file. Plan is home. CM will continue to follow.    Final Discharge Disposition Code 01 - home or self-care              Continued Care and Services - Admitted Since 11/1/2021    Coordination has not been started for this encounter.          Demographic Summary     Row Name 11/03/21 0838       General Information    Admission Type inpatient    Arrived From home    Referral Source admission list    Reason for Consult discharge planning    Preferred  Language English     Used During This Interaction no       Contact Information    Permission Granted to Share Info With     Contact Information Obtained for     Contact Information Comments PCP is LIZBETH Ruvalcaba               Functional Status     Row Name 11/03/21 0839       Functional Status    Usual Activity Tolerance good    Current Activity Tolerance moderate       Functional Status, IADL    Medications independent    Meal Preparation independent    Housekeeping assistive person    Laundry assistive person    Shopping assistive person       Mental Status    General Appearance WDL WDL       Mental Status Summary    Recent Changes in Mental Status/Cognitive Functioning no changes       Employment/    Employment Status retired               Psychosocial    No documentation.                Abuse/Neglect    No documentation.                Legal    No documentation.                Substance Abuse    No documentation.                Patient Forms    No documentation.                   Oscar Palafox RN

## 2021-11-04 ENCOUNTER — TRANSITIONAL CARE MANAGEMENT TELEPHONE ENCOUNTER (OUTPATIENT)
Dept: CALL CENTER | Facility: HOSPITAL | Age: 66
End: 2021-11-04

## 2021-11-04 NOTE — OUTREACH NOTE
Call Center TCM Note      Responses   [facility], [place], operated by [facility] patient discharged from? [place]   Does the patient have one of the following disease processes/diagnoses(primary or secondary)? Other   TCM attempt successful? Yes   Call start time 1048   Call end time 1052   Discharge diagnosis acute renal failure, esophageal cancer   Meds reviewed with patient/caregiver? Yes   Is the patient having any side effects they believe may be caused by any medication additions or changes? No   Does the patient have all medications ordered at discharge? No   What is keeping the patient from filling the prescriptions? --  [haven't had the time ,  going later]   Nursing Interventions No intervention needed   Is the patient taking all medications as directed (includes completed medication regime)? Yes   Does the patient have a primary care provider?  Yes   Does the patient have an appointment with their PCP within 7 days of discharge? Greater than 7 days   Comments regarding PCP [facility] on 11/15/21    What is preventing the patient from scheduling follow up appointments within 7 days of discharge? Earlier appointment not available   Nursing Interventions Verified appointment date/time/provider   Psychosocial issues? No   Did the patient receive a copy of their discharge instructions? Yes   Nursing interventions Reviewed instructions with patient   What is the patient's perception of their health status since discharge? Improving   Is the patient/caregiver able to teach back signs and symptoms related to disease process for when to call PCP? Yes   Is the patient/caregiver able to teach back signs and symptoms related to disease process for when to call 911? Yes   Is the patient/caregiver able to teach back the hierarchy of who to call/visit for symptoms/problems? PCP, Specialist, Home health nurse, Urgent Care, ED, 911 Yes   If the patient is a current smoker, are they able to teach back resources for cessation? Not a smoker    TCM call completed? Yes          Kerri Capps RN    11/4/2021, 10:52 EDT

## 2021-11-05 ENCOUNTER — HOSPITAL ENCOUNTER (OUTPATIENT)
Dept: INFUSION THERAPY | Facility: HOSPITAL | Age: 66
Setting detail: INFUSION SERIES
Discharge: HOME | End: 2021-11-05

## 2021-11-05 ENCOUNTER — TELEPHONE (OUTPATIENT)
Dept: ONCOLOGY | Facility: CLINIC | Age: 66
End: 2021-11-05

## 2021-11-05 VITALS
SYSTOLIC BLOOD PRESSURE: 102 MMHG | HEART RATE: 90 BPM | DIASTOLIC BLOOD PRESSURE: 64 MMHG | TEMPERATURE: 98.2 F | OXYGEN SATURATION: 97 % | RESPIRATION RATE: 16 BRPM

## 2021-11-05 DIAGNOSIS — C15.9 ESOPHAGEAL ADENOCARCINOMA (HCC): Primary | ICD-10-CM

## 2021-11-05 PROCEDURE — 25010000002 HEPARIN LOCK FLUSH PER 10 UNITS: Performed by: INTERNAL MEDICINE

## 2021-11-05 PROCEDURE — 96360 HYDRATION IV INFUSION INIT: CPT

## 2021-11-05 RX ORDER — HEPARIN SODIUM (PORCINE) LOCK FLUSH IV SOLN 100 UNIT/ML 100 UNIT/ML
500 SOLUTION INTRAVENOUS AS NEEDED
Status: CANCELLED | OUTPATIENT
Start: 2021-11-05

## 2021-11-05 RX ORDER — HEPARIN SODIUM (PORCINE) LOCK FLUSH IV SOLN 100 UNIT/ML 100 UNIT/ML
500 SOLUTION INTRAVENOUS AS NEEDED
Status: DISCONTINUED | OUTPATIENT
Start: 2021-11-05 | End: 2021-11-07 | Stop reason: HOSPADM

## 2021-11-05 RX ORDER — SODIUM CHLORIDE 0.9 % (FLUSH) 0.9 %
10 SYRINGE (ML) INJECTION AS NEEDED
Status: DISCONTINUED | OUTPATIENT
Start: 2021-11-05 | End: 2021-11-07 | Stop reason: HOSPADM

## 2021-11-05 RX ORDER — SODIUM CHLORIDE 0.9 % (FLUSH) 0.9 %
10 SYRINGE (ML) INJECTION AS NEEDED
Status: CANCELLED | OUTPATIENT
Start: 2021-11-05

## 2021-11-05 RX ADMIN — SODIUM CHLORIDE 1000 ML: 9 INJECTION, SOLUTION INTRAVENOUS at 13:10

## 2021-11-05 RX ADMIN — SODIUM CHLORIDE, PRESERVATIVE FREE 10 ML: 5 INJECTION INTRAVENOUS at 14:24

## 2021-11-05 RX ADMIN — HEPARIN SODIUM (PORCINE) LOCK FLUSH IV SOLN 100 UNIT/ML 500 UNITS: 100 SOLUTION at 14:24

## 2021-11-05 NOTE — TELEPHONE ENCOUNTER
Attempted to call Yolis and it went to voicemail.  Will try again later this morning,.  Patient has not started chemo yet.

## 2021-11-05 NOTE — TELEPHONE ENCOUNTER
Caller: LAYNE FRANCO    Relationship:     Best call back number: [phone]    What is the best time to reach you: ASAP    Who are you requesting to speak with (clinical staff, provider,  specific staff member): NURSE    Do you know the name of the person who called:     What was the call regarding: PT IS DOING POORLY WITH CHEMO    Do you require a callback: YES

## 2021-11-05 NOTE — TELEPHONE ENCOUNTER
Yolis called back and I spoke with her for a length of time about paient and his poor po intake.  She has encouraged him to try and eat many things but he doesn't want any of it. She said he has taken water, a can of ensure and will eat ice cream.  She is concerned about patient doing chemo but states he says he wants to do it.  I spoke to Dr. Ernandez and they have a spot in infusion where they can give ivf today in [place].  I told Yolis that he can get fluids art 1pm today and keep encouraging patient thru the weekend.  She can mix ensure with icecream and try bland foods.  She said the patient also has hiccups.  I instructed that we will reassess at his infusion appt.  And she verbalized understanding.

## 2021-11-11 ENCOUNTER — READMISSION MANAGEMENT (OUTPATIENT)
Dept: CALL CENTER | Facility: HOSPITAL | Age: 66
End: 2021-11-11

## 2021-11-11 NOTE — OUTREACH NOTE
Medical Week 2 Survey      Responses   [facility] patient discharged from? [place]   Does the patient have one of the following disease processes/diagnoses(primary or secondary)? Other   Week 2 attempt successful? Yes   Call start time 1609   Discharge diagnosis acute renal failure, esophageal cancer   Revoke Patient   [Aleta, caregiver, reports patient passed away on 11/10/21 under the care of Hospice. ]          Enma Jenkins RN

## 2022-01-03 NOTE — TELEPHONE ENCOUNTER
Called number in patient record. Aleta, caregiver, answered . Just checked on patient.      By Sherri Rivera, RN

## 2023-02-06 NOTE — ED NOTES
Pt awake, alert, sent over by PCP for yearly check up when he was discovered to be hypotensive. Pt denies pain at this time, even and unlabored RR  and has no complaints. He was taken off his BP meds and metformin today.      Malina Avendaño, RN  10/04/21 3808     General: Awake, alert and oriented. intoxicated appearing. Well developed, hydrated and nourished. Appears stated age.   Skin: Skin in warm, dry and intact without rashes or lesions. Appropriate color for ethnicity  HENMT: head normocephalic and atraumatic; bilateral external ears without swelling. no nasal discharge. moist oral mucosa. supple neck, trachea midline  EYES: Conjunctiva clear. nonicteric sclera. EOM intact, Eyelids are normal in appearance without swelling or lesions.  Cardiac: well perfused  Respiratory: breathing comfortably on room air. no audible wheezing or stridor  Abdominal: nondistended  MSK: Neck and back are without deformity, visible external skin changes, or signs of trauma. Curvature of the cervical, thoracic, and lumbar spine are within normal limits. no external signs of trauma. no apparent deficits in ROM of any extremity  Neurological: The patient is awake, alert and oriented to person, place, and time with normal speech. CN 2-12 grossly intact. no apparent deficits. Memory is normal and thought process is intact. No gait abnormalities are appreciated.   Psychiatric: Appropriate mood and affect. Good judgement and insight

## 2025-02-06 NOTE — TELEPHONE ENCOUNTER
Called Kayla with this appointment, and mailed new patient packet.   
Caller: DENEEN    Relationship:     Best call back number: [phone]    What is the best time to reach you: ASAP    Who are you requesting to speak with (clinical staff, provider,  specific staff member):     Do you know the name of the person who called:     What was the call regarding: PT NEEDS TO SCHED HOSPITAL FOLLOW UP     Do you require a callback: YES  
show

## (undated) DEVICE — ROTATING HEMOSTATIC VALVE .096": Brand: RHV

## (undated) DEVICE — CANNULA,ADULT,SOFT-TOUCH,7'TUBE,UC: Brand: PENDING

## (undated) DEVICE — SINGLE-USE BIOPSY FORCEPS: Brand: RADIAL JAW 4

## (undated) DEVICE — INTRO ACCSR BLNT TP

## (undated) DEVICE — STPCK 3/WY HP M/RA W/OFF/HNDL 1050PSI STRL

## (undated) DEVICE — SKIN PREP TRAY W/CHG: Brand: MEDLINE INDUSTRIES, INC.

## (undated) DEVICE — ST ACC MICROPUNCTURE .018 TRANSLSS/SS/TP 5F/10CM 21G/7CM

## (undated) DEVICE — SYR LUERLOK 50ML

## (undated) DEVICE — CATH DIAG EXPO M/ PK 5F FL4/FR4 PIG

## (undated) DEVICE — CATH DIAG EXPO .045 FL3  5F 100CM

## (undated) DEVICE — ST EXT IV SMARTSITE 2VLV SP M LL 5ML IV1

## (undated) DEVICE — CONTN GRAD MEAS TRIANG 32OZ BLK

## (undated) DEVICE — PK CATH CARD 10

## (undated) DEVICE — PROVUE RETRIEVER: Brand: TREVO NXT

## (undated) DEVICE — HYBRID CO2 TUBING/CAP SET FOR OLYMPUS® SCOPES & CO2 SOURCE: Brand: ERBE

## (undated) DEVICE — SOL IRR H2O BTL 1000ML STRL

## (undated) DEVICE — BALLOON GUIDE CATHETER: Brand: FLOWGATE2

## (undated) DEVICE — MICROCATHETER: Brand: TREVO TRAK 21

## (undated) DEVICE — STPCK LP 1WY RA 200PSI

## (undated) DEVICE — Device

## (undated) DEVICE — LUBE GEL ENDOGLIDE 1.1OZ

## (undated) DEVICE — THE BITE BLOCK MAXI, LATEX FREE STRAP IS USED TO PROTECT THE ENDOSCOPE INSERTION TUBE FROM BEING BITTEN BY THE PATIENT.

## (undated) DEVICE — KT MANIFOLD CATHLAB CUST

## (undated) DEVICE — SUCTION CANISTER, 1000CC,SAFELINER: Brand: DEROYAL

## (undated) DEVICE — SPNG VERSALON 4X4 4PLY NONSTRL LF BG/200

## (undated) DEVICE — CATH DIAG EXPO .045 FL3.5 5F 100CM

## (undated) DEVICE — ST INF PRI SMRTSTE 20DRP 2VLV 24ML 117

## (undated) DEVICE — LIMB HOLDER, WRIST/ANKLE: Brand: DEROYAL

## (undated) DEVICE — GW INQWIRE FC PTFE STD J/1.5 .035 260

## (undated) DEVICE — TUBING, SUCTION, 1/4" X 10', STRAIGHT: Brand: MEDLINE

## (undated) DEVICE — RADIFOCUS GLIDEWIRE: Brand: GLIDEWIRE

## (undated) DEVICE — ANGIO-SEAL VIP VASCULAR CLOSURE DEVICE: Brand: ANGIO-SEAL

## (undated) DEVICE — INTRO SHEATH ENGAGE W/50 GW .038 8F12

## (undated) DEVICE — DEV COMP RAD PRELUDESYNC 24CM

## (undated) DEVICE — INTRO SHEATH PRELUDE IDEAL SPRNG COIL 021 6F 23X80CM

## (undated) DEVICE — KT ORCA ORCAPOD DISP STRL

## (undated) DEVICE — CATH TEMPO 5F BER 100CM: Brand: TEMPO

## (undated) DEVICE — GUIDEWIRE WITH ICE™ HYDROPHILIC COATING: Brand: TRANSEND™ EX